# Patient Record
Sex: FEMALE | Race: BLACK OR AFRICAN AMERICAN | Employment: OTHER | URBAN - METROPOLITAN AREA
[De-identification: names, ages, dates, MRNs, and addresses within clinical notes are randomized per-mention and may not be internally consistent; named-entity substitution may affect disease eponyms.]

---

## 2017-02-13 ENCOUNTER — HOSPITAL ENCOUNTER (OUTPATIENT)
Dept: SURGERY | Age: 67
Discharge: HOME | End: 2017-02-13
Attending: INTERNAL MEDICINE | Admitting: INTERNAL MEDICINE

## 2017-02-13 VITALS
OXYGEN SATURATION: 98 % | RESPIRATION RATE: 16 BRPM | BODY MASS INDEX: 32.61 KG/M2 | HEIGHT: 64 IN | SYSTOLIC BLOOD PRESSURE: 135 MMHG | DIASTOLIC BLOOD PRESSURE: 78 MMHG | HEART RATE: 77 BPM | WEIGHT: 191 LBS | TEMPERATURE: 97.9 F

## 2017-02-13 RX ORDER — SODIUM CHLORIDE, SODIUM LACTATE, POTASSIUM CHLORIDE, CALCIUM CHLORIDE 600; 310; 30; 20 MG/100ML; MG/100ML; MG/100ML; MG/100ML
INJECTION, SOLUTION INTRAVENOUS CONTINUOUS
Status: DISCONTINUED | OUTPATIENT
Start: 2017-02-13 | End: 2017-02-14 | Stop reason: HOSPADM

## 2017-02-13 RX ORDER — SODIUM CHLORIDE 0.9 % (FLUSH) 0.9 %
10 SYRINGE (ML) INJECTION EVERY 12 HOURS SCHEDULED
Status: DISCONTINUED | OUTPATIENT
Start: 2017-02-13 | End: 2017-02-14 | Stop reason: HOSPADM

## 2017-02-13 RX ORDER — SODIUM CHLORIDE 0.9 % (FLUSH) 0.9 %
10 SYRINGE (ML) INJECTION PRN
Status: DISCONTINUED | OUTPATIENT
Start: 2017-02-13 | End: 2017-02-14 | Stop reason: HOSPADM

## 2017-02-13 ASSESSMENT — PAIN - FUNCTIONAL ASSESSMENT: PAIN_FUNCTIONAL_ASSESSMENT: 0-10

## 2017-03-06 ENCOUNTER — HOSPITAL ENCOUNTER (OUTPATIENT)
Dept: PHYSICAL THERAPY | Age: 67
Discharge: HOME | End: 2017-03-31
Attending: ORTHOPAEDIC SURGERY | Admitting: ORTHOPAEDIC SURGERY

## 2017-03-10 ENCOUNTER — OFFICE VISIT (OUTPATIENT)
Dept: FAMILY MEDICINE CLINIC | Age: 67
End: 2017-03-10

## 2017-03-10 VITALS
DIASTOLIC BLOOD PRESSURE: 80 MMHG | SYSTOLIC BLOOD PRESSURE: 120 MMHG | HEIGHT: 64 IN | WEIGHT: 193.9 LBS | OXYGEN SATURATION: 95 % | HEART RATE: 67 BPM | BODY MASS INDEX: 33.1 KG/M2

## 2017-03-10 DIAGNOSIS — L98.9 SKIN LESION OF LEFT LEG: ICD-10-CM

## 2017-03-10 PROCEDURE — 1123F ACP DISCUSS/DSCN MKR DOCD: CPT | Performed by: FAMILY MEDICINE

## 2017-03-10 PROCEDURE — G8417 CALC BMI ABV UP PARAM F/U: HCPCS | Performed by: FAMILY MEDICINE

## 2017-03-10 PROCEDURE — 3017F COLORECTAL CA SCREEN DOC REV: CPT | Performed by: FAMILY MEDICINE

## 2017-03-10 PROCEDURE — 1090F PRES/ABSN URINE INCON ASSESS: CPT | Performed by: FAMILY MEDICINE

## 2017-03-10 PROCEDURE — G8427 DOCREV CUR MEDS BY ELIG CLIN: HCPCS | Performed by: FAMILY MEDICINE

## 2017-03-10 PROCEDURE — G8484 FLU IMMUNIZE NO ADMIN: HCPCS | Performed by: FAMILY MEDICINE

## 2017-03-10 PROCEDURE — 4040F PNEUMOC VAC/ADMIN/RCVD: CPT | Performed by: FAMILY MEDICINE

## 2017-03-10 PROCEDURE — 99213 OFFICE O/P EST LOW 20 MIN: CPT | Performed by: FAMILY MEDICINE

## 2017-03-10 PROCEDURE — 3014F SCREEN MAMMO DOC REV: CPT | Performed by: FAMILY MEDICINE

## 2017-03-10 PROCEDURE — 4004F PT TOBACCO SCREEN RCVD TLK: CPT | Performed by: FAMILY MEDICINE

## 2017-03-10 PROCEDURE — G8399 PT W/DXA RESULTS DOCUMENT: HCPCS | Performed by: FAMILY MEDICINE

## 2017-03-10 ASSESSMENT — ENCOUNTER SYMPTOMS
NAIL CHANGES: 0
COUGH: 0
VOMITING: 0
EYE PAIN: 0
SORE THROAT: 0
DIARRHEA: 0
SHORTNESS OF BREATH: 0
RHINORRHEA: 0

## 2017-05-19 ENCOUNTER — OFFICE VISIT (OUTPATIENT)
Dept: FAMILY MEDICINE CLINIC | Age: 67
End: 2017-05-19

## 2017-05-19 VITALS
HEIGHT: 64 IN | SYSTOLIC BLOOD PRESSURE: 120 MMHG | BODY MASS INDEX: 33.53 KG/M2 | OXYGEN SATURATION: 98 % | WEIGHT: 196.4 LBS | HEART RATE: 85 BPM | DIASTOLIC BLOOD PRESSURE: 80 MMHG

## 2017-05-19 DIAGNOSIS — M75.21 BICEPS TENDINITIS OF RIGHT UPPER EXTREMITY: ICD-10-CM

## 2017-05-19 DIAGNOSIS — F17.200 SMOKER: ICD-10-CM

## 2017-05-19 PROCEDURE — 4004F PT TOBACCO SCREEN RCVD TLK: CPT | Performed by: FAMILY MEDICINE

## 2017-05-19 PROCEDURE — 1090F PRES/ABSN URINE INCON ASSESS: CPT | Performed by: FAMILY MEDICINE

## 2017-05-19 PROCEDURE — G8399 PT W/DXA RESULTS DOCUMENT: HCPCS | Performed by: FAMILY MEDICINE

## 2017-05-19 PROCEDURE — 3017F COLORECTAL CA SCREEN DOC REV: CPT | Performed by: FAMILY MEDICINE

## 2017-05-19 PROCEDURE — G8417 CALC BMI ABV UP PARAM F/U: HCPCS | Performed by: FAMILY MEDICINE

## 2017-05-19 PROCEDURE — 99213 OFFICE O/P EST LOW 20 MIN: CPT | Performed by: FAMILY MEDICINE

## 2017-05-19 PROCEDURE — 4040F PNEUMOC VAC/ADMIN/RCVD: CPT | Performed by: FAMILY MEDICINE

## 2017-05-19 PROCEDURE — 3014F SCREEN MAMMO DOC REV: CPT | Performed by: FAMILY MEDICINE

## 2017-05-19 PROCEDURE — G8427 DOCREV CUR MEDS BY ELIG CLIN: HCPCS | Performed by: FAMILY MEDICINE

## 2017-05-19 PROCEDURE — 1123F ACP DISCUSS/DSCN MKR DOCD: CPT | Performed by: FAMILY MEDICINE

## 2017-05-19 RX ORDER — ACETAMINOPHEN 325 MG/1
650 TABLET ORAL EVERY 6 HOURS PRN
COMMUNITY
End: 2021-04-15

## 2017-07-13 ENCOUNTER — CARE COORDINATION (OUTPATIENT)
Dept: CARE COORDINATION | Age: 67
End: 2017-07-13

## 2017-07-13 ENCOUNTER — OFFICE VISIT (OUTPATIENT)
Dept: FAMILY MEDICINE CLINIC | Age: 67
End: 2017-07-13

## 2017-07-13 VITALS
TEMPERATURE: 97.6 F | OXYGEN SATURATION: 99 % | DIASTOLIC BLOOD PRESSURE: 80 MMHG | BODY MASS INDEX: 33.12 KG/M2 | HEIGHT: 64 IN | WEIGHT: 194 LBS | SYSTOLIC BLOOD PRESSURE: 136 MMHG | HEART RATE: 79 BPM

## 2017-07-13 DIAGNOSIS — M54.32 SCIATICA, LEFT SIDE: ICD-10-CM

## 2017-07-13 PROCEDURE — 4004F PT TOBACCO SCREEN RCVD TLK: CPT | Performed by: NURSE PRACTITIONER

## 2017-07-13 PROCEDURE — G8427 DOCREV CUR MEDS BY ELIG CLIN: HCPCS | Performed by: NURSE PRACTITIONER

## 2017-07-13 PROCEDURE — 3014F SCREEN MAMMO DOC REV: CPT | Performed by: NURSE PRACTITIONER

## 2017-07-13 PROCEDURE — 99213 OFFICE O/P EST LOW 20 MIN: CPT | Performed by: NURSE PRACTITIONER

## 2017-07-13 PROCEDURE — 3017F COLORECTAL CA SCREEN DOC REV: CPT | Performed by: NURSE PRACTITIONER

## 2017-07-13 PROCEDURE — G8417 CALC BMI ABV UP PARAM F/U: HCPCS | Performed by: NURSE PRACTITIONER

## 2017-07-13 PROCEDURE — G8399 PT W/DXA RESULTS DOCUMENT: HCPCS | Performed by: NURSE PRACTITIONER

## 2017-07-13 PROCEDURE — 4040F PNEUMOC VAC/ADMIN/RCVD: CPT | Performed by: NURSE PRACTITIONER

## 2017-07-13 PROCEDURE — 1123F ACP DISCUSS/DSCN MKR DOCD: CPT | Performed by: NURSE PRACTITIONER

## 2017-07-13 PROCEDURE — 1090F PRES/ABSN URINE INCON ASSESS: CPT | Performed by: NURSE PRACTITIONER

## 2017-07-13 RX ORDER — BACLOFEN 10 MG/1
10 TABLET ORAL 2 TIMES DAILY
Qty: 60 TABLET | Refills: 3 | Status: SHIPPED | OUTPATIENT
Start: 2017-07-13 | End: 2017-12-18

## 2017-07-13 RX ORDER — OXYCODONE HYDROCHLORIDE AND ACETAMINOPHEN 5; 325 MG/1; MG/1
1-2 TABLET ORAL EVERY 4 HOURS PRN
Qty: 30 TABLET | Refills: 0 | Status: SHIPPED | OUTPATIENT
Start: 2017-07-13 | End: 2017-07-20

## 2017-07-13 ASSESSMENT — ENCOUNTER SYMPTOMS
WHEEZING: 0
ALLERGIC/IMMUNOLOGIC NEGATIVE: 1
STRIDOR: 0
VOICE CHANGE: 0
GASTROINTESTINAL NEGATIVE: 1
CHOKING: 0
SHORTNESS OF BREATH: 0
EYES NEGATIVE: 1
COLOR CHANGE: 0
NAUSEA: 0
ANAL BLEEDING: 0
BACK PAIN: 1
ABDOMINAL DISTENTION: 0
TROUBLE SWALLOWING: 0
DIARRHEA: 0
APNEA: 0
BOWEL INCONTINENCE: 0
CHEST TIGHTNESS: 0
CONSTIPATION: 0
ABDOMINAL PAIN: 0
BLOOD IN STOOL: 0
VOMITING: 0
COUGH: 0
FACIAL SWELLING: 0

## 2017-07-18 ENCOUNTER — OFFICE VISIT (OUTPATIENT)
Dept: FAMILY MEDICINE CLINIC | Age: 67
End: 2017-07-18

## 2017-07-18 VITALS
WEIGHT: 196.2 LBS | BODY MASS INDEX: 33.49 KG/M2 | OXYGEN SATURATION: 97 % | TEMPERATURE: 97.6 F | HEART RATE: 80 BPM | SYSTOLIC BLOOD PRESSURE: 136 MMHG | HEIGHT: 64 IN | DIASTOLIC BLOOD PRESSURE: 72 MMHG

## 2017-07-18 DIAGNOSIS — M54.32 SCIATICA, LEFT SIDE: ICD-10-CM

## 2017-07-18 PROCEDURE — 1090F PRES/ABSN URINE INCON ASSESS: CPT | Performed by: NURSE PRACTITIONER

## 2017-07-18 PROCEDURE — G8427 DOCREV CUR MEDS BY ELIG CLIN: HCPCS | Performed by: NURSE PRACTITIONER

## 2017-07-18 PROCEDURE — 4040F PNEUMOC VAC/ADMIN/RCVD: CPT | Performed by: NURSE PRACTITIONER

## 2017-07-18 PROCEDURE — 3017F COLORECTAL CA SCREEN DOC REV: CPT | Performed by: NURSE PRACTITIONER

## 2017-07-18 PROCEDURE — 99213 OFFICE O/P EST LOW 20 MIN: CPT | Performed by: NURSE PRACTITIONER

## 2017-07-18 PROCEDURE — G8399 PT W/DXA RESULTS DOCUMENT: HCPCS | Performed by: NURSE PRACTITIONER

## 2017-07-18 PROCEDURE — G8417 CALC BMI ABV UP PARAM F/U: HCPCS | Performed by: NURSE PRACTITIONER

## 2017-07-18 PROCEDURE — 3014F SCREEN MAMMO DOC REV: CPT | Performed by: NURSE PRACTITIONER

## 2017-07-18 PROCEDURE — 1123F ACP DISCUSS/DSCN MKR DOCD: CPT | Performed by: NURSE PRACTITIONER

## 2017-07-18 PROCEDURE — 4004F PT TOBACCO SCREEN RCVD TLK: CPT | Performed by: NURSE PRACTITIONER

## 2017-07-18 RX ORDER — TIZANIDINE 4 MG/1
2 TABLET ORAL 3 TIMES DAILY
Qty: 60 TABLET | Refills: 2 | Status: SHIPPED | OUTPATIENT
Start: 2017-07-18 | End: 2017-12-18 | Stop reason: SDUPTHER

## 2017-07-18 ASSESSMENT — ENCOUNTER SYMPTOMS
WHEEZING: 0
ANAL BLEEDING: 0
BOWEL INCONTINENCE: 0
ABDOMINAL DISTENTION: 0
CONSTIPATION: 0
SHORTNESS OF BREATH: 0
CHOKING: 0
COUGH: 0
STRIDOR: 0
FACIAL SWELLING: 0
APNEA: 0
ABDOMINAL PAIN: 0
GASTROINTESTINAL NEGATIVE: 1
NAUSEA: 0
CHEST TIGHTNESS: 0
BACK PAIN: 1
COLOR CHANGE: 0
ALLERGIC/IMMUNOLOGIC NEGATIVE: 1
VOMITING: 0
EYES NEGATIVE: 1
BLOOD IN STOOL: 0
DIARRHEA: 0
TROUBLE SWALLOWING: 0
VOICE CHANGE: 0

## 2017-07-19 ENCOUNTER — HOSPITAL ENCOUNTER (OUTPATIENT)
Dept: CT IMAGING | Age: 67
Discharge: HOME | End: 2017-07-19
Attending: NURSE PRACTITIONER | Admitting: NURSE PRACTITIONER

## 2017-07-19 DIAGNOSIS — M54.32 SCIATICA OF LEFT SIDE: ICD-10-CM

## 2017-07-19 DIAGNOSIS — M54.32 SCIATICA, LEFT SIDE: ICD-10-CM

## 2017-07-20 ENCOUNTER — TELEPHONE (OUTPATIENT)
Dept: FAMILY MEDICINE CLINIC | Age: 67
End: 2017-07-20

## 2017-07-21 DIAGNOSIS — M54.16 LUMBAR RADICULOPATHY: Primary | ICD-10-CM

## 2017-07-31 ENCOUNTER — HOSPITAL ENCOUNTER (OUTPATIENT)
Dept: CARDIAC CATH/INVASIVE PROCEDURES | Age: 67
Discharge: HOME | End: 2017-07-31
Attending: FAMILY MEDICINE | Admitting: FAMILY MEDICINE

## 2017-07-31 DIAGNOSIS — M54.16 LUMBAR RADICULOPATHY: ICD-10-CM

## 2017-11-30 ENCOUNTER — OFFICE VISIT (OUTPATIENT)
Dept: FAMILY MEDICINE CLINIC | Age: 67
End: 2017-11-30

## 2017-11-30 VITALS
SYSTOLIC BLOOD PRESSURE: 130 MMHG | HEART RATE: 88 BPM | HEIGHT: 64 IN | BODY MASS INDEX: 33.84 KG/M2 | WEIGHT: 198.2 LBS | DIASTOLIC BLOOD PRESSURE: 80 MMHG | OXYGEN SATURATION: 98 %

## 2017-11-30 DIAGNOSIS — M79.89 LEG SWELLING: ICD-10-CM

## 2017-11-30 PROCEDURE — G8399 PT W/DXA RESULTS DOCUMENT: HCPCS | Performed by: FAMILY MEDICINE

## 2017-11-30 PROCEDURE — G8417 CALC BMI ABV UP PARAM F/U: HCPCS | Performed by: FAMILY MEDICINE

## 2017-11-30 PROCEDURE — G8484 FLU IMMUNIZE NO ADMIN: HCPCS | Performed by: FAMILY MEDICINE

## 2017-11-30 PROCEDURE — 4040F PNEUMOC VAC/ADMIN/RCVD: CPT | Performed by: FAMILY MEDICINE

## 2017-11-30 PROCEDURE — 3014F SCREEN MAMMO DOC REV: CPT | Performed by: FAMILY MEDICINE

## 2017-11-30 PROCEDURE — 1090F PRES/ABSN URINE INCON ASSESS: CPT | Performed by: FAMILY MEDICINE

## 2017-11-30 PROCEDURE — 99213 OFFICE O/P EST LOW 20 MIN: CPT | Performed by: FAMILY MEDICINE

## 2017-11-30 PROCEDURE — 1123F ACP DISCUSS/DSCN MKR DOCD: CPT | Performed by: FAMILY MEDICINE

## 2017-11-30 PROCEDURE — 4004F PT TOBACCO SCREEN RCVD TLK: CPT | Performed by: FAMILY MEDICINE

## 2017-11-30 PROCEDURE — 3017F COLORECTAL CA SCREEN DOC REV: CPT | Performed by: FAMILY MEDICINE

## 2017-11-30 PROCEDURE — G8427 DOCREV CUR MEDS BY ELIG CLIN: HCPCS | Performed by: FAMILY MEDICINE

## 2017-11-30 ASSESSMENT — PATIENT HEALTH QUESTIONNAIRE - PHQ9
1. LITTLE INTEREST OR PLEASURE IN DOING THINGS: 0
2. FEELING DOWN, DEPRESSED OR HOPELESS: 0
SUM OF ALL RESPONSES TO PHQ9 QUESTIONS 1 & 2: 0
SUM OF ALL RESPONSES TO PHQ QUESTIONS 1-9: 0

## 2017-11-30 NOTE — PROGRESS NOTES
thought content normal.   Vitals reviewed. Assessment:      Leg swelling  ?  Superficial phlebitis--get dopplers          Plan:      above

## 2017-12-01 ENCOUNTER — HOSPITAL ENCOUNTER (OUTPATIENT)
Dept: VASCULAR LAB | Age: 67
Discharge: HOME | End: 2017-12-01
Attending: FAMILY MEDICINE | Admitting: FAMILY MEDICINE

## 2017-12-01 DIAGNOSIS — M79.89 LEG SWELLING: Primary | ICD-10-CM

## 2017-12-01 DIAGNOSIS — M79.89 OTHER SPECIFIED SOFT TISSUE DISORDERS (CODE): ICD-10-CM

## 2017-12-18 ENCOUNTER — OFFICE VISIT (OUTPATIENT)
Dept: FAMILY MEDICINE CLINIC | Age: 67
End: 2017-12-18

## 2017-12-18 VITALS
WEIGHT: 196.7 LBS | HEIGHT: 64 IN | OXYGEN SATURATION: 96 % | HEART RATE: 89 BPM | DIASTOLIC BLOOD PRESSURE: 80 MMHG | BODY MASS INDEX: 33.58 KG/M2 | SYSTOLIC BLOOD PRESSURE: 120 MMHG

## 2017-12-18 DIAGNOSIS — M54.16 LUMBAR RADICULOPATHY: ICD-10-CM

## 2017-12-18 PROCEDURE — 3017F COLORECTAL CA SCREEN DOC REV: CPT | Performed by: FAMILY MEDICINE

## 2017-12-18 PROCEDURE — G8484 FLU IMMUNIZE NO ADMIN: HCPCS | Performed by: FAMILY MEDICINE

## 2017-12-18 PROCEDURE — 1123F ACP DISCUSS/DSCN MKR DOCD: CPT | Performed by: FAMILY MEDICINE

## 2017-12-18 PROCEDURE — 3014F SCREEN MAMMO DOC REV: CPT | Performed by: FAMILY MEDICINE

## 2017-12-18 PROCEDURE — 4040F PNEUMOC VAC/ADMIN/RCVD: CPT | Performed by: FAMILY MEDICINE

## 2017-12-18 PROCEDURE — 99213 OFFICE O/P EST LOW 20 MIN: CPT | Performed by: FAMILY MEDICINE

## 2017-12-18 PROCEDURE — 4004F PT TOBACCO SCREEN RCVD TLK: CPT | Performed by: FAMILY MEDICINE

## 2017-12-18 PROCEDURE — G8417 CALC BMI ABV UP PARAM F/U: HCPCS | Performed by: FAMILY MEDICINE

## 2017-12-18 PROCEDURE — G8399 PT W/DXA RESULTS DOCUMENT: HCPCS | Performed by: FAMILY MEDICINE

## 2017-12-18 PROCEDURE — G8427 DOCREV CUR MEDS BY ELIG CLIN: HCPCS | Performed by: FAMILY MEDICINE

## 2017-12-18 PROCEDURE — 1090F PRES/ABSN URINE INCON ASSESS: CPT | Performed by: FAMILY MEDICINE

## 2017-12-18 RX ORDER — MELOXICAM 15 MG/1
15 TABLET ORAL DAILY
Qty: 30 TABLET | Refills: 3 | Status: SHIPPED | OUTPATIENT
Start: 2017-12-18 | End: 2018-10-25

## 2017-12-18 RX ORDER — TIZANIDINE 4 MG/1
4 TABLET ORAL 3 TIMES DAILY
Qty: 60 TABLET | Refills: 2 | Status: SHIPPED | OUTPATIENT
Start: 2017-12-18 | End: 2019-06-11

## 2017-12-18 RX ORDER — MELOXICAM 15 MG/1
15 TABLET ORAL DAILY
Qty: 30 TABLET | Refills: 3 | Status: SHIPPED | OUTPATIENT
Start: 2017-12-18 | End: 2017-12-18 | Stop reason: SDUPTHER

## 2017-12-18 ASSESSMENT — ENCOUNTER SYMPTOMS
ABDOMINAL PAIN: 0
BACK PAIN: 1
BOWEL INCONTINENCE: 0

## 2017-12-18 NOTE — PROGRESS NOTES
Subjective:      Patient ID: Josseline De Anda is a 79 y.o. female. Back Pain   This is a chronic problem. The current episode started more than 1 year ago. The problem occurs constantly. The problem has been waxing and waning since onset. The pain is present in the lumbar spine. The quality of the pain is described as aching. The pain is moderate. The pain is worse during the day. The symptoms are aggravated by bending and position. Stiffness is present all day. Associated symptoms include weakness. Pertinent negatives include no abdominal pain, bladder incontinence, bowel incontinence, chest pain, dysuria, fever, headaches, leg pain, numbness, paresis, paresthesias, pelvic pain, perianal numbness, tingling or weight loss. Risk factors include sedentary lifestyle. She has tried muscle relaxant for the symptoms. The treatment provided mild relief. Knee Pain    The incident occurred more than 1 week ago. The incident occurred at home. There was no injury mechanism. The pain is present in the left leg. The pain is moderate. The pain has been constant since onset. Pertinent negatives include no numbness or tingling. Nothing aggravates the symptoms. She has tried non-weight bearing and ice for the symptoms. The treatment provided moderate relief. Review of Systems   Constitutional: Negative for fever and weight loss. Cardiovascular: Negative for chest pain. Gastrointestinal: Negative for abdominal pain and bowel incontinence. Genitourinary: Negative for bladder incontinence, dysuria and pelvic pain. Musculoskeletal: Positive for back pain. Neurological: Positive for weakness. Negative for tingling, numbness, headaches and paresthesias. Objective:   Physical Exam   Constitutional: She is oriented to person, place, and time. She appears well-developed and well-nourished. No distress.    HENT:   Mouth/Throat: Oropharynx is clear and moist.   Eyes: Conjunctivae are normal. Pupils are equal, round, and reactive to light. Neck: No JVD present. No tracheal deviation present. No thyromegaly present. Cardiovascular: Normal rate, regular rhythm, normal heart sounds and intact distal pulses. Exam reveals no gallop. No murmur heard. Pulmonary/Chest: Effort normal and breath sounds normal. No stridor. No respiratory distress. She has no wheezes. She has no rales. She exhibits no tenderness. Abdominal: Soft. Bowel sounds are normal. She exhibits no distension and no mass. There is no tenderness. Musculoskeletal: She exhibits no edema or tenderness. Lymphadenopathy:     She has no cervical adenopathy. Neurological: She is alert and oriented to person, place, and time. She displays normal reflexes. No cranial nerve deficit. She exhibits normal muscle tone. Coordination normal.   Skin: Skin is warm and dry. No rash noted. No erythema. No pallor. Psychiatric: She has a normal mood and affect. Her behavior is normal. Judgment and thought content normal.   Vitals reviewed.       Assessment:      Lumbar radiculopathy  Trial of mobic--JOE didn't help          Plan:      above

## 2018-02-20 ENCOUNTER — HOSPITAL ENCOUNTER (OUTPATIENT)
Dept: MAMMOGRAPHY | Age: 68
Discharge: HOME | End: 2018-02-20
Attending: FAMILY MEDICINE | Admitting: FAMILY MEDICINE

## 2018-02-20 DIAGNOSIS — Z12.31 VISIT FOR SCREENING MAMMOGRAM: ICD-10-CM

## 2018-02-26 ENCOUNTER — TELEPHONE (OUTPATIENT)
Dept: FAMILY MEDICINE CLINIC | Age: 68
End: 2018-02-26

## 2018-02-26 DIAGNOSIS — M22.42 CHONDROMALACIA PATELLAE OF LEFT KNEE: ICD-10-CM

## 2018-02-26 DIAGNOSIS — M54.16 LUMBAR RADICULOPATHY: Primary | ICD-10-CM

## 2018-02-26 DIAGNOSIS — M17.12 OSTEOARTHRITIS OF LEFT KNEE, UNSPECIFIED OSTEOARTHRITIS TYPE: ICD-10-CM

## 2018-02-26 DIAGNOSIS — M48.061 SPINAL STENOSIS OF LUMBAR REGION, UNSPECIFIED WHETHER NEUROGENIC CLAUDICATION PRESENT: ICD-10-CM

## 2018-02-26 DIAGNOSIS — M17.0 OSTEOARTHRITIS OF BOTH KNEES, UNSPECIFIED OSTEOARTHRITIS TYPE: ICD-10-CM

## 2018-02-26 NOTE — TELEPHONE ENCOUNTER
Patient is calling requesting a written RX to be faxed in for a lift chair due to her bad knees and bad lower back. If patient gets a RX for this she will save 300+ dollars.  Please advise and  Fax to Je[facility] Ward [phone]

## 2018-03-07 ENCOUNTER — OFFICE VISIT (OUTPATIENT)
Dept: FAMILY MEDICINE CLINIC | Age: 68
End: 2018-03-07

## 2018-03-07 VITALS
TEMPERATURE: 98.9 F | HEART RATE: 98 BPM | OXYGEN SATURATION: 96 % | HEIGHT: 64 IN | BODY MASS INDEX: 34.35 KG/M2 | WEIGHT: 201.2 LBS | DIASTOLIC BLOOD PRESSURE: 80 MMHG | SYSTOLIC BLOOD PRESSURE: 120 MMHG

## 2018-03-07 DIAGNOSIS — K21.9 GASTROESOPHAGEAL REFLUX DISEASE WITHOUT ESOPHAGITIS: ICD-10-CM

## 2018-03-07 DIAGNOSIS — Z00.00 ANNUAL PHYSICAL EXAM: ICD-10-CM

## 2018-03-07 DIAGNOSIS — R50.9 FEVER, UNSPECIFIED FEVER CAUSE: Primary | ICD-10-CM

## 2018-03-07 DIAGNOSIS — F17.200 SMOKER: ICD-10-CM

## 2018-03-07 DIAGNOSIS — J10.1 INFLUENZA A: ICD-10-CM

## 2018-03-07 PROBLEM — M75.21 BICEPS TENDINITIS OF RIGHT UPPER EXTREMITY: Status: RESOLVED | Noted: 2017-05-19 | Resolved: 2018-03-07

## 2018-03-07 LAB
INFLUENZA A ANTIBODY: ABNORMAL
INFLUENZA B ANTIBODY: ABNORMAL

## 2018-03-07 PROCEDURE — G0439 PPPS, SUBSEQ VISIT: HCPCS | Performed by: FAMILY MEDICINE

## 2018-03-07 PROCEDURE — 87804 INFLUENZA ASSAY W/OPTIC: CPT | Performed by: FAMILY MEDICINE

## 2018-03-07 RX ORDER — OSELTAMIVIR PHOSPHATE 75 MG/1
75 CAPSULE ORAL 2 TIMES DAILY
Qty: 10 CAPSULE | Refills: 0 | Status: SHIPPED | OUTPATIENT
Start: 2018-03-07 | End: 2018-03-12

## 2018-03-19 ENCOUNTER — OFFICE VISIT (OUTPATIENT)
Dept: FAMILY MEDICINE CLINIC | Age: 68
End: 2018-03-19

## 2018-03-19 VITALS
WEIGHT: 194.4 LBS | BODY MASS INDEX: 33.19 KG/M2 | HEART RATE: 92 BPM | SYSTOLIC BLOOD PRESSURE: 140 MMHG | OXYGEN SATURATION: 96 % | DIASTOLIC BLOOD PRESSURE: 86 MMHG | HEIGHT: 64 IN

## 2018-03-19 DIAGNOSIS — J45.20 MILD INTERMITTENT ASTHMATIC BRONCHITIS WITHOUT COMPLICATION: Primary | ICD-10-CM

## 2018-03-19 DIAGNOSIS — Z00.00 ANNUAL PHYSICAL EXAM: ICD-10-CM

## 2018-03-19 LAB
A/G RATIO: 1.3 (ref 1.1–2.2)
ALBUMIN SERPL-MCNC: 4.4 G/DL (ref 3.4–5)
ALP BLD-CCNC: 57 U/L (ref 40–129)
ALT SERPL-CCNC: 7 U/L (ref 10–40)
ANION GAP SERPL CALCULATED.3IONS-SCNC: 18 MMOL/L (ref 3–16)
AST SERPL-CCNC: 15 U/L (ref 15–37)
BASOPHILS ABSOLUTE: 0 K/UL (ref 0–0.2)
BASOPHILS RELATIVE PERCENT: 0.7 %
BILIRUB SERPL-MCNC: <0.2 MG/DL (ref 0–1)
BUN BLDV-MCNC: 16 MG/DL (ref 7–20)
CALCIUM SERPL-MCNC: 9.3 MG/DL (ref 8.3–10.6)
CHLORIDE BLD-SCNC: 105 MMOL/L (ref 99–110)
CHOLESTEROL, TOTAL: 187 MG/DL (ref 0–199)
CO2: 24 MMOL/L (ref 21–32)
CREAT SERPL-MCNC: 0.9 MG/DL (ref 0.6–1.2)
EOSINOPHILS ABSOLUTE: 0.1 K/UL (ref 0–0.6)
EOSINOPHILS RELATIVE PERCENT: 1.6 %
GFR AFRICAN AMERICAN: >60
GFR NON-AFRICAN AMERICAN: >60
GLOBULIN: 3.3 G/DL
GLUCOSE BLD-MCNC: 105 MG/DL (ref 70–99)
HCT VFR BLD CALC: 44.5 % (ref 36–48)
HDLC SERPL-MCNC: 44 MG/DL (ref 40–60)
HEMOGLOBIN: 14.9 G/DL (ref 12–16)
LDL CHOLESTEROL CALCULATED: 123 MG/DL
LYMPHOCYTES ABSOLUTE: 2.4 K/UL (ref 1–5.1)
LYMPHOCYTES RELATIVE PERCENT: 48.8 %
MCH RBC QN AUTO: 29.6 PG (ref 26–34)
MCHC RBC AUTO-ENTMCNC: 33.6 G/DL (ref 31–36)
MCV RBC AUTO: 88.1 FL (ref 80–100)
MONOCYTES ABSOLUTE: 0.6 K/UL (ref 0–1.3)
MONOCYTES RELATIVE PERCENT: 11.1 %
NEUTROPHILS ABSOLUTE: 1.9 K/UL (ref 1.7–7.7)
NEUTROPHILS RELATIVE PERCENT: 37.8 %
PDW BLD-RTO: 15.2 % (ref 12.4–15.4)
PLATELET # BLD: 282 K/UL (ref 135–450)
PMV BLD AUTO: 9 FL (ref 5–10.5)
POTASSIUM SERPL-SCNC: 4.8 MMOL/L (ref 3.5–5.1)
RBC # BLD: 5.05 M/UL (ref 4–5.2)
SODIUM BLD-SCNC: 147 MMOL/L (ref 136–145)
TOTAL PROTEIN: 7.7 G/DL (ref 6.4–8.2)
TRIGL SERPL-MCNC: 98 MG/DL (ref 0–150)
VLDLC SERPL CALC-MCNC: 20 MG/DL
WBC # BLD: 5 K/UL (ref 4–11)

## 2018-03-19 PROCEDURE — 3017F COLORECTAL CA SCREEN DOC REV: CPT | Performed by: FAMILY MEDICINE

## 2018-03-19 PROCEDURE — 4004F PT TOBACCO SCREEN RCVD TLK: CPT | Performed by: FAMILY MEDICINE

## 2018-03-19 PROCEDURE — G8417 CALC BMI ABV UP PARAM F/U: HCPCS | Performed by: FAMILY MEDICINE

## 2018-03-19 PROCEDURE — 3014F SCREEN MAMMO DOC REV: CPT | Performed by: FAMILY MEDICINE

## 2018-03-19 PROCEDURE — 99213 OFFICE O/P EST LOW 20 MIN: CPT | Performed by: FAMILY MEDICINE

## 2018-03-19 PROCEDURE — 1123F ACP DISCUSS/DSCN MKR DOCD: CPT | Performed by: FAMILY MEDICINE

## 2018-03-19 PROCEDURE — G8484 FLU IMMUNIZE NO ADMIN: HCPCS | Performed by: FAMILY MEDICINE

## 2018-03-19 PROCEDURE — G8399 PT W/DXA RESULTS DOCUMENT: HCPCS | Performed by: FAMILY MEDICINE

## 2018-03-19 PROCEDURE — G8427 DOCREV CUR MEDS BY ELIG CLIN: HCPCS | Performed by: FAMILY MEDICINE

## 2018-03-19 PROCEDURE — 1090F PRES/ABSN URINE INCON ASSESS: CPT | Performed by: FAMILY MEDICINE

## 2018-03-19 PROCEDURE — 4040F PNEUMOC VAC/ADMIN/RCVD: CPT | Performed by: FAMILY MEDICINE

## 2018-03-19 RX ORDER — ALBUTEROL SULFATE 90 UG/1
2 AEROSOL, METERED RESPIRATORY (INHALATION) EVERY 6 HOURS PRN
Qty: 1 INHALER | Refills: 3 | Status: SHIPPED | OUTPATIENT
Start: 2018-03-19 | End: 2019-06-11

## 2018-03-19 RX ORDER — PREDNISONE 20 MG/1
20 TABLET ORAL DAILY
Qty: 10 TABLET | Refills: 0 | Status: SHIPPED | OUTPATIENT
Start: 2018-03-19 | End: 2018-03-29

## 2018-03-19 ASSESSMENT — ENCOUNTER SYMPTOMS
WHEEZING: 0
RHINORRHEA: 1
HEMOPTYSIS: 0
HEARTBURN: 0
COUGH: 1
SORE THROAT: 0
SHORTNESS OF BREATH: 1

## 2018-03-19 NOTE — PROGRESS NOTES
Subjective:      Patient ID: Sai Longoria is a 79 y.o. female. Cough   This is a new problem. The current episode started in the past 7 days. The problem has been unchanged. The problem occurs constantly. The cough is non-productive. Associated symptoms include nasal congestion, postnasal drip, rhinorrhea and shortness of breath. Pertinent negatives include no chest pain, chills, ear congestion, ear pain, fever, headaches, heartburn, hemoptysis, myalgias, rash, sore throat, sweats, weight loss or wheezing. Nothing aggravates the symptoms. She has tried nothing for the symptoms. The treatment provided moderate relief. There is no history of asthma, bronchiectasis, bronchitis, COPD, emphysema, environmental allergies or pneumonia. is allergic to glucosamine chondroitin joint [glucos-chondroit-hyaluron-msm] and relafen [nabumetone]. Current Outpatient Prescriptions:     tiZANidine (ZANAFLEX) 4 MG tablet, Take 1 tablet by mouth 3 times daily, Disp: 60 tablet, Rfl: 2    meloxicam (MOBIC) 15 MG tablet, Take 1 tablet by mouth daily, Disp: 30 tablet, Rfl: 3    Lift Chair MISC, by Does not apply route, Disp: 1 each, Rfl: 0    acetaminophen (TYLENOL) 325 MG tablet, Take 650 mg by mouth every 6 hours as needed for Pain, Disp: , Rfl:     vitamin B-12 (CYANOCOBALAMIN) 1000 MCG tablet, Take 1,000 mcg by mouth daily, Disp: , Rfl:     Calcium-Magnesium-Vitamin D (CALCIUM 500 PO), Take by mouth, Disp: , Rfl:     vitamin D-3 (CHOLECALCIFEROL) 5000 UNITS TABS, Take 5,000 Units by mouth daily. , Disp: , Rfl:      has a past medical history of Arthritis; Bile induced gastritis; Chest pain; Chronic back pain; Colorectal polyps; Diverticulosis; GERD (gastroesophageal reflux disease); Helicobacter pylori gastritis; Postmenopausal bleeding; Pregnancy; and Spinal stenosis.     Past Surgical History:   Procedure Laterality Date    CARPAL TUNNEL RELEASE  2006    CHOLECYSTECTOMY  1996    COLONOSCOPY  [id number]    U0N  MENISCECTOMY  2008    L knee    TUBAL LIGATION  1985        reports that she has been smoking Cigars. She has never used smokeless tobacco. She reports that she drinks about 0.6 oz of alcohol per week . family history includes Cancer in an other family member; Diabetes in her father, mother, sister, and sister; Hypertension in her father, mother, and sister; Lazarus Fujisawa in her sister; Other (age of onset: 79) in her brother; Thyroid Disease in her sister. Immunization History   Administered Date(s) Administered    Influenza Virus Vaccine 09/28/2011, 10/08/2013, 10/16/2014    Influenza, High Dose 09/17/2015    Pneumococcal 13-valent Conjugate ([id number]) 09/17/2015    Pneumococcal Polysaccharide ([id number]) 09/28/2011    Tdap (Boostrix, Adacel) 03/12/2013    Tetanus 08/07/2008    Zoster Live (Zostavax) 10/02/2012     Review of Systems   Constitutional: Negative for chills, fever and weight loss. HENT: Positive for postnasal drip and rhinorrhea. Negative for ear pain and sore throat. Respiratory: Positive for cough and shortness of breath. Negative for hemoptysis and wheezing. Cardiovascular: Negative for chest pain. Gastrointestinal: Negative for heartburn. Musculoskeletal: Negative for myalgias. Skin: Negative for rash. Allergic/Immunologic: Negative for environmental allergies. Neurological: Negative for headaches. Objective:   Physical Exam   Constitutional: She is oriented to person, place, and time. She appears well-developed and well-nourished. No distress. HENT:   Mouth/Throat: Oropharynx is clear and moist.   Eyes: Conjunctivae are normal. Pupils are equal, round, and reactive to light. Neck: No JVD present. No tracheal deviation present. No thyromegaly present. Cardiovascular: Normal rate, regular rhythm, normal heart sounds and intact distal pulses. Exam reveals no gallop. No murmur heard. Pulmonary/Chest: Effort normal. No stridor.  No respiratory

## 2018-04-02 ENCOUNTER — OFFICE VISIT (OUTPATIENT)
Dept: FAMILY MEDICINE CLINIC | Age: 68
End: 2018-04-02

## 2018-04-02 VITALS
SYSTOLIC BLOOD PRESSURE: 130 MMHG | OXYGEN SATURATION: 95 % | HEIGHT: 64 IN | DIASTOLIC BLOOD PRESSURE: 80 MMHG | WEIGHT: 198.9 LBS | HEART RATE: 90 BPM | BODY MASS INDEX: 33.96 KG/M2

## 2018-04-02 DIAGNOSIS — Z23 NEED FOR PNEUMOCOCCAL VACCINE: Primary | ICD-10-CM

## 2018-04-02 DIAGNOSIS — J45.20 MILD INTERMITTENT ASTHMATIC BRONCHITIS WITHOUT COMPLICATION: ICD-10-CM

## 2018-04-02 DIAGNOSIS — Z11.59 NEED FOR HEPATITIS C SCREENING TEST: ICD-10-CM

## 2018-04-02 DIAGNOSIS — R73.9 HYPERGLYCEMIA: ICD-10-CM

## 2018-04-02 LAB — HEPATITIS C ANTIBODY INTERPRETATION: NORMAL

## 2018-04-02 PROCEDURE — 90732 PPSV23 VACC 2 YRS+ SUBQ/IM: CPT | Performed by: FAMILY MEDICINE

## 2018-04-02 PROCEDURE — 1090F PRES/ABSN URINE INCON ASSESS: CPT | Performed by: FAMILY MEDICINE

## 2018-04-02 PROCEDURE — 4004F PT TOBACCO SCREEN RCVD TLK: CPT | Performed by: FAMILY MEDICINE

## 2018-04-02 PROCEDURE — G0009 ADMIN PNEUMOCOCCAL VACCINE: HCPCS | Performed by: FAMILY MEDICINE

## 2018-04-02 PROCEDURE — 3017F COLORECTAL CA SCREEN DOC REV: CPT | Performed by: FAMILY MEDICINE

## 2018-04-02 PROCEDURE — 4040F PNEUMOC VAC/ADMIN/RCVD: CPT | Performed by: FAMILY MEDICINE

## 2018-04-02 PROCEDURE — G8427 DOCREV CUR MEDS BY ELIG CLIN: HCPCS | Performed by: FAMILY MEDICINE

## 2018-04-02 PROCEDURE — G8417 CALC BMI ABV UP PARAM F/U: HCPCS | Performed by: FAMILY MEDICINE

## 2018-04-02 PROCEDURE — 1123F ACP DISCUSS/DSCN MKR DOCD: CPT | Performed by: FAMILY MEDICINE

## 2018-04-02 PROCEDURE — 3014F SCREEN MAMMO DOC REV: CPT | Performed by: FAMILY MEDICINE

## 2018-04-02 PROCEDURE — 99213 OFFICE O/P EST LOW 20 MIN: CPT | Performed by: FAMILY MEDICINE

## 2018-04-02 PROCEDURE — G8399 PT W/DXA RESULTS DOCUMENT: HCPCS | Performed by: FAMILY MEDICINE

## 2018-04-03 LAB
ESTIMATED AVERAGE GLUCOSE: 145.6 MG/DL
HBA1C MFR BLD: 6.7 %

## 2018-04-03 ASSESSMENT — ENCOUNTER SYMPTOMS
NAUSEA: 0
SORE THROAT: 0
VOMITING: 0
COUGH: 0
ABDOMINAL PAIN: 0
SINUS PAIN: 0
DIARRHEA: 0
RHINORRHEA: 0
SWOLLEN GLANDS: 0
WHEEZING: 1

## 2018-04-11 PROBLEM — J10.1 INFLUENZA A: Status: RESOLVED | Noted: 2018-03-07 | Resolved: 2018-04-11

## 2018-04-17 ENCOUNTER — TELEPHONE (OUTPATIENT)
Dept: FAMILY MEDICINE CLINIC | Age: 68
End: 2018-04-17

## 2018-04-25 ENCOUNTER — OFFICE VISIT (OUTPATIENT)
Dept: FAMILY MEDICINE CLINIC | Age: 68
End: 2018-04-25

## 2018-04-25 VITALS
BODY MASS INDEX: 33.73 KG/M2 | WEIGHT: 197.6 LBS | SYSTOLIC BLOOD PRESSURE: 136 MMHG | HEART RATE: 93 BPM | HEIGHT: 64 IN | OXYGEN SATURATION: 98 % | DIASTOLIC BLOOD PRESSURE: 80 MMHG

## 2018-04-25 DIAGNOSIS — E11.69 DIABETES MELLITUS TYPE 2 IN OBESE (HCC): ICD-10-CM

## 2018-04-25 DIAGNOSIS — E66.9 DIABETES MELLITUS TYPE 2 IN OBESE (HCC): ICD-10-CM

## 2018-04-25 PROCEDURE — 1123F ACP DISCUSS/DSCN MKR DOCD: CPT | Performed by: FAMILY MEDICINE

## 2018-04-25 PROCEDURE — 4040F PNEUMOC VAC/ADMIN/RCVD: CPT | Performed by: FAMILY MEDICINE

## 2018-04-25 PROCEDURE — 3017F COLORECTAL CA SCREEN DOC REV: CPT | Performed by: FAMILY MEDICINE

## 2018-04-25 PROCEDURE — 2022F DILAT RTA XM EVC RTNOPTHY: CPT | Performed by: FAMILY MEDICINE

## 2018-04-25 PROCEDURE — G8417 CALC BMI ABV UP PARAM F/U: HCPCS | Performed by: FAMILY MEDICINE

## 2018-04-25 PROCEDURE — G8427 DOCREV CUR MEDS BY ELIG CLIN: HCPCS | Performed by: FAMILY MEDICINE

## 2018-04-25 PROCEDURE — 4004F PT TOBACCO SCREEN RCVD TLK: CPT | Performed by: FAMILY MEDICINE

## 2018-04-25 PROCEDURE — 3044F HG A1C LEVEL LT 7.0%: CPT | Performed by: FAMILY MEDICINE

## 2018-04-25 PROCEDURE — 1090F PRES/ABSN URINE INCON ASSESS: CPT | Performed by: FAMILY MEDICINE

## 2018-04-25 PROCEDURE — 99213 OFFICE O/P EST LOW 20 MIN: CPT | Performed by: FAMILY MEDICINE

## 2018-04-25 PROCEDURE — G8399 PT W/DXA RESULTS DOCUMENT: HCPCS | Performed by: FAMILY MEDICINE

## 2018-04-25 RX ORDER — METFORMIN HYDROCHLORIDE 500 MG/1
500 TABLET, EXTENDED RELEASE ORAL
Qty: 30 TABLET | Refills: 3 | Status: SHIPPED | OUTPATIENT
Start: 2018-04-25 | End: 2018-10-25

## 2018-04-25 ASSESSMENT — ENCOUNTER SYMPTOMS
VOMITING: 0
NAUSEA: 0
ABDOMINAL PAIN: 0
SWOLLEN GLANDS: 0
CHANGE IN BOWEL HABIT: 0
SORE THROAT: 0
COUGH: 0
VISUAL CHANGE: 0

## 2018-05-01 ENCOUNTER — TELEPHONE (OUTPATIENT)
Dept: FAMILY MEDICINE CLINIC | Age: 68
End: 2018-05-01

## 2018-05-01 RX ORDER — BLOOD-GLUCOSE METER
1 KIT MISCELLANEOUS 2 TIMES DAILY
Qty: 1 KIT | Refills: 0 | Status: SHIPPED | OUTPATIENT
Start: 2018-05-01 | End: 2018-05-04 | Stop reason: SDUPTHER

## 2018-05-01 RX ORDER — LANCETS 28 GAUGE
1 EACH MISCELLANEOUS DAILY
Qty: 100 EACH | Refills: 3 | Status: SHIPPED | OUTPATIENT
Start: 2018-05-01 | End: 2018-05-04 | Stop reason: SDUPTHER

## 2018-05-03 ENCOUNTER — TELEPHONE (OUTPATIENT)
Dept: FAMILY MEDICINE CLINIC | Age: 68
End: 2018-05-03

## 2018-05-04 RX ORDER — LANCETS 28 GAUGE
1 EACH MISCELLANEOUS DAILY
Qty: 100 EACH | Refills: 3 | Status: SHIPPED | OUTPATIENT
Start: 2018-05-04 | End: 2018-05-04 | Stop reason: SDUPTHER

## 2018-05-04 RX ORDER — BLOOD-GLUCOSE METER
1 KIT MISCELLANEOUS 2 TIMES DAILY
Qty: 1 KIT | Refills: 0 | Status: SHIPPED | OUTPATIENT
Start: 2018-05-04 | End: 2018-05-10 | Stop reason: SDUPTHER

## 2018-05-04 RX ORDER — LANCETS 28 GAUGE
1 EACH MISCELLANEOUS 2 TIMES DAILY
Qty: 100 EACH | Refills: 3 | Status: SHIPPED | OUTPATIENT
Start: 2018-05-04 | End: 2018-05-10 | Stop reason: SDUPTHER

## 2018-05-10 RX ORDER — BLOOD-GLUCOSE METER
1 KIT MISCELLANEOUS 2 TIMES DAILY
Qty: 1 KIT | Refills: 0 | Status: SHIPPED | OUTPATIENT
Start: 2018-05-10 | End: 2019-01-31 | Stop reason: SDUPTHER

## 2018-05-10 RX ORDER — LANCETS 28 GAUGE
1 EACH MISCELLANEOUS 2 TIMES DAILY
Qty: 100 EACH | Refills: 3 | Status: SHIPPED | OUTPATIENT
Start: 2018-05-10 | End: 2019-01-31 | Stop reason: SDUPTHER

## 2018-05-16 ENCOUNTER — CARE COORDINATION (OUTPATIENT)
Dept: CARE COORDINATION | Age: 68
End: 2018-05-16

## 2018-05-18 ENCOUNTER — CARE COORDINATION (OUTPATIENT)
Dept: CARE COORDINATION | Age: 68
End: 2018-05-18

## 2018-05-18 ASSESSMENT — PATIENT HEALTH QUESTIONNAIRE - PHQ9: SUM OF ALL RESPONSES TO PHQ QUESTIONS 1-9: 0

## 2018-05-22 ENCOUNTER — CARE COORDINATION (OUTPATIENT)
Dept: CARE COORDINATION | Age: 68
End: 2018-05-22

## 2018-06-04 ENCOUNTER — CARE COORDINATION (OUTPATIENT)
Dept: CARE COORDINATION | Age: 68
End: 2018-06-04

## 2018-06-12 ENCOUNTER — CARE COORDINATION (OUTPATIENT)
Dept: CARE COORDINATION | Age: 68
End: 2018-06-12

## 2018-06-13 ENCOUNTER — CARE COORDINATION (OUTPATIENT)
Dept: CARE COORDINATION | Age: 68
End: 2018-06-13

## 2018-07-25 ENCOUNTER — CARE COORDINATION (OUTPATIENT)
Dept: CARE COORDINATION | Age: 68
End: 2018-07-25

## 2018-07-25 ENCOUNTER — OFFICE VISIT (OUTPATIENT)
Dept: FAMILY MEDICINE CLINIC | Age: 68
End: 2018-07-25

## 2018-07-25 VITALS
SYSTOLIC BLOOD PRESSURE: 120 MMHG | HEART RATE: 101 BPM | HEIGHT: 64 IN | OXYGEN SATURATION: 96 % | BODY MASS INDEX: 31.07 KG/M2 | WEIGHT: 182 LBS | DIASTOLIC BLOOD PRESSURE: 80 MMHG

## 2018-07-25 DIAGNOSIS — E66.9 DIABETES MELLITUS TYPE 2 IN OBESE (HCC): ICD-10-CM

## 2018-07-25 DIAGNOSIS — E11.69 DIABETES MELLITUS TYPE 2 IN OBESE (HCC): ICD-10-CM

## 2018-07-25 DIAGNOSIS — R03.0 ELEVATED BLOOD PRESSURE READING WITHOUT DIAGNOSIS OF HYPERTENSION: ICD-10-CM

## 2018-07-25 PROBLEM — M79.89 LEG SWELLING: Status: RESOLVED | Noted: 2017-11-30 | Resolved: 2018-07-25

## 2018-07-25 LAB
ANION GAP SERPL CALCULATED.3IONS-SCNC: 16 MMOL/L (ref 3–16)
BUN BLDV-MCNC: 12 MG/DL (ref 7–20)
CALCIUM SERPL-MCNC: 9.7 MG/DL (ref 8.3–10.6)
CHLORIDE BLD-SCNC: 105 MMOL/L (ref 99–110)
CO2: 23 MMOL/L (ref 21–32)
CREAT SERPL-MCNC: 0.8 MG/DL (ref 0.6–1.2)
GFR AFRICAN AMERICAN: >60
GFR NON-AFRICAN AMERICAN: >60
GLUCOSE BLD-MCNC: 90 MG/DL (ref 70–99)
POTASSIUM SERPL-SCNC: 4.4 MMOL/L (ref 3.5–5.1)
SODIUM BLD-SCNC: 144 MMOL/L (ref 136–145)

## 2018-07-25 PROCEDURE — 4004F PT TOBACCO SCREEN RCVD TLK: CPT | Performed by: FAMILY MEDICINE

## 2018-07-25 PROCEDURE — 1090F PRES/ABSN URINE INCON ASSESS: CPT | Performed by: FAMILY MEDICINE

## 2018-07-25 PROCEDURE — 99213 OFFICE O/P EST LOW 20 MIN: CPT | Performed by: FAMILY MEDICINE

## 2018-07-25 PROCEDURE — 3017F COLORECTAL CA SCREEN DOC REV: CPT | Performed by: FAMILY MEDICINE

## 2018-07-25 PROCEDURE — G8399 PT W/DXA RESULTS DOCUMENT: HCPCS | Performed by: FAMILY MEDICINE

## 2018-07-25 PROCEDURE — G8417 CALC BMI ABV UP PARAM F/U: HCPCS | Performed by: FAMILY MEDICINE

## 2018-07-25 PROCEDURE — 2022F DILAT RTA XM EVC RTNOPTHY: CPT | Performed by: FAMILY MEDICINE

## 2018-07-25 PROCEDURE — 4040F PNEUMOC VAC/ADMIN/RCVD: CPT | Performed by: FAMILY MEDICINE

## 2018-07-25 PROCEDURE — 1101F PT FALLS ASSESS-DOCD LE1/YR: CPT | Performed by: FAMILY MEDICINE

## 2018-07-25 PROCEDURE — G8427 DOCREV CUR MEDS BY ELIG CLIN: HCPCS | Performed by: FAMILY MEDICINE

## 2018-07-25 PROCEDURE — 3044F HG A1C LEVEL LT 7.0%: CPT | Performed by: FAMILY MEDICINE

## 2018-07-25 PROCEDURE — 1123F ACP DISCUSS/DSCN MKR DOCD: CPT | Performed by: FAMILY MEDICINE

## 2018-07-25 ASSESSMENT — ENCOUNTER SYMPTOMS
WHEEZING: 0
ANAL BLEEDING: 0
EYE REDNESS: 0
SINUS PRESSURE: 0
COLOR CHANGE: 0
VOICE CHANGE: 0
EYE DISCHARGE: 0
APNEA: 0
EYE PAIN: 0
BACK PAIN: 0
BLOOD IN STOOL: 0
TROUBLE SWALLOWING: 0
STRIDOR: 0
ABDOMINAL DISTENTION: 0
ABDOMINAL PAIN: 0
PHOTOPHOBIA: 0
NAUSEA: 0
VOMITING: 0
EYE ITCHING: 0
SHORTNESS OF BREATH: 0
RHINORRHEA: 0
DIARRHEA: 0
COUGH: 0
CHEST TIGHTNESS: 0
FACIAL SWELLING: 0
CHOKING: 0
RECTAL PAIN: 0
SORE THROAT: 0
CONSTIPATION: 0

## 2018-07-25 NOTE — PROGRESS NOTES
Subjective:      Patient ID: Ethan Smith is a 76 y.o. female. Diabetes   She presents for her follow-up diabetic visit. She has type 2 diabetes mellitus. MedicAlert identification noted. There are no hypoglycemic associated symptoms. Pertinent negatives for hypoglycemia include no confusion, dizziness, headaches, nervousness/anxiousness, pallor, seizures, speech difficulty or tremors. There are no diabetic associated symptoms. Pertinent negatives for diabetes include no chest pain, no fatigue and no weakness. There are no hypoglycemic complications. There are no diabetic complications. Risk factors for coronary artery disease include family history and obesity. Current diabetic treatment includes diet. She is compliant with treatment all of the time. Her weight is decreasing steadily. Meal planning includes avoidance of concentrated sweets. She participates in exercise daily. Her overall blood glucose range is 110-130 mg/dl. An ACE inhibitor/angiotensin II receptor blocker is not being taken. She does not see a podiatrist.Eye exam is current. Review of Systems   Constitutional: Negative for activity change, appetite change, chills, diaphoresis, fatigue, fever and unexpected weight change. HENT: Negative for congestion, dental problem, drooling, ear discharge, ear pain, facial swelling, hearing loss, mouth sores, nosebleeds, postnasal drip, rhinorrhea, sinus pressure, sneezing, sore throat, tinnitus, trouble swallowing and voice change. Eyes: Negative for photophobia, pain, discharge, redness, itching and visual disturbance. Respiratory: Negative for apnea, cough, choking, chest tightness, shortness of breath, wheezing and stridor. Cardiovascular: Negative for chest pain, palpitations and leg swelling. Gastrointestinal: Negative for abdominal distention, abdominal pain, anal bleeding, blood in stool, constipation, diarrhea, nausea, rectal pain and vomiting.    Genitourinary: Negative for difficulty urinating, dysuria, enuresis, flank pain, frequency, genital sores and hematuria. Musculoskeletal: Negative for arthralgias, back pain, gait problem, joint swelling, myalgias, neck pain and neck stiffness. Skin: Negative for color change, pallor, rash and wound. Neurological: Negative for dizziness, tremors, seizures, syncope, facial asymmetry, speech difficulty, weakness, light-headedness, numbness and headaches. Hematological: Negative for adenopathy. Does not bruise/bleed easily. Psychiatric/Behavioral: Negative for agitation, behavioral problems, confusion, decreased concentration, dysphoric mood, hallucinations, self-injury, sleep disturbance and suicidal ideas. The patient is not nervous/anxious and is not hyperactive. Objective:   Physical Exam   Constitutional: She is oriented to person, place, and time. She appears well-developed and well-nourished. No distress. HENT:   Mouth/Throat: Oropharynx is clear and moist.   Eyes: Conjunctivae are normal. Pupils are equal, round, and reactive to light. Neck: No JVD present. No tracheal deviation present. No thyromegaly present. Cardiovascular: Normal rate, regular rhythm, normal heart sounds and intact distal pulses. Exam reveals no gallop. No murmur heard. Pulmonary/Chest: Effort normal and breath sounds normal. No stridor. No respiratory distress. She has no wheezes. She has no rales. She exhibits no tenderness. Abdominal: Soft. Bowel sounds are normal. She exhibits no distension and no mass. There is no tenderness. Musculoskeletal: She exhibits no edema or tenderness. Lymphadenopathy:     She has no cervical adenopathy. Neurological: She is alert and oriented to person, place, and time. She displays normal reflexes. No cranial nerve deficit. She exhibits normal muscle tone. Coordination normal.   Skin: Skin is warm and dry. No rash noted. No erythema. No pallor. Psychiatric: She has a normal mood and affect.  Her behavior is normal. Judgment and thought content normal.   Vitals reviewed.       Assessment:      Diabetes mellitus type 2 in obese [facility])  Has lost 15 lbs on diet--not taking metformin    Elevated blood pressure reading without diagnosis of hypertension  Better with weight loss          Plan:      above

## 2018-07-25 NOTE — CARE COORDINATION
Ambulatory Care Coordination Note  7/25/2018  CM Risk Score: 7  Mikey Mortality Risk Score: 4.56    ACC: Jodi Freeman, RN    Summary Note: Reports is doing good and fsbs are doing much better. Reviewed diabetic, low fat, low sodium diets. Liborio has cut down on coffee with cream to 2 cups, decreased carbs and portions. Liborio felt like working with MELVI Cantu, [address] really helped her to understand her diet choices. Liborio has lost 15 # and plans to lose more. Encouraged to call with any questions. Diabetes Assessment    Medic Alert ID:  No  Meal Planning:  Avoidance of concentrated sweets, Plate Method, Carb counting, Calorie counting   How often do you test your blood sugar?:  Other (Comment: BID)   Do you have barriers with adherence to non-pharmacologic self-management interventions? (Nutrition/Exercise/Self-Monitoring):  No   Have you ever had to go to the ED for symptoms of low blood sugar?:  No       No patient-reported symptoms                Care Coordination Interventions    Program Enrollment:  Complex Care  Referral from Primary Care Provider:  Yes  Suggested Interventions and Community Resources  Diabetes Education:  Completed  Registered Dietician:  Completed  Zone Management Tools:  Completed         Goals Addressed             Most Recent     Self Monitoring   Improving (7/25/2018)             Self-Monitored Blood Glucose - I will notify my provider of any trends of increasing or decreasing blood sugars over a 1 month period. Patient Reported Blood Glucose No flowsheet data found. Barriers: lack of education  Plan for overcoming my barriers: work w [facility] and dietician  Confidence: 9/10  Anticipated Goal Completion Date: 8/18/18              Prior to Admission medications    Medication Sig Start Date End Date Taking?  Authorizing Provider   glucose monitoring kit (FREESTYLE) monitoring kit 1 kit by Does not apply route 2 times daily Dx: E11.8 5/10/18   She Brown MD   glucose blood VI test strips (FREESTYLE TEST STRIPS) strip 1 each by In Vitro route 2 times daily As needed. Dx: E11.8 5/10/18   Brendan Mayorga MD   FREESTYLE LANCETS MISC 1 each by Does not apply route 2 times daily Dx: E11.8 5/10/18   Brendan Mayorga MD   metFORMIN (GLUCOPHAGE-XR) 500 MG extended release tablet Take 1 tablet by mouth daily (with breakfast) 4/25/18   Brendan Mayorga MD   albuterol sulfate HFA (VENTOLIN HFA) 108 (90 Base) MCG/ACT inhaler Inhale 2 puffs into the lungs every 6 hours as needed for Wheezing 3/19/18   Brendan Mayorga MD   Lift Chair MISC by Does not apply route 2/26/18   Brendan Mayorga MD   tiZANidine (ZANAFLEX) 4 MG tablet Take 1 tablet by mouth 3 times daily 12/18/17   Brendan Mayorga MD   meloxicam [facility]) 15 MG tablet Take 1 tablet by mouth daily 12/18/17   Brendan Mayorga MD   acetaminophen (TYLENOL) 325 MG tablet Take 650 mg by mouth every 6 hours as needed for Pain    Historical Provider, MD   vitamin B-12 (CYANOCOBALAMIN) 1000 MCG tablet Take 1,000 mcg by mouth daily    Historical Provider, MD   Calcium-Magnesium-Vitamin D (CALCIUM 500 PO) Take by mouth    Historical Provider, MD   vitamin D-3 (CHOLECALCIFEROL) 5000 UNITS TABS Take 5,000 Units by mouth daily. Historical Provider, MD       No future appointments.

## 2018-07-26 LAB
ESTIMATED AVERAGE GLUCOSE: 131.2 MG/DL
HBA1C MFR BLD: 6.2 %

## 2018-08-24 ENCOUNTER — CARE COORDINATION (OUTPATIENT)
Dept: CARE COORDINATION | Age: 68
End: 2018-08-24

## 2018-08-24 NOTE — CARE COORDINATION
kit 1 kit by Does not apply route 2 times daily Dx: E11.8 5/10/18   Milvia Berg MD   glucose blood VI test strips (FREESTYLE TEST STRIPS) strip 1 each by In Vitro route 2 times daily As needed. Dx: E11.8 5/10/18   Milvia Berg MD   FREESTYLE LANCETS MISC 1 each by Does not apply route 2 times daily Dx: E11.8 5/10/18   Milvia Berg MD   metFORMIN (GLUCOPHAGE-XR) 500 MG extended release tablet Take 1 tablet by mouth daily (with breakfast) 4/25/18   Milvia Berg MD   albuterol sulfate HFA (VENTOLIN HFA) 108 (90 Base) MCG/ACT inhaler Inhale 2 puffs into the lungs every 6 hours as needed for Wheezing 3/19/18   Milvia Berg MD   Lift Chair MISC by Does not apply route 2/26/18   Milvia Berg MD   tiZANidine (ZANAFLEX) 4 MG tablet Take 1 tablet by mouth 3 times daily 12/18/17   Milvia Berg MD   meloxicam [facility]) 15 MG tablet Take 1 tablet by mouth daily 12/18/17   Milvia Berg MD   acetaminophen (TYLENOL) 325 MG tablet Take 650 mg by mouth every 6 hours as needed for Pain    Historical Provider, MD   vitamin B-12 (CYANOCOBALAMIN) 1000 MCG tablet Take 1,000 mcg by mouth daily    Historical Provider, MD   Calcium-Magnesium-Vitamin D (CALCIUM 500 PO) Take by mouth    Historical Provider, MD   vitamin D-3 (CHOLECALCIFEROL) 5000 UNITS TABS Take 5,000 Units by mouth daily. Historical Provider, MD       No future appointments.

## 2018-09-26 ENCOUNTER — CARE COORDINATION (OUTPATIENT)
Dept: FAMILY MEDICINE CLINIC | Age: 68
End: 2018-09-26

## 2018-09-26 NOTE — CARE COORDINATION
Ambulatory Care Coordination Note  9/26/2018  CM Risk Score: 3  Mikey Mortality Risk Score: 4.95    ACC: Jodi Freeman, RN    Summary Note: Attempted to call home and cell phones. Sounded like someone picked up both phones and then hung up without answering so unable to lm. Care Coordination Interventions    Program Enrollment:  Complex Care  Referral from Primary Care Provider:  Yes  Suggested Interventions and Community Resources  Diabetes Education:  Completed  Registered Dietician:  Completed  Zone Management Tools:  Completed         Goals Addressed     None          Prior to Admission medications    Medication Sig Start Date End Date Taking? Authorizing Provider   glucose monitoring kit (FREESTYLE) monitoring kit 1 kit by Does not apply route 2 times daily Dx: E11.8 5/10/18   Renetta Almaraz MD   glucose blood VI test strips (FREESTYLE TEST STRIPS) strip 1 each by In Vitro route 2 times daily As needed.   Dx: E11.8 5/10/18   Renetta Almaraz MD   FREESTYLE LANCETS MISC 1 each by Does not apply route 2 times daily Dx: E11.8 5/10/18   Renetta Almaraz MD   metFORMIN (GLUCOPHAGE-XR) 500 MG extended release tablet Take 1 tablet by mouth daily (with breakfast) 4/25/18   Renetta Almaraz MD   albuterol sulfate HFA (VENTOLIN HFA) 108 (90 Base) MCG/ACT inhaler Inhale 2 puffs into the lungs every 6 hours as needed for Wheezing 3/19/18   Renetta Almaraz MD   Lift Chair MISC by Does not apply route 2/26/18   Renetta Almaraz MD   tiZANidine (ZANAFLEX) 4 MG tablet Take 1 tablet by mouth 3 times daily 12/18/17   Renetta Almaraz MD   meloxicam [facility]) 15 MG tablet Take 1 tablet by mouth daily 12/18/17   Renetta Almaraz MD   acetaminophen (TYLENOL) 325 MG tablet Take 650 mg by mouth every 6 hours as needed for Pain    Historical Provider, MD   vitamin B-12 (CYANOCOBALAMIN) 1000 MCG tablet Take 1,000 mcg by mouth daily    Historical Provider, MD   Calcium-Magnesium-Vitamin

## 2018-09-26 NOTE — CARE COORDINATION
Ambulatory Care Coordination Note  9/26/2018  CM Risk Score: 3  Mikey Mortality Risk Score: 4.95    ACC: Jodi Freeman, RN    Summary Note: Returning call. Reports fsbs are doing good and usually in the 90's in am but occasionally goes up if eats something like cake. Reviewed diabetic, low fat, low sodium diets. Reports has lost another 4 lbs. Goal met. Reports taking the same medications and no changes. Met with Sarah Mulligan RD and aware can call her still. Pt is aware that Dr Deshaun Dai is moving to [facility] and that ELHAM Mirmarta Tamara will be reaching out sometime in October. Diabetes Assessment    Medic Alert ID:  No  Meal Planning:  Avoidance of concentrated sweets, Plate Method, Carb counting, Calorie counting   How often do you test your blood sugar?:  Other (Comment: BID)   Do you have barriers with adherence to non-pharmacologic self-management interventions? (Nutrition/Exercise/Self-Monitoring):  No   Have you ever had to go to the ED for symptoms of low blood sugar?:  No       No patient-reported symptoms              Care Coordination Interventions    Program Enrollment:  Complex Care  Referral from Primary Care Provider:  Yes  Suggested Interventions and Community Resources  Diabetes Education:  Completed  Registered Dietician:  Completed  Zone Management Tools:  Completed         Goals Addressed             Most Recent     COMPLETED: Self Monitoring   Improving (8/24/2018)             Self-Monitored Blood Glucose - I will notify my provider of any trends of increasing or decreasing blood sugars over a 1 month period. Patient Reported Blood Glucose No flowsheet data found. Barriers: lack of education  Plan for overcoming my barriers: work w [facility] and dietician  Confidence: 9/10  Anticipated Goal Completion Date: 8/18/18              Prior to Admission medications    Medication Sig Start Date End Date Taking?  Authorizing Provider   glucose monitoring kit (FREESTYLE) monitoring kit 1 kit by Does not apply route 2 times daily Dx: E11.8 5/10/18   Erik Heart MD   glucose blood VI test strips (FREESTYLE TEST STRIPS) strip 1 each by In Vitro route 2 times daily As needed. Dx: E11.8 5/10/18   Erik Heart MD   FREESTYLE LANCETS MISC 1 each by Does not apply route 2 times daily Dx: E11.8 5/10/18   Erik Heart MD   metFORMIN (GLUCOPHAGE-XR) 500 MG extended release tablet Take 1 tablet by mouth daily (with breakfast) 4/25/18   Erik Heart MD   albuterol sulfate HFA (VENTOLIN HFA) 108 (90 Base) MCG/ACT inhaler Inhale 2 puffs into the lungs every 6 hours as needed for Wheezing 3/19/18   Erik Heart MD   Lift Chair MISC by Does not apply route 2/26/18   Erik Heart MD   tiZANidine (ZANAFLEX) 4 MG tablet Take 1 tablet by mouth 3 times daily 12/18/17   Erik Heart MD   meloxicam [facility]) 15 MG tablet Take 1 tablet by mouth daily 12/18/17   Erik Heart MD   acetaminophen (TYLENOL) 325 MG tablet Take 650 mg by mouth every 6 hours as needed for Pain    Historical Provider, MD   vitamin B-12 (CYANOCOBALAMIN) 1000 MCG tablet Take 1,000 mcg by mouth daily    Historical Provider, MD   Calcium-Magnesium-Vitamin D (CALCIUM 500 PO) Take by mouth    Historical Provider, MD   vitamin D-3 (CHOLECALCIFEROL) 5000 UNITS TABS Take 5,000 Units by mouth daily.     Historical Provider, MD       Future Appointments  Date Time Provider Daniela Ha   10/25/2018 10:45 AM MD JANELL Gaming [address] [place] [address]

## 2018-10-24 ENCOUNTER — TELEPHONE (OUTPATIENT)
Dept: FAMILY MEDICINE CLINIC | Age: 68
End: 2018-10-24

## 2018-10-24 DIAGNOSIS — E66.9 DIABETES MELLITUS TYPE 2 IN OBESE (HCC): Primary | ICD-10-CM

## 2018-10-24 DIAGNOSIS — R03.0 ELEVATED BLOOD PRESSURE READING WITHOUT DIAGNOSIS OF HYPERTENSION: ICD-10-CM

## 2018-10-24 DIAGNOSIS — E53.8 B12 DEFICIENCY: ICD-10-CM

## 2018-10-24 DIAGNOSIS — E11.69 DIABETES MELLITUS TYPE 2 IN OBESE (HCC): Primary | ICD-10-CM

## 2018-10-24 DIAGNOSIS — E55.9 VITAMIN D DEFICIENCY: ICD-10-CM

## 2018-10-25 ENCOUNTER — OFFICE VISIT (OUTPATIENT)
Dept: FAMILY MEDICINE CLINIC | Age: 68
End: 2018-10-25
Payer: MEDICARE

## 2018-10-25 VITALS
DIASTOLIC BLOOD PRESSURE: 78 MMHG | WEIGHT: 175 LBS | SYSTOLIC BLOOD PRESSURE: 132 MMHG | RESPIRATION RATE: 16 BRPM | OXYGEN SATURATION: 99 % | HEIGHT: 64 IN | HEART RATE: 73 BPM | BODY MASS INDEX: 29.88 KG/M2

## 2018-10-25 DIAGNOSIS — E11.9 TYPE 2 DIABETES MELLITUS WITHOUT COMPLICATION, WITHOUT LONG-TERM CURRENT USE OF INSULIN (HCC): Primary | ICD-10-CM

## 2018-10-25 DIAGNOSIS — R73.9 HYPERGLYCEMIA: ICD-10-CM

## 2018-10-25 DIAGNOSIS — K62.1 COLORECTAL POLYPS: ICD-10-CM

## 2018-10-25 DIAGNOSIS — K63.5 COLORECTAL POLYPS: ICD-10-CM

## 2018-10-25 LAB — HBA1C MFR BLD: 6.1 %

## 2018-10-25 PROCEDURE — 83036 HEMOGLOBIN GLYCOSYLATED A1C: CPT | Performed by: FAMILY MEDICINE

## 2018-10-25 PROCEDURE — 99213 OFFICE O/P EST LOW 20 MIN: CPT | Performed by: FAMILY MEDICINE

## 2018-10-25 ASSESSMENT — ENCOUNTER SYMPTOMS
SORE THROAT: 0
APNEA: 0
ABDOMINAL DISTENTION: 0
ABDOMINAL PAIN: 0
RECTAL PAIN: 0
VOMITING: 0
NAUSEA: 0
FACIAL SWELLING: 0
COLOR CHANGE: 0
CHEST TIGHTNESS: 0
EYE ITCHING: 0
SHORTNESS OF BREATH: 0
EYE DISCHARGE: 0
VOICE CHANGE: 0
CHOKING: 0
WHEEZING: 0
BLOOD IN STOOL: 0
DIARRHEA: 0
CONSTIPATION: 0
BACK PAIN: 0
SINUS PRESSURE: 0
EYE REDNESS: 0
RHINORRHEA: 0
EYE PAIN: 0
PHOTOPHOBIA: 0
STRIDOR: 0
TROUBLE SWALLOWING: 0
COUGH: 0
ANAL BLEEDING: 0

## 2018-10-25 NOTE — PROGRESS NOTES
Subjective:     Patient ID:Marychuy Nelson is a 76 y.o. female. Diabetes   She presents for her follow-up diabetic visit. She has type 2 diabetes mellitus. No MedicAlert identification noted. Her disease course has been stable. There are no hypoglycemic associated symptoms. Pertinent negatives for hypoglycemia include no confusion, dizziness, headaches, nervousness/anxiousness, pallor, seizures, speech difficulty or tremors. There are no diabetic associated symptoms. Pertinent negatives for diabetes include no chest pain, no fatigue and no weakness. There are no hypoglycemic complications. Symptoms are stable. There are no diabetic complications. Risk factors for coronary artery disease include family history. Current diabetic treatment includes diet. She is compliant with treatment all of the time. Her weight is decreasing steadily. She is following a generally healthy diet. Meal planning includes avoidance of concentrated sweets. She has not had a previous visit with a dietitian. She participates in exercise three times a week. Her home blood glucose trend is decreasing steadily. Her overall blood glucose range is 110-130 mg/dl. An ACE inhibitor/angiotensin II receptor blocker is being taken. She does not see a podiatrist.Eye exam is not current. a1c 6.1  Allergies   Allergen Reactions    Glucosamine Chondroitin Joint [Glucos-Chondroit-Hyaluron-Msm] Itching     Use liquid form- had itching and metallic taste in mouth    Relafen [Nabumetone] Itching and Swelling       Current Outpatient Prescriptions   Medication Sig Dispense Refill    glucose monitoring kit (FREESTYLE) monitoring kit 1 kit by Does not apply route 2 times daily Dx: E11.8 1 kit 0    glucose blood VI test strips (FREESTYLE TEST STRIPS) strip 1 each by In Vitro route 2 times daily As needed.   Dx: E11.8 100 each 3    FREESTYLE LANCETS MISC 1 each by Does not apply route 2 times daily Dx: E11.8 100 each 3    albuterol sulfate HFA

## 2018-11-26 ENCOUNTER — CARE COORDINATION (OUTPATIENT)
Dept: CARE COORDINATION | Age: 68
End: 2018-11-26

## 2018-11-27 ENCOUNTER — CARE COORDINATION (OUTPATIENT)
Dept: CARE COORDINATION | Age: 68
End: 2018-11-27

## 2018-11-27 NOTE — CARE COORDINATION
Patient returned call. Patient is doing great and she stated that her diet is right on track. Her weight is down to 175 lbs. She has lost about 25 pounds since April. Her numbers are down and her last updated A1C was 6.0%. This AM, her blood sugar was 94 mg/dL. She stated that she wakes up around 5:30 am but doesn't eat until about 9/9:30 am. Usually, she waits within one hour after eating to check sugar and is averaging 126 mg/dL. RD praised efforts of controlled sugar. She states that she CHO Counts at every meal and stays <45 g CHO per meal. Patient asked questions regarding fat intake and RD discussed fat ranges/healthy fats/etc. Patient verbalized understanding. Discussed exercise and patient stated that she hasn't been exercising as much as she would like d/t cold weather. Discussed different options to increase PA inside. Patient expressed no other concerns/questions at this time and states that she will outreach to RD if any arise. RD stated that she will contact patient next month to f/u. Patient agreeable to outreach. Will notify [facility] of outreach.

## 2018-11-27 NOTE — CARE COORDINATION
RD outreach to f/u with patient per [facility] request. LM and request for call back to continue f/u. Will wait for patient return call. Will notify [facility] of outreach.

## 2018-12-04 RX ORDER — BLOOD-GLUCOSE METER
KIT MISCELLANEOUS
Qty: 100 STRIP | Refills: 5 | Status: SHIPPED | OUTPATIENT
Start: 2018-12-04 | End: 2018-12-07 | Stop reason: SDUPTHER

## 2018-12-07 ENCOUNTER — TELEPHONE (OUTPATIENT)
Dept: FAMILY MEDICINE CLINIC | Age: 68
End: 2018-12-07

## 2018-12-07 DIAGNOSIS — E11.69 DIABETES MELLITUS TYPE 2 IN OBESE (HCC): Primary | ICD-10-CM

## 2018-12-07 DIAGNOSIS — E66.9 DIABETES MELLITUS TYPE 2 IN OBESE (HCC): Primary | ICD-10-CM

## 2018-12-10 ENCOUNTER — TELEPHONE (OUTPATIENT)
Dept: FAMILY MEDICINE CLINIC | Age: 68
End: 2018-12-10

## 2018-12-10 PROBLEM — E11.65 UNCONTROLLED TYPE 2 DIABETES MELLITUS WITH HYPERGLYCEMIA (HCC): Status: ACTIVE | Noted: 2018-04-25

## 2018-12-18 ENCOUNTER — CARE COORDINATION (OUTPATIENT)
Dept: CARE COORDINATION | Age: 68
End: 2018-12-18

## 2018-12-18 NOTE — TELEPHONE ENCOUNTER
Noted. Unfortunate that her vm isn't functional. Pt seemed so motivated for ongoing engagement w/RD. Thanks for attempting outreach to her. Will definitely review during next [facility] outreach, when accomplished.

## 2018-12-18 NOTE — CARE COORDINATION
RD outreach to f/u with patient. Spoke with patient about one month ago and seems to be on track with managing DM. Patient did not answer and could not leave message d/t VM being full. Will wait for patient return call to continue outreach and will notify [address] of update. Will sign off of care team in the beginning of year if no return call.

## 2018-12-19 ENCOUNTER — CARE COORDINATION (OUTPATIENT)
Dept: CARE COORDINATION | Age: 68
End: 2018-12-19

## 2019-01-15 ENCOUNTER — CARE COORDINATION (OUTPATIENT)
Dept: CARE COORDINATION | Age: 69
End: 2019-01-15

## 2019-01-31 RX ORDER — BLOOD-GLUCOSE METER
1 KIT MISCELLANEOUS 2 TIMES DAILY
Qty: 1 KIT | Refills: 0 | Status: SHIPPED | OUTPATIENT
Start: 2019-01-31 | End: 2021-09-14 | Stop reason: SDUPTHER

## 2019-01-31 RX ORDER — LANCETS 28 GAUGE
1 EACH MISCELLANEOUS 2 TIMES DAILY
Qty: 100 EACH | Refills: 3 | Status: SHIPPED | OUTPATIENT
Start: 2019-01-31

## 2019-02-18 ENCOUNTER — OFFICE VISIT (OUTPATIENT)
Dept: FAMILY MEDICINE CLINIC | Age: 69
End: 2019-02-18
Payer: MEDICARE

## 2019-02-18 VITALS
WEIGHT: 173 LBS | TEMPERATURE: 98.6 F | SYSTOLIC BLOOD PRESSURE: 138 MMHG | OXYGEN SATURATION: 96 % | DIASTOLIC BLOOD PRESSURE: 86 MMHG | BODY MASS INDEX: 29.7 KG/M2 | HEART RATE: 90 BPM

## 2019-02-18 DIAGNOSIS — J02.9 ACUTE PHARYNGITIS, UNSPECIFIED ETIOLOGY: Primary | ICD-10-CM

## 2019-02-18 DIAGNOSIS — E11.65 UNCONTROLLED TYPE 2 DIABETES MELLITUS WITH HYPERGLYCEMIA (HCC): ICD-10-CM

## 2019-02-18 DIAGNOSIS — K21.9 GASTROESOPHAGEAL REFLUX DISEASE, ESOPHAGITIS PRESENCE NOT SPECIFIED: ICD-10-CM

## 2019-02-18 PROCEDURE — 3288F FALL RISK ASSESSMENT DOCD: CPT | Performed by: NURSE PRACTITIONER

## 2019-02-18 PROCEDURE — 99213 OFFICE O/P EST LOW 20 MIN: CPT | Performed by: NURSE PRACTITIONER

## 2019-02-18 PROCEDURE — G8510 SCR DEP NEG, NO PLAN REQD: HCPCS | Performed by: NURSE PRACTITIONER

## 2019-02-18 ASSESSMENT — ENCOUNTER SYMPTOMS
CHEST TIGHTNESS: 0
COUGH: 1
EYE DISCHARGE: 0
ABDOMINAL DISTENTION: 0
EYE ITCHING: 0
RHINORRHEA: 1
CONSTIPATION: 0
ABDOMINAL PAIN: 0
VOICE CHANGE: 0
EYE PAIN: 0
DIARRHEA: 0
SINUS PRESSURE: 0
BACK PAIN: 0
TROUBLE SWALLOWING: 0
EYE REDNESS: 0
SORE THROAT: 1
VOMITING: 0
WHEEZING: 0
STRIDOR: 0
SHORTNESS OF BREATH: 0
SINUS PAIN: 0
NAUSEA: 0

## 2019-02-18 ASSESSMENT — PATIENT HEALTH QUESTIONNAIRE - PHQ9
1. LITTLE INTEREST OR PLEASURE IN DOING THINGS: 0
SUM OF ALL RESPONSES TO PHQ QUESTIONS 1-9: 0
SUM OF ALL RESPONSES TO PHQ9 QUESTIONS 1 & 2: 0
SUM OF ALL RESPONSES TO PHQ QUESTIONS 1-9: 0
2. FEELING DOWN, DEPRESSED OR HOPELESS: 0

## 2019-03-07 ENCOUNTER — CARE COORDINATION (OUTPATIENT)
Dept: CARE COORDINATION | Age: 69
End: 2019-03-07

## 2019-04-25 ENCOUNTER — OFFICE VISIT (OUTPATIENT)
Dept: FAMILY MEDICINE CLINIC | Age: 69
End: 2019-04-25
Payer: MEDICARE

## 2019-04-25 ENCOUNTER — CARE COORDINATION (OUTPATIENT)
Dept: CARE COORDINATION | Age: 69
End: 2019-04-25

## 2019-04-25 VITALS
HEART RATE: 79 BPM | HEIGHT: 64 IN | SYSTOLIC BLOOD PRESSURE: 138 MMHG | WEIGHT: 173.8 LBS | OXYGEN SATURATION: 99 % | DIASTOLIC BLOOD PRESSURE: 88 MMHG | BODY MASS INDEX: 29.67 KG/M2

## 2019-04-25 DIAGNOSIS — R03.0 ELEVATED BLOOD PRESSURE READING WITHOUT DIAGNOSIS OF HYPERTENSION: ICD-10-CM

## 2019-04-25 DIAGNOSIS — R73.9 HYPERGLYCEMIA: ICD-10-CM

## 2019-04-25 LAB
ANION GAP SERPL CALCULATED.3IONS-SCNC: 13 MMOL/L (ref 3–16)
BUN BLDV-MCNC: 10 MG/DL (ref 7–20)
CALCIUM SERPL-MCNC: 9.2 MG/DL (ref 8.3–10.6)
CHLORIDE BLD-SCNC: 103 MMOL/L (ref 99–110)
CO2: 25 MMOL/L (ref 21–32)
CREAT SERPL-MCNC: 0.8 MG/DL (ref 0.6–1.2)
GFR AFRICAN AMERICAN: >60
GFR NON-AFRICAN AMERICAN: >60
GLUCOSE BLD-MCNC: 98 MG/DL (ref 70–99)
POTASSIUM SERPL-SCNC: 4.4 MMOL/L (ref 3.5–5.1)
SODIUM BLD-SCNC: 141 MMOL/L (ref 136–145)

## 2019-04-25 PROCEDURE — G8399 PT W/DXA RESULTS DOCUMENT: HCPCS | Performed by: FAMILY MEDICINE

## 2019-04-25 PROCEDURE — 4040F PNEUMOC VAC/ADMIN/RCVD: CPT | Performed by: FAMILY MEDICINE

## 2019-04-25 PROCEDURE — 1123F ACP DISCUSS/DSCN MKR DOCD: CPT | Performed by: FAMILY MEDICINE

## 2019-04-25 PROCEDURE — 3017F COLORECTAL CA SCREEN DOC REV: CPT | Performed by: FAMILY MEDICINE

## 2019-04-25 PROCEDURE — 1090F PRES/ABSN URINE INCON ASSESS: CPT | Performed by: FAMILY MEDICINE

## 2019-04-25 PROCEDURE — G8427 DOCREV CUR MEDS BY ELIG CLIN: HCPCS | Performed by: FAMILY MEDICINE

## 2019-04-25 PROCEDURE — G8417 CALC BMI ABV UP PARAM F/U: HCPCS | Performed by: FAMILY MEDICINE

## 2019-04-25 PROCEDURE — 99213 OFFICE O/P EST LOW 20 MIN: CPT | Performed by: FAMILY MEDICINE

## 2019-04-25 PROCEDURE — 4004F PT TOBACCO SCREEN RCVD TLK: CPT | Performed by: FAMILY MEDICINE

## 2019-04-25 ASSESSMENT — ENCOUNTER SYMPTOMS
BLOOD IN STOOL: 0
EYE REDNESS: 0
VOICE CHANGE: 0
VOMITING: 0
CHEST TIGHTNESS: 0
CHOKING: 0
BACK PAIN: 0
ABDOMINAL DISTENTION: 0
ABDOMINAL PAIN: 0
STRIDOR: 0
SORE THROAT: 0
SINUS PRESSURE: 0
EYE ITCHING: 0
FACIAL SWELLING: 0
DIARRHEA: 0
PHOTOPHOBIA: 0
CONSTIPATION: 0
WHEEZING: 0
COLOR CHANGE: 0
RECTAL PAIN: 0
TROUBLE SWALLOWING: 0
RHINORRHEA: 0
SHORTNESS OF BREATH: 0
NAUSEA: 0
EYE PAIN: 0
APNEA: 0
COUGH: 0
ANAL BLEEDING: 0
EYE DISCHARGE: 0

## 2019-04-25 NOTE — PROGRESS NOTES
HFA (VENTOLIN HFA) 108 (90 Base) MCG/ACT inhaler Inhale 2 puffs into the lungs every 6 hours as needed for Wheezing 1 Inhaler 3    tiZANidine (ZANAFLEX) 4 MG tablet Take 1 tablet by mouth 3 times daily 60 tablet 2     No current facility-administered medications for this visit. Past Medical History:   Diagnosis Date    Arthritis     Bile induced gastritis     Chest pain 9/11    VENECIA--neg lexiscan    Chronic back pain     Colorectal polyps     Diabetes mellitus type 2 in obese ([address] 4/25/2018    Diverticulosis     GERD (gastroesophageal reflux disease)     Helicobacter pylori gastritis 2/2012    Postmenopausal bleeding 5/18/2010    Pregnancy     2 children - vaginal deliveries    Spinal stenosis        Past Surgical History:   Procedure Laterality Date    CARPAL TUNNEL RELEASE  2006    CHOLECYSTECTOMY  1996    COLONOSCOPY  [phone]    [id number]    MENISCECTOMY  2008    L knee    TUBAL LIGATION  1985       Social History     Socioeconomic History    Marital status: Single     Spouse name: Not on file    Number of children: 2    Years of education: Not on file    Highest education level: Not on file   Occupational History    Occupation: [employer]   Social Needs    Financial resource strain: Not on file    Food insecurity:     Worry: Not on file     Inability: Not on file    Transportation needs:     Medical: Not on file     Non-medical: Not on file   Tobacco Use    Smoking status: Current Every Day Smoker     Types: Cigars    Smokeless tobacco: Never Used   Substance and Sexual Activity    Alcohol use:  Yes     Alcohol/week: 0.6 oz     Types: 1 Cans of beer per week    Drug use: Not on file    Sexual activity: Not Currently     Partners: Male   Lifestyle    Physical activity:     Days per week: Not on file     Minutes per session: Not on file    Stress: Not on file   Relationships    Social connections:     Talks on phone: Not on file     Gets together: Not on file Attends Congregation service: Not on file     Active member of club or organization: Not on file     Attends meetings of clubs or organizations: Not on file     Relationship status: Not on file    Intimate partner violence:     Fear of current or ex partner: Not on file     Emotionally abused: Not on file     Physically abused: Not on file     Forced sexual activity: Not on file   Other Topics Concern    Not on file   Social History Narrative    Exercise rare    Lives by self    Single    Now retired    [address] and grandchildren       Family History   Problem Relation Age of Onset    Diabetes Mother     Hypertension Mother     Diabetes Father     Hypertension Father     Diabetes Sister     Hypertension Sister    Krishna Gola Sister          at 40    Cancer Other         breast/colon cancer    Diabetes Sister     Thyroid Disease Sister     Other Brother 79        abdominal problems       Immunization History   Administered Date(s) Administered    Influenza Virus Vaccine 2011, 10/08/2013, 10/16/2014    Influenza, High Dose (Fluzone 65 yrs and older) 2015, 10/12/2018    Pneumococcal 13-valent Conjugate ([id number]) 2015    Pneumococcal Polysaccharide (Hyndytddh90) 2011, 2018    Tdap (Boostrix, Adacel) 2013    Tetanus 2008    Zoster Live (Zostavax) 10/02/2012       Review of Systems  Review of Systems   Constitutional: Negative for activity change, appetite change, chills, diaphoresis, fatigue, fever and unexpected weight change. HENT: Negative for congestion, dental problem, drooling, ear discharge, ear pain, facial swelling, hearing loss, mouth sores, nosebleeds, postnasal drip, rhinorrhea, sinus pressure, sneezing, sore throat, tinnitus, trouble swallowing and voice change. Eyes: Negative for photophobia, pain, discharge, redness, itching and visual disturbance.    Respiratory: Negative for apnea, cough, choking, chest tightness, shortness of breath, wheezing and stridor. Cardiovascular: Negative for chest pain, palpitations and leg swelling. Gastrointestinal: Negative for abdominal distention, abdominal pain, anal bleeding, blood in stool, constipation, diarrhea, nausea, rectal pain and vomiting. Genitourinary: Negative for difficulty urinating, dysuria, enuresis, flank pain, frequency, genital sores and hematuria. Musculoskeletal: Negative for arthralgias, back pain, gait problem, joint swelling, myalgias, neck pain and neck stiffness. Skin: Negative for color change, pallor, rash and wound. Neurological: Negative for dizziness, tremors, seizures, syncope, facial asymmetry, speech difficulty, weakness, light-headedness, numbness and headaches. Hematological: Negative for adenopathy. Does not bruise/bleed easily. Psychiatric/Behavioral: Negative for agitation, behavioral problems, confusion, decreased concentration, dysphoric mood, hallucinations, self-injury, sleep disturbance and suicidal ideas. The patient is not nervous/anxious and is not hyperactive. Objective:   Physical Exam  Physical Exam   Constitutional: She is oriented to person, place, and time. She appears well-developed and well-nourished. No distress. HENT:   Mouth/Throat: Oropharynx is clear and moist.   Eyes: Pupils are equal, round, and reactive to light. Conjunctivae are normal.   Neck: No JVD present. No tracheal deviation present. No thyromegaly present. Cardiovascular: Normal rate, regular rhythm, normal heart sounds and intact distal pulses. Exam reveals no gallop. No murmur heard. Hers is about 30 points lower than ours   Pulmonary/Chest: Effort normal and breath sounds normal. No stridor. No respiratory distress. She has no wheezes. She has no rales. She exhibits no tenderness. Abdominal: Soft. Bowel sounds are normal. She exhibits no distension and no mass. There is no tenderness. Musculoskeletal: She exhibits no edema or tenderness. Lymphadenopathy:     She has no cervical adenopathy. Neurological: She is alert and oriented to person, place, and time. She displays normal reflexes. No cranial nerve deficit. She exhibits normal muscle tone. Coordination normal.   Skin: Skin is warm and dry. No rash noted. No erythema. No pallor. Psychiatric: She has a normal mood and affect. Her behavior is normal. Judgment and thought content normal.   Vitals reviewed. No visits with results within 1 Month(s) from this visit.    Latest known visit with results is:   Orders Only on 10/25/2018   Component Date Value Ref Range Status    TSH 10/25/2018 1.31  0.27 - 4.20 uIU/mL Final    Sodium 10/25/2018 143  136 - 145 mmol/L Final    Potassium 10/25/2018 4.5  3.5 - 5.1 mmol/L Final    Chloride 10/25/2018 104  99 - 110 mmol/L Final    CO2 10/25/2018 25  21 - 32 mmol/L Final    Anion Gap 10/25/2018 14  3 - 16 Final    Glucose 10/25/2018 96  70 - 99 mg/dL Final    BUN 10/25/2018 11  7 - 20 mg/dL Final    CREATININE 10/25/2018 0.8  0.6 - 1.2 mg/dL Final    GFR Non- 10/25/2018 >60  >60 Final    GFR  10/25/2018 >60  >60 Final    Calcium 10/25/2018 9.4  8.3 - 10.6 mg/dL Final    Total Protein 10/25/2018 7.5  6.4 - 8.2 g/dL Final    Alb 10/25/2018 4.5  3.4 - 5.0 g/dL Final    Albumin/Globulin Ratio 10/25/2018 1.5  1.1 - 2.2 Final    Total Bilirubin 10/25/2018 0.3  0.0 - 1.0 mg/dL Final    Alkaline Phosphatase 10/25/2018 65  40 - 129 U/L Final    ALT 10/25/2018 <5* 10 - 40 U/L Final    AST 10/25/2018 14* 15 - 37 U/L Final    Globulin 10/25/2018 3.0  g/dL Final    Cholesterol, Fasting 10/25/2018 153  0 - 199 mg/dL Final    Triglyceride, Fasting 10/25/2018 76  0 - 150 mg/dL Final    HDL 10/25/2018 41  40 - 60 mg/dL Final    LDL Calculated 10/25/2018 97  <100 mg/dL Final    VLDL Cholesterol Calculated 10/25/2018 15  Not Established mg/dL Final    Hemoglobin A1C 10/25/2018 6.0  See comment % Final    eAG 10/25/2018 125.5  mg/dL Final    WBC 10/25/2018 5.2  4.0 - 11.0 K/uL Final    RBC 10/25/2018 4.87  4.00 - 5.20 M/uL Final    Hemoglobin 10/25/2018 14.7  12.0 - 16.0 g/dL Final    Hematocrit 10/25/2018 43.7  36.0 - 48.0 % Final    MCV 10/25/2018 89.7  80.0 - 100.0 fL Final    MCH 10/25/2018 30.2  26.0 - 34.0 pg Final    MCHC 10/25/2018 33.7  31.0 - 36.0 g/dL Final    RDW 10/25/2018 15.6* 12.4 - 15.4 % Final    Platelets 38/56/7843 162  135 - 450 K/uL Final    MPV 10/25/2018 10.7* 5.0 - 10.5 fL Final    Neutrophils % 10/25/2018 41.9  % Final    Lymphocytes % 10/25/2018 48.8  % Final    Monocytes % 10/25/2018 7.4  % Final    Eosinophils % 10/25/2018 1.3  % Final    Basophils % 10/25/2018 0.6  % Final    Neutrophils # 10/25/2018 2.2  1.7 - 7.7 K/uL Final    Lymphocytes # 10/25/2018 2.6  1.0 - 5.1 K/uL Final    Monocytes # 10/25/2018 0.4  0.0 - 1.3 K/uL Final    Eosinophils # 10/25/2018 0.1  0.0 - 0.6 K/uL Final    Basophils # 10/25/2018 0.0  0.0 - 0.2 K/uL Final    Microalbumin, Random Urine 10/25/2018 <1.20  <2.0 mg/dL Final    Creatinine, Ur 10/25/2018 23.3* 28.0 - 259.0 mg/dL Final    Microalbumin Creatinine Ratio 10/25/2018 see below  0.0 - 30.0 mg/g Final    Vitamin B-12 10/25/2018 891  211 - 911 pg/mL Final    Vit D, 25-Hydroxy 10/25/2018 37.0  >=30 ng/mL Final         Assessment and Plan:     Hyperglycemia  Reviewed home sugars--and all very good--will do A1c    Elevated blood pressure reading without diagnosis of hypertension  Home numbers good      --discussed smokes

## 2019-04-25 NOTE — CARE COORDINATION
health problems impacting on their mental well-being?:  No identified areas of concern   Are there any problems with your patients lifestyle behaviors (alcohol, drugs, diet, exercise) that are impacting on physical or mental well-being?:  No identified areas of concern   Do you have any other concerns about your patients mental well-being? How would you rate their severity and impact on the patient?:  No identified areas of concern   How would you rate their home environment in terms of safety and stability (including domestic violence, insecure housing, neighbor harassment)?:  Consistently safe, supportive, stable, no identified problems   How do daily activities impact on the patient's well-being? (include current or anticipated unemployment, work, caregiving, access to transportation or other):  No identified problems or perceived positive benefits   How would you rate their social network (family, work, friends)?:  Good participation with social networks   How would you rate their financial resources (including ability to afford all required medical care)?:  Financially secure, resources adequate, no identified problems   How wells does the patient now understand their health and well-being (symptoms, signs or risk factors) and what they need to do to manage their health?:  Reasonable to good understanding and already engages in managing health or is willing to undertake better management   How well do you think your patient can engage in healthcare discussions? (Barriers include language, deafness, aphasia, alcohol or drug problems, learning difficulties, concentration):  Clear and open communication, no identified barriers   Do other services need to be involved to help this patient?:  Other care/services not required at this time   Are current services involved with this patient well-coordinated? (Include coordination with other services you are now recommendation):   All required care/services in place and well-coordinated   Suggested Interventions and Community Resources  Fall Risk Prevention:  Completed Other Services or Interventions:  review of pt centered goals established   Registered Dietician:  Completed   Smoking Cessation: In Process   Zone Management Tools:  Completed         Schedule an appointment with the patient's PCP, Set up/Review an Education Plan, Set up/Review Goals              Prior to Admission medications    Medication Sig Start Date End Date Taking? Authorizing Provider   glucose monitoring kit (FREESTYLE) monitoring kit 1 kit by Does not apply route 2 times daily Dx: E11.65 1/31/19   Bong Franz MD   blood glucose test strips (FREESTYLE LITE) strip USE TO CHECK GLUCOSE TWICE DAILY E11.65 diabetes mellitus type 2 uncontrolled 1/31/19   Bong Franz MD   FREESTYLE LANCETS MISC 1 each by Does not apply route 2 times daily Dx: E11.65 1/31/19   Bong Franz MD   albuterol sulfate HFA (VENTOLIN HFA) 108 (90 Base) MCG/ACT inhaler Inhale 2 puffs into the lungs every 6 hours as needed for Wheezing 3/19/18   Bong Franz MD   Lift Chair MISC by Does not apply route 2/26/18   Bong Franz MD   tiZANidine (ZANAFLEX) 4 MG tablet Take 1 tablet by mouth 3 times daily 12/18/17   Bong Franz MD   acetaminophen (TYLENOL) 325 MG tablet Take 650 mg by mouth every 6 hours as needed for Pain    Historical Provider, MD   vitamin B-12 (CYANOCOBALAMIN) 1000 MCG tablet Take 1,000 mcg by mouth daily    Alysha Hunt MD   Calcium-Magnesium-Vitamin D (CALCIUM 500 PO) Take by mouth    Historical Provider, MD   vitamin D-3 (CHOLECALCIFEROL) 5000 UNITS TABS Take 5,000 Units by mouth daily. Alysha Hunt MD       No future appointments.

## 2019-04-26 LAB
ESTIMATED AVERAGE GLUCOSE: 131.2 MG/DL
HBA1C MFR BLD: 6.2 %

## 2019-06-10 ENCOUNTER — NURSE TRIAGE (OUTPATIENT)
Dept: OTHER | Facility: CLINIC | Age: 69
End: 2019-06-10

## 2019-06-10 NOTE — TELEPHONE ENCOUNTER
Reason for Disposition   MILD OR MODERATE joint swelling (e.g., feels or looks mildy swollen or puffy)    Protocols used: ELB[facility]    Spoke with patient directly pt regarding swelling in left elbow after hitting it on her counter top. She denies pain and redness at this time. But states that the elbow feels hot to touch. States that she can still move her elbow without pain. Describes the swelling as moderate. Recommended to see PCP within 3 days. Soft transfer to [facility] for available appointments. Care advice as documented. Pt agreeable.

## 2019-06-11 ENCOUNTER — OFFICE VISIT (OUTPATIENT)
Dept: FAMILY MEDICINE CLINIC | Age: 69
End: 2019-06-11
Payer: MEDICARE

## 2019-06-11 VITALS
WEIGHT: 172.6 LBS | SYSTOLIC BLOOD PRESSURE: 150 MMHG | HEART RATE: 75 BPM | DIASTOLIC BLOOD PRESSURE: 84 MMHG | OXYGEN SATURATION: 100 % | BODY MASS INDEX: 29.63 KG/M2 | TEMPERATURE: 98.2 F

## 2019-06-11 DIAGNOSIS — M70.22 OLECRANON BURSITIS OF LEFT ELBOW: Primary | ICD-10-CM

## 2019-06-11 PROCEDURE — 4004F PT TOBACCO SCREEN RCVD TLK: CPT | Performed by: NURSE PRACTITIONER

## 2019-06-11 PROCEDURE — 3017F COLORECTAL CA SCREEN DOC REV: CPT | Performed by: NURSE PRACTITIONER

## 2019-06-11 PROCEDURE — 1123F ACP DISCUSS/DSCN MKR DOCD: CPT | Performed by: NURSE PRACTITIONER

## 2019-06-11 PROCEDURE — 4040F PNEUMOC VAC/ADMIN/RCVD: CPT | Performed by: NURSE PRACTITIONER

## 2019-06-11 PROCEDURE — G8417 CALC BMI ABV UP PARAM F/U: HCPCS | Performed by: NURSE PRACTITIONER

## 2019-06-11 PROCEDURE — G8399 PT W/DXA RESULTS DOCUMENT: HCPCS | Performed by: NURSE PRACTITIONER

## 2019-06-11 PROCEDURE — 1090F PRES/ABSN URINE INCON ASSESS: CPT | Performed by: NURSE PRACTITIONER

## 2019-06-11 PROCEDURE — 99213 OFFICE O/P EST LOW 20 MIN: CPT | Performed by: NURSE PRACTITIONER

## 2019-06-11 PROCEDURE — G8427 DOCREV CUR MEDS BY ELIG CLIN: HCPCS | Performed by: NURSE PRACTITIONER

## 2019-06-11 RX ORDER — MELOXICAM 7.5 MG/1
7.5 TABLET ORAL DAILY
Qty: 90 TABLET | Refills: 1 | Status: SHIPPED | OUTPATIENT
Start: 2019-06-11 | End: 2021-04-15

## 2019-06-11 NOTE — PATIENT INSTRUCTIONS
Patient Education        Bursitis of the Elbow: Care Instructions  Your Care Instructions  Bursitis is pain and swelling of the bursae. These are sacs of fluid that help your joints move smoothly. Olecranon bursitis is a type of bursitis that affects the back of the elbow. This is sometimes called Alvin elbow because the bump that develops looks like the cartoon character Alvin's elbow. Injury, overuse, or prolonged pressure on your elbow can cause this form of bursitis. Sometimes it happens when people have arthritis. It also can occur for unknown reasons. Treatment may include draining fluid from the bursa with a needle. If your doctor thought there was infection, he or she may have prescribed antibiotics. You also may get shots of medicine into the bursa to help the swelling go down. Your elbow should get better in a few days or weeks. Follow-up care is a key part of your treatment and safety. Be sure to make and go to all appointments, and call your doctor if you are having problems. It's also a good idea to know your test results and keep a list of the medicines you take. How can you care for yourself at home? · Take pain medicines exactly as directed. ? If the doctor gave you a prescription medicine for pain, take it as prescribed. ? If you are not taking a prescription pain medicine, ask your doctor if you can take an over-the-counter medicine. ? Do not take two or more pain medicines at the same time unless the doctor told you to. Many pain medicines have acetaminophen, which is Tylenol. Too much acetaminophen (Tylenol) can be harmful. · If your doctor prescribed antibiotics, take them as directed. Do not stop taking them just because you feel better. You need to take the full course of antibiotics. · If your doctor gave you a sling, an elastic bandage, or a compression sleeve, wear it exactly as instructed. · Put ice or a cold pack on your elbow for 10 to 20 minutes at a time.  Try to do this every 1 to 2 hours for the next 3 days (when you are awake) or until the swelling goes down. Put a thin cloth between the ice and your skin. · After 3 days, you can try heat, or alternate heat and ice. · Rest your elbow. Try to stop or reduce any activity that causes pain. · Wear elbow pads during physical activity to prevent injury. · Do not lean your elbows on tables or armrests. When should you call for help? Call your doctor now or seek immediate medical care if:    · You have new or worse symptoms of infection, such as:  ? Increased pain, swelling, warmth, or redness. ? Red streaks leading from the area. ? Pus draining from the area. ? A fever.    Watch closely for changes in your health, and be sure to contact your doctor if:    · You do not get better as expected. Where can you learn more? Go to [email or web address] and sign in to your [employer] account. Enter  in the medineering box to learn more about \"Bursitis of the Elbow: Care Instructions. \"     If you do not have an account, please click on the \"Sign Up Now\" link. Current as of: September 20, 2018  Content Version: 12.0  © 2242-1355 [employer]wise, Inc[employer]. Care instructions adapted under license by [facility] ([facility]). If you have questions about a medical condition or this instruction, always ask your healthcare professional. [place] [address] any warranty or liability for your use of this information.

## 2019-06-11 NOTE — PROGRESS NOTES
 blood glucose test strips (FREESTYLE LITE) strip USE TO CHECK GLUCOSE TWICE DAILY E11.65 diabetes mellitus type 2 uncontrolled 100 strip 5    FREESTYLE LANCETS MISC 1 each by Does not apply route 2 times daily Dx: E11.65 100 each 3    acetaminophen (TYLENOL) 325 MG tablet Take 650 mg by mouth every 6 hours as needed for Pain      vitamin B-12 (CYANOCOBALAMIN) 1000 MCG tablet Take 1,000 mcg by mouth daily      Calcium-Magnesium-Vitamin D (CALCIUM 500 PO) Take by mouth      vitamin D-3 (CHOLECALCIFEROL) 5000 UNITS TABS Take 5,000 Units by mouth daily. No current facility-administered medications for this visit. Objective:     Vitals:    06/11/19 0940   BP: (!) 150/84   Pulse:    Temp:    SpO2:        Physical Exam   Musculoskeletal:        Left elbow: She exhibits swelling and effusion. Assessment & Plan:   1. Olecranon bursitis of left elbow  - meloxicam (MOBIC) 7.5 MG tablet; Take 1 tablet by mouth daily  Dispense: 90 tablet; Refill: 1  - Conservative care for now with instructions provided.  Watch for s/s infection  Call office for for follow up for any worsening of condition or failure to improve    Health Maintenance   Topic Date Due    Diabetic retinal exam  07/23/1960    Shingles Vaccine (2 of 3) 11/27/2012    Diabetic foot exam  10/25/2019    Diabetic microalbuminuria test  10/25/2019    Lipid screen  10/25/2019    Breast cancer screen  02/20/2020    A1C test (Diabetic or Prediabetic)  04/25/2020    DTaP/Tdap/Td vaccine (2 - Td) 03/12/2023    Colon cancer screen colonoscopy  02/13/2027    DEXA (modify frequency per FRAX score)  Completed    Flu vaccine  Completed    Pneumococcal 65+ years Vaccine  Completed    Hepatitis C screen  Completed       HELLEN Miranda - CNP

## 2019-09-11 ENCOUNTER — OFFICE VISIT (OUTPATIENT)
Dept: FAMILY MEDICINE CLINIC | Age: 69
End: 2019-09-11
Payer: MEDICARE

## 2019-09-11 VITALS
DIASTOLIC BLOOD PRESSURE: 80 MMHG | SYSTOLIC BLOOD PRESSURE: 120 MMHG | OXYGEN SATURATION: 99 % | HEIGHT: 64 IN | WEIGHT: 163.6 LBS | BODY MASS INDEX: 27.93 KG/M2 | HEART RATE: 93 BPM

## 2019-09-11 DIAGNOSIS — E11.65 UNCONTROLLED TYPE 2 DIABETES MELLITUS WITH HYPERGLYCEMIA (HCC): ICD-10-CM

## 2019-09-11 DIAGNOSIS — Z00.00 WELL ADULT EXAM: Primary | ICD-10-CM

## 2019-09-11 DIAGNOSIS — Z00.00 WELL ADULT EXAM: ICD-10-CM

## 2019-09-11 LAB
A/G RATIO: 1.5 (ref 1.1–2.2)
ALBUMIN SERPL-MCNC: 4.3 G/DL (ref 3.4–5)
ALP BLD-CCNC: 53 U/L (ref 40–129)
ALT SERPL-CCNC: <5 U/L (ref 10–40)
ANION GAP SERPL CALCULATED.3IONS-SCNC: 12 MMOL/L (ref 3–16)
AST SERPL-CCNC: 13 U/L (ref 15–37)
BASOPHILS ABSOLUTE: 0 K/UL (ref 0–0.2)
BASOPHILS RELATIVE PERCENT: 0.5 %
BILIRUB SERPL-MCNC: 0.3 MG/DL (ref 0–1)
BUN BLDV-MCNC: 14 MG/DL (ref 7–20)
CALCIUM SERPL-MCNC: 9.5 MG/DL (ref 8.3–10.6)
CHLORIDE BLD-SCNC: 104 MMOL/L (ref 99–110)
CHOLESTEROL, TOTAL: 184 MG/DL (ref 0–199)
CO2: 26 MMOL/L (ref 21–32)
CREAT SERPL-MCNC: 0.8 MG/DL (ref 0.6–1.2)
EOSINOPHILS ABSOLUTE: 0 K/UL (ref 0–0.6)
EOSINOPHILS RELATIVE PERCENT: 1.1 %
GFR AFRICAN AMERICAN: >60
GFR NON-AFRICAN AMERICAN: >60
GLOBULIN: 2.8 G/DL
GLUCOSE BLD-MCNC: 95 MG/DL (ref 70–99)
HCT VFR BLD CALC: 43.7 % (ref 36–48)
HDLC SERPL-MCNC: 44 MG/DL (ref 40–60)
HEMOGLOBIN: 14.7 G/DL (ref 12–16)
LDL CHOLESTEROL CALCULATED: 122 MG/DL
LYMPHOCYTES ABSOLUTE: 2.4 K/UL (ref 1–5.1)
LYMPHOCYTES RELATIVE PERCENT: 55.3 %
MCH RBC QN AUTO: 30.6 PG (ref 26–34)
MCHC RBC AUTO-ENTMCNC: 33.7 G/DL (ref 31–36)
MCV RBC AUTO: 90.7 FL (ref 80–100)
MONOCYTES ABSOLUTE: 0.3 K/UL (ref 0–1.3)
MONOCYTES RELATIVE PERCENT: 7.9 %
NEUTROPHILS ABSOLUTE: 1.5 K/UL (ref 1.7–7.7)
NEUTROPHILS RELATIVE PERCENT: 35.2 %
PDW BLD-RTO: 15.1 % (ref 12.4–15.4)
PLATELET # BLD: 162 K/UL (ref 135–450)
PMV BLD AUTO: 10.3 FL (ref 5–10.5)
POTASSIUM SERPL-SCNC: 4.5 MMOL/L (ref 3.5–5.1)
RBC # BLD: 4.82 M/UL (ref 4–5.2)
SODIUM BLD-SCNC: 142 MMOL/L (ref 136–145)
TOTAL PROTEIN: 7.1 G/DL (ref 6.4–8.2)
TRIGL SERPL-MCNC: 91 MG/DL (ref 0–150)
VLDLC SERPL CALC-MCNC: 18 MG/DL
WBC # BLD: 4.3 K/UL (ref 4–11)

## 2019-09-11 PROCEDURE — 1123F ACP DISCUSS/DSCN MKR DOCD: CPT | Performed by: FAMILY MEDICINE

## 2019-09-11 PROCEDURE — G0439 PPPS, SUBSEQ VISIT: HCPCS | Performed by: FAMILY MEDICINE

## 2019-09-11 PROCEDURE — 4040F PNEUMOC VAC/ADMIN/RCVD: CPT | Performed by: FAMILY MEDICINE

## 2019-09-11 PROCEDURE — 3044F HG A1C LEVEL LT 7.0%: CPT | Performed by: FAMILY MEDICINE

## 2019-09-11 PROCEDURE — 3017F COLORECTAL CA SCREEN DOC REV: CPT | Performed by: FAMILY MEDICINE

## 2019-09-11 NOTE — PROGRESS NOTES
Arthritis     Bile induced gastritis     Chest pain     VENECIA--neg lexiscan    Chronic back pain     Colorectal polyps     Diabetes mellitus type 2 in obese ([address] 2018    Diverticulosis     GERD (gastroesophageal reflux disease)     Helicobacter pylori gastritis 2012    Postmenopausal bleeding 2010    Pregnancy     2 children - vaginal deliveries    Spinal stenosis      Past Surgical History:   Procedure Laterality Date    CARPAL TUNNEL RELEASE  2006    CHOLECYSTECTOMY  1996    COLONOSCOPY  8138,5112    q5y    MENISCECTOMY  2008    L knee    TUBAL LIGATION  1985     Family History   Problem Relation Age of Onset    Diabetes Mother     Hypertension Mother    Marylou Calle Diabetes Father     Hypertension Father     Diabetes Sister     Hypertension Sister    Aviva Pel Sister          at 40    Cancer Other         breast/colon cancer    Diabetes Sister     Thyroid Disease Sister     Other Sister         dementia    Other Brother 79        abdominal problems     Social History     Socioeconomic History    Marital status: Single     Spouse name: Not on file    Number of children: 2    Years of education: Not on file    Highest education level: Not on file   Occupational History    Occupation: [employer]   Social Needs    Financial resource strain: Not on file    Food insecurity:     Worry: Not on file     Inability: Not on file    Transportation needs:     Medical: Not on file     Non-medical: Not on file   Tobacco Use    Smoking status: Current Some Day Smoker     Packs/day: 0.10     Years: 25.00     Pack years: 2.50     Types: Cigars    Smokeless tobacco: Never Used    Tobacco comment: 19 precontemplation stage, smokes cigars only   Substance and Sexual Activity    Alcohol use: Not Currently     Alcohol/week: 1.0 standard drinks     Types: 1 Cans of beer per week    Drug use: Not on file    Sexual activity: Not Currently     Partners: Male   Lifestyle    Cholesterol Screen  Lab Results   Component Value Date    CHOL 187 03/19/2018    TRIG 98 03/19/2018    HDL 41 10/25/2018    [facility] 97 10/25/2018    Screening covered every 5 years   Test recommended and ordered   Aspirin for Cardiovascular Prevention   No Not indicated    Recommended Immunizations    Immunization History   Administered Date(s) Administered    Influenza 09/28/2011, 10/08/2013    Influenza Virus Vaccine 10/16/2014    Influenza, High Dose (Fluzone 65 yrs and older) 09/17/2015, 10/12/2018    Pneumococcal Conjugate 13-valent ([id number]) 09/17/2015    Pneumococcal Polysaccharide (Jyrppzipb03) 09/28/2011, 04/02/2018    Tdap (Boostrix, Adacel) 03/12/2013    Tetanus 08/07/2008    Zoster Live (Zostavax) 10/02/2012    Influenza vaccine: recommended every fall  Pneumonia vaccine: previously administered after age 72- no need to repeat  Shingles vaccine: not available  Tetanus vaccine: tetanus and diptheria vaccine (Td) recommended every 10 years- due 2026         Risk Factors and Conditions Identified During Visit  Risks Identified Treatment options   Behavioral Risks  · None identified   · No treatment is necessary   Psychosocial Risks  · None identified   · No treatment is necessary    Functional/Safety Risks  · None identified   · No treatment is necessary     Other Recommendations/Plans ·   Vaccination recommendations: vaccination to help prevent shingles was provided today- will look at pharmacy          Uncontrolled type 2 diabetes mellitus with hyperglycemia ([facility]  Will recheck A1c-renal(home A1c is 5.3)--has lost more wgt--doing well    Well adult exam  labs

## 2019-09-11 NOTE — PROGRESS NOTES
Patient Self-Management Goal for Chronic Condition  Goal: I will check my blood sugar at least once per day, in the morning before breakfast, and bring these readings to my next visit. Barriers to success: none   Plan for overcoming my barriers: continue to check.  Checks more because her numbers fluctuate     Confidence: 9/10  Date goal set: 9/11/19  Date goal attained:

## 2019-09-12 LAB
ESTIMATED AVERAGE GLUCOSE: 128.4 MG/DL
HBA1C MFR BLD: 6.1 %

## 2020-01-02 ENCOUNTER — OFFICE VISIT (OUTPATIENT)
Dept: FAMILY MEDICINE CLINIC | Age: 70
End: 2020-01-02
Payer: MEDICARE

## 2020-01-02 VITALS
TEMPERATURE: 99 F | DIASTOLIC BLOOD PRESSURE: 80 MMHG | RESPIRATION RATE: 16 BRPM | OXYGEN SATURATION: 98 % | SYSTOLIC BLOOD PRESSURE: 120 MMHG | HEART RATE: 82 BPM

## 2020-01-02 PROCEDURE — G8484 FLU IMMUNIZE NO ADMIN: HCPCS | Performed by: NURSE PRACTITIONER

## 2020-01-02 PROCEDURE — 1090F PRES/ABSN URINE INCON ASSESS: CPT | Performed by: NURSE PRACTITIONER

## 2020-01-02 PROCEDURE — G8428 CUR MEDS NOT DOCUMENT: HCPCS | Performed by: NURSE PRACTITIONER

## 2020-01-02 PROCEDURE — 1123F ACP DISCUSS/DSCN MKR DOCD: CPT | Performed by: NURSE PRACTITIONER

## 2020-01-02 PROCEDURE — 4040F PNEUMOC VAC/ADMIN/RCVD: CPT | Performed by: NURSE PRACTITIONER

## 2020-01-02 PROCEDURE — 4004F PT TOBACCO SCREEN RCVD TLK: CPT | Performed by: NURSE PRACTITIONER

## 2020-01-02 PROCEDURE — G8399 PT W/DXA RESULTS DOCUMENT: HCPCS | Performed by: NURSE PRACTITIONER

## 2020-01-02 PROCEDURE — 3017F COLORECTAL CA SCREEN DOC REV: CPT | Performed by: NURSE PRACTITIONER

## 2020-01-02 PROCEDURE — 99213 OFFICE O/P EST LOW 20 MIN: CPT | Performed by: NURSE PRACTITIONER

## 2020-01-02 PROCEDURE — G8417 CALC BMI ABV UP PARAM F/U: HCPCS | Performed by: NURSE PRACTITIONER

## 2020-01-02 ASSESSMENT — ENCOUNTER SYMPTOMS
VOICE CHANGE: 1
RHINORRHEA: 1
GASTROINTESTINAL NEGATIVE: 1
COUGH: 1

## 2020-01-02 NOTE — PROGRESS NOTES
Diabetic microalbuminuria test  10/25/2019    Breast cancer screen  02/20/2020    Annual Wellness Visit (AWV)  09/10/2020    A1C test (Diabetic or Prediabetic)  09/11/2020    Lipid screen  09/11/2020    DTaP/Tdap/Td vaccine (2 - Td) 03/12/2023    Colon cancer screen colonoscopy  02/13/2027    DEXA (modify frequency per FRAX score)  Completed    Pneumococcal 65+ years Vaccine  Completed    Hepatitis C screen  Completed        Diagnosis Orders   1. Viral URI         Viral URI  Symptoms are viral in presentation. Instructed patient on over-the-counter symptom relief. To call office for any worsening or change in symptoms over the next 3 to 5 days.   Her symptoms should be completely resolved within about 14 days from onset      Call office for for follow up for any worsening of condition or failure to improve    Shreyas Martinez, HELLEN - CNP

## 2020-01-13 ENCOUNTER — NURSE TRIAGE (OUTPATIENT)
Dept: OTHER | Facility: CLINIC | Age: 70
End: 2020-01-13

## 2020-01-13 NOTE — TELEPHONE ENCOUNTER
Call received from Alma Neil low blood pressure - went to [facility] earlier today (11:00am) to get her shingles vaccine and did not feel well so she checked her BP and it was 90/57. Has been checking BP at home and getting error messages when taking. Attempted to get reading twice while on call but unable to get a good reading. Pt also states she feels overwhelmingly tired. Has been taking Delsym cough medication prn - last dose was Saturday. She denies CP, SOB, lightheadedness or dizziness. No bleeding or fever. She is not taking BP medication per her report. Reason for Disposition   Systolic BP < 90 and NOT dizzy, lightheaded or weak    Protocols used: LOW BLOOD PRESSURE-ADULT-OH    Caller reports symptoms as documented above. Caller informed of disposition and states she is unable to get to the office today but will have her daughter take her to [address] later this afternoon   Care advice as documented. Please do not respond to the triage nurse through this encounter. Any subsequent communication should be directly with the patient.

## 2020-01-31 ENCOUNTER — TELEPHONE (OUTPATIENT)
Dept: FAMILY MEDICINE CLINIC | Age: 70
End: 2020-01-31

## 2020-02-03 ENCOUNTER — TELEPHONE (OUTPATIENT)
Dept: FAMILY MEDICINE CLINIC | Age: 70
End: 2020-02-03

## 2020-10-28 ENCOUNTER — OFFICE VISIT (OUTPATIENT)
Dept: FAMILY MEDICINE CLINIC | Age: 70
End: 2020-10-28
Payer: MEDICARE

## 2020-10-28 VITALS
DIASTOLIC BLOOD PRESSURE: 80 MMHG | BODY MASS INDEX: 27.11 KG/M2 | WEIGHT: 158.8 LBS | SYSTOLIC BLOOD PRESSURE: 130 MMHG | HEART RATE: 95 BPM | OXYGEN SATURATION: 97 % | HEIGHT: 64 IN

## 2020-10-28 PROBLEM — R42 DIZZINESS: Status: ACTIVE | Noted: 2020-10-28

## 2020-10-28 PROBLEM — E11.65 UNCONTROLLED TYPE 2 DIABETES MELLITUS WITH HYPERGLYCEMIA (HCC): Status: RESOLVED | Noted: 2018-04-25 | Resolved: 2020-10-28

## 2020-10-28 PROBLEM — E01.0 THYROMEGALY: Status: ACTIVE | Noted: 2020-10-28

## 2020-10-28 PROBLEM — E11.9 TYPE 2 DIABETES MELLITUS WITHOUT COMPLICATION, WITHOUT LONG-TERM CURRENT USE OF INSULIN (HCC): Status: ACTIVE | Noted: 2018-10-25

## 2020-10-28 PROCEDURE — 1123F ACP DISCUSS/DSCN MKR DOCD: CPT | Performed by: FAMILY MEDICINE

## 2020-10-28 PROCEDURE — G8510 SCR DEP NEG, NO PLAN REQD: HCPCS | Performed by: FAMILY MEDICINE

## 2020-10-28 PROCEDURE — G8399 PT W/DXA RESULTS DOCUMENT: HCPCS | Performed by: FAMILY MEDICINE

## 2020-10-28 PROCEDURE — 99214 OFFICE O/P EST MOD 30 MIN: CPT | Performed by: FAMILY MEDICINE

## 2020-10-28 PROCEDURE — 4040F PNEUMOC VAC/ADMIN/RCVD: CPT | Performed by: FAMILY MEDICINE

## 2020-10-28 PROCEDURE — G8427 DOCREV CUR MEDS BY ELIG CLIN: HCPCS | Performed by: FAMILY MEDICINE

## 2020-10-28 PROCEDURE — 3017F COLORECTAL CA SCREEN DOC REV: CPT | Performed by: FAMILY MEDICINE

## 2020-10-28 PROCEDURE — 4004F PT TOBACCO SCREEN RCVD TLK: CPT | Performed by: FAMILY MEDICINE

## 2020-10-28 PROCEDURE — G8482 FLU IMMUNIZE ORDER/ADMIN: HCPCS | Performed by: FAMILY MEDICINE

## 2020-10-28 PROCEDURE — 3046F HEMOGLOBIN A1C LEVEL >9.0%: CPT | Performed by: FAMILY MEDICINE

## 2020-10-28 PROCEDURE — 1090F PRES/ABSN URINE INCON ASSESS: CPT | Performed by: FAMILY MEDICINE

## 2020-10-28 PROCEDURE — 3288F FALL RISK ASSESSMENT DOCD: CPT | Performed by: FAMILY MEDICINE

## 2020-10-28 PROCEDURE — G8417 CALC BMI ABV UP PARAM F/U: HCPCS | Performed by: FAMILY MEDICINE

## 2020-10-28 PROCEDURE — 2022F DILAT RTA XM EVC RTNOPTHY: CPT | Performed by: FAMILY MEDICINE

## 2020-10-28 RX ORDER — BLOOD-GLUCOSE METER
KIT MISCELLANEOUS
Qty: 100 EACH | Refills: 5 | Status: SHIPPED | OUTPATIENT
Start: 2020-10-28 | End: 2021-09-17

## 2020-10-28 SDOH — ECONOMIC STABILITY: FOOD INSECURITY: WITHIN THE PAST 12 MONTHS, YOU WORRIED THAT YOUR FOOD WOULD RUN OUT BEFORE YOU GOT MONEY TO BUY MORE.: NEVER TRUE

## 2020-10-28 SDOH — ECONOMIC STABILITY: FOOD INSECURITY: WITHIN THE PAST 12 MONTHS, THE FOOD YOU BOUGHT JUST DIDN'T LAST AND YOU DIDN'T HAVE MONEY TO GET MORE.: NEVER TRUE

## 2020-10-28 SDOH — ECONOMIC STABILITY: TRANSPORTATION INSECURITY
IN THE PAST 12 MONTHS, HAS THE LACK OF TRANSPORTATION KEPT YOU FROM MEDICAL APPOINTMENTS OR FROM GETTING MEDICATIONS?: NO

## 2020-10-28 SDOH — ECONOMIC STABILITY: TRANSPORTATION INSECURITY
IN THE PAST 12 MONTHS, HAS LACK OF TRANSPORTATION KEPT YOU FROM MEETINGS, WORK, OR FROM GETTING THINGS NEEDED FOR DAILY LIVING?: NO

## 2020-10-28 SDOH — ECONOMIC STABILITY: INCOME INSECURITY: HOW HARD IS IT FOR YOU TO PAY FOR THE VERY BASICS LIKE FOOD, HOUSING, MEDICAL CARE, AND HEATING?: NOT HARD AT ALL

## 2020-10-28 ASSESSMENT — ENCOUNTER SYMPTOMS
VISUAL CHANGE: 0
SORE THROAT: 0
NAUSEA: 0
ABDOMINAL PAIN: 0
SWOLLEN GLANDS: 0
VOMITING: 0
CHANGE IN BOWEL HABIT: 0
COUGH: 0

## 2020-10-28 ASSESSMENT — PATIENT HEALTH QUESTIONNAIRE - PHQ9
1. LITTLE INTEREST OR PLEASURE IN DOING THINGS: 0
SUM OF ALL RESPONSES TO PHQ QUESTIONS 1-9: 0
SUM OF ALL RESPONSES TO PHQ QUESTIONS 1-9: 0
2. FEELING DOWN, DEPRESSED OR HOPELESS: 0
SUM OF ALL RESPONSES TO PHQ QUESTIONS 1-9: 0
SUM OF ALL RESPONSES TO PHQ9 QUESTIONS 1 & 2: 0

## 2020-10-28 NOTE — PROGRESS NOTES
Subjective:     Patient ID:Marychuy Groves is a 79 y.o. female. Dizziness   This is a new problem. The current episode started 1 to 4 weeks ago. The problem occurs intermittently. The problem has been unchanged. Pertinent negatives include no abdominal pain, anorexia, arthralgias, change in bowel habit, chest pain, chills, congestion, coughing, diaphoresis, fatigue, fever, headaches, joint swelling, myalgias, nausea, neck pain, numbness, rash, sore throat, swollen glands, urinary symptoms, vertigo, visual change, vomiting or weakness. Nothing aggravates the symptoms. She has tried nothing for the symptoms. The treatment provided no relief. Allergies   Allergen Reactions    Glucosamine Chondroitin Joint [Glucos-Chondroit-Hyaluron-Msm] Itching     Use liquid form- had itching and metallic taste in mouth    Relafen [Nabumetone] Itching and Swelling       Current Outpatient Medications   Medication Sig Dispense Refill    blood glucose test strips (FREESTYLE LITE) strip USE TO CHECK GLUCOSE TWICE DAILY  E11.65 100 each 5    meloxicam (MOBIC) 7.5 MG tablet Take 1 tablet by mouth daily 90 tablet 1    glucose monitoring kit (FREESTYLE) monitoring kit 1 kit by Does not apply route 2 times daily Dx: E11.65 1 kit 0    FREESTYLE LANCETS MISC 1 each by Does not apply route 2 times daily Dx: E11.65 100 each 3    acetaminophen (TYLENOL) 325 MG tablet Take 650 mg by mouth every 6 hours as needed for Pain      vitamin B-12 (CYANOCOBALAMIN) 1000 MCG tablet Take 1,000 mcg by mouth daily      Calcium-Magnesium-Vitamin D (CALCIUM 500 PO) Take by mouth      vitamin D-3 (CHOLECALCIFEROL) 5000 UNITS TABS Take 5,000 Units by mouth daily. No current facility-administered medications for this visit.         Past Medical History:   Diagnosis Date    Arthritis     Bile induced gastritis     Chest pain 9/11    VENECIA--neg lexiscan    Chronic back pain     Colorectal polyps     Diabetes mellitus type 2 in obese ([address] activity: Not on file   Other Topics Concern    Not on file   Social History Narrative    Exercise rare    Lives by self    Single    Now retired    [address] and grandchildren       Family History   Problem Relation Age of Onset    Diabetes Mother     Hypertension Mother     Diabetes Father     Hypertension Father     Diabetes Sister     Hypertension Sister    Jovanny Jeniffer Sister          at 40    Cancer Other         breast/colon cancer    Diabetes Sister     Thyroid Disease Sister     Other Sister         dementia    Other Brother 79        abdominal problems/AAA? Immunization History   Administered Date(s) Administered    Influenza 2011, 10/08/2013    Influenza Virus Vaccine 10/16/2014    Influenza, High Dose (Fluzone 65 yrs and older) 2015, 10/12/2018    Pneumococcal Conjugate 13-valent (Sckpijs17) 2015    Pneumococcal Polysaccharide (Qrrwtkpqo33) 2011, 2018    Tdap (Boostrix, Adacel) 2013    Tetanus 2008    Zoster Live (Zostavax) 10/02/2012       Review of Systems  Review of Systems   Constitutional: Negative for chills, diaphoresis, fatigue and fever. HENT: Negative for congestion and sore throat. Respiratory: Negative for cough. Cardiovascular: Negative for chest pain. Gastrointestinal: Negative for abdominal pain, anorexia, change in bowel habit, nausea and vomiting. Musculoskeletal: Negative for arthralgias, joint swelling, myalgias and neck pain. Skin: Negative for rash. Neurological: Positive for dizziness. Negative for vertigo, weakness, numbness and headaches. Objective:   Physical Exam  Physical Exam  Vitals signs reviewed. Constitutional:       General: She is not in acute distress. Appearance: She is well-developed. Eyes:      Conjunctiva/sclera: Conjunctivae normal.      Pupils: Pupils are equal, round, and reactive to light. Neck:      Thyroid: No thyromegaly. Vascular: No JVD.       Trachea: No tracheal deviation. Comments: Thyroid enlarged--R>L  Cardiovascular:      Rate and Rhythm: Normal rate and regular rhythm. Heart sounds: Normal heart sounds. No murmur. No gallop. Pulmonary:      Effort: Pulmonary effort is normal. No respiratory distress. Breath sounds: Normal breath sounds. No stridor. No wheezing or rales. Chest:      Chest wall: No tenderness. Abdominal:      General: Bowel sounds are normal. There is no distension. Palpations: Abdomen is soft. There is no mass. Tenderness: There is no abdominal tenderness. Musculoskeletal:         General: No tenderness. Lymphadenopathy:      Cervical: No cervical adenopathy. Skin:     General: Skin is warm and dry. Coloration: Skin is not pale. Findings: No erythema or rash. Neurological:      Mental Status: She is alert and oriented to person, place, and time. Cranial Nerves: No cranial nerve deficit. Motor: No abnormal muscle tone. Coordination: Coordination normal.      Deep Tendon Reflexes: Reflexes normal.   Psychiatric:         Behavior: Behavior normal.         Thought Content:  Thought content normal.         Judgment: Judgment normal.         Assessment and Plan:     Thyromegaly  Will get labs and u/s    Type 2 diabetes mellitus without complication, without long-term current use of insulin ([facility])  labs    Smoker  Discussed--chest CT    Dizziness  Due to excessive caffeine

## 2020-10-29 DIAGNOSIS — E11.9 TYPE 2 DIABETES MELLITUS WITHOUT COMPLICATION, WITHOUT LONG-TERM CURRENT USE OF INSULIN (HCC): ICD-10-CM

## 2020-10-29 DIAGNOSIS — R42 DIZZINESS: ICD-10-CM

## 2020-10-29 DIAGNOSIS — E01.0 THYROMEGALY: ICD-10-CM

## 2020-10-29 LAB
A/G RATIO: 1.6 (ref 1.1–2.2)
ALBUMIN SERPL-MCNC: 4.4 G/DL (ref 3.4–5)
ALP BLD-CCNC: 61 U/L (ref 40–129)
ALT SERPL-CCNC: <5 U/L (ref 10–40)
ANION GAP SERPL CALCULATED.3IONS-SCNC: 11 MMOL/L (ref 3–16)
AST SERPL-CCNC: 13 U/L (ref 15–37)
BASOPHILS ABSOLUTE: 0 K/UL (ref 0–0.2)
BASOPHILS RELATIVE PERCENT: 0.8 %
BILIRUB SERPL-MCNC: <0.2 MG/DL (ref 0–1)
BUN BLDV-MCNC: 15 MG/DL (ref 7–20)
CALCIUM SERPL-MCNC: 9.3 MG/DL (ref 8.3–10.6)
CHLORIDE BLD-SCNC: 105 MMOL/L (ref 99–110)
CHOLESTEROL, TOTAL: 201 MG/DL (ref 0–199)
CO2: 26 MMOL/L (ref 21–32)
CREAT SERPL-MCNC: 0.8 MG/DL (ref 0.6–1.2)
CREATININE URINE: 17.8 MG/DL (ref 28–259)
EOSINOPHILS ABSOLUTE: 0.1 K/UL (ref 0–0.6)
EOSINOPHILS RELATIVE PERCENT: 1.3 %
GFR AFRICAN AMERICAN: >60
GFR NON-AFRICAN AMERICAN: >60
GLOBULIN: 2.7 G/DL
GLUCOSE BLD-MCNC: 94 MG/DL (ref 70–99)
HCT VFR BLD CALC: 46.6 % (ref 36–48)
HDLC SERPL-MCNC: 56 MG/DL (ref 40–60)
HEMOGLOBIN: 15.3 G/DL (ref 12–16)
LDL CHOLESTEROL CALCULATED: 131 MG/DL
LYMPHOCYTES ABSOLUTE: 2.6 K/UL (ref 1–5.1)
LYMPHOCYTES RELATIVE PERCENT: 55.4 %
MCH RBC QN AUTO: 30.2 PG (ref 26–34)
MCHC RBC AUTO-ENTMCNC: 32.8 G/DL (ref 31–36)
MCV RBC AUTO: 91.9 FL (ref 80–100)
MICROALBUMIN UR-MCNC: <1.2 MG/DL
MICROALBUMIN/CREAT UR-RTO: ABNORMAL MG/G (ref 0–30)
MONOCYTES ABSOLUTE: 0.4 K/UL (ref 0–1.3)
MONOCYTES RELATIVE PERCENT: 8.5 %
NEUTROPHILS ABSOLUTE: 1.6 K/UL (ref 1.7–7.7)
NEUTROPHILS RELATIVE PERCENT: 34 %
PDW BLD-RTO: 15 % (ref 12.4–15.4)
PLATELET # BLD: 163 K/UL (ref 135–450)
PMV BLD AUTO: 10.1 FL (ref 5–10.5)
POTASSIUM SERPL-SCNC: 4.4 MMOL/L (ref 3.5–5.1)
RBC # BLD: 5.07 M/UL (ref 4–5.2)
SODIUM BLD-SCNC: 142 MMOL/L (ref 136–145)
T4 FREE: 1.4 NG/DL (ref 0.9–1.8)
TOTAL PROTEIN: 7.1 G/DL (ref 6.4–8.2)
TRIGL SERPL-MCNC: 70 MG/DL (ref 0–150)
TSH SERPL DL<=0.05 MIU/L-ACNC: 1.19 UIU/ML (ref 0.27–4.2)
VLDLC SERPL CALC-MCNC: 14 MG/DL
WBC # BLD: 4.6 K/UL (ref 4–11)

## 2020-10-30 ENCOUNTER — HOSPITAL ENCOUNTER (OUTPATIENT)
Dept: CT IMAGING | Age: 70
Discharge: HOME | End: 2020-10-30
Payer: MEDICARE

## 2020-10-30 LAB
ESTIMATED AVERAGE GLUCOSE: 122.6 MG/DL
HBA1C MFR BLD: 5.9 %

## 2020-10-30 PROCEDURE — G0297 LDCT FOR LUNG CA SCREEN: HCPCS

## 2020-11-06 ENCOUNTER — TELEPHONE (OUTPATIENT)
Dept: CASE MANAGEMENT | Age: 70
End: 2020-11-06

## 2020-11-06 NOTE — TELEPHONE ENCOUNTER
Baseline lung screen on 10/30/2020. LRAD2. Recommended screen in one year. Reviewed by ordering physician and ordering office contacted pt with results. Results letter mailed.

## 2021-01-27 ENCOUNTER — HOSPITAL ENCOUNTER (OUTPATIENT)
Dept: MAMMOGRAPHY | Age: 71
Discharge: HOME | End: 2021-01-27
Payer: MEDICARE

## 2021-01-27 DIAGNOSIS — Z12.31 VISIT FOR SCREENING MAMMOGRAM: ICD-10-CM

## 2021-01-27 PROCEDURE — 77063 BREAST TOMOSYNTHESIS BI: CPT

## 2021-04-15 ENCOUNTER — OFFICE VISIT (OUTPATIENT)
Dept: ORTHOPEDIC SURGERY | Age: 71
End: 2021-04-15
Payer: MEDICARE

## 2021-04-15 VITALS
BODY MASS INDEX: 28.32 KG/M2 | WEIGHT: 170 LBS | HEIGHT: 65 IN | HEART RATE: 64 BPM | SYSTOLIC BLOOD PRESSURE: 140 MMHG | DIASTOLIC BLOOD PRESSURE: 78 MMHG

## 2021-04-15 DIAGNOSIS — M65.4 DE QUERVAIN'S TENOSYNOVITIS, LEFT: ICD-10-CM

## 2021-04-15 DIAGNOSIS — M25.532 LEFT WRIST PAIN: Primary | ICD-10-CM

## 2021-04-15 PROCEDURE — 3017F COLORECTAL CA SCREEN DOC REV: CPT | Performed by: ORTHOPAEDIC SURGERY

## 2021-04-15 PROCEDURE — G8399 PT W/DXA RESULTS DOCUMENT: HCPCS | Performed by: ORTHOPAEDIC SURGERY

## 2021-04-15 PROCEDURE — 1123F ACP DISCUSS/DSCN MKR DOCD: CPT | Performed by: ORTHOPAEDIC SURGERY

## 2021-04-15 PROCEDURE — 1090F PRES/ABSN URINE INCON ASSESS: CPT | Performed by: ORTHOPAEDIC SURGERY

## 2021-04-15 PROCEDURE — 4040F PNEUMOC VAC/ADMIN/RCVD: CPT | Performed by: ORTHOPAEDIC SURGERY

## 2021-04-15 PROCEDURE — 4004F PT TOBACCO SCREEN RCVD TLK: CPT | Performed by: ORTHOPAEDIC SURGERY

## 2021-04-15 PROCEDURE — 99203 OFFICE O/P NEW LOW 30 MIN: CPT | Performed by: ORTHOPAEDIC SURGERY

## 2021-04-15 PROCEDURE — G8427 DOCREV CUR MEDS BY ELIG CLIN: HCPCS | Performed by: ORTHOPAEDIC SURGERY

## 2021-04-15 PROCEDURE — L3908 WHO COCK-UP NONMOLDE PRE OTS: HCPCS | Performed by: ORTHOPAEDIC SURGERY

## 2021-04-15 PROCEDURE — G8417 CALC BMI ABV UP PARAM F/U: HCPCS | Performed by: ORTHOPAEDIC SURGERY

## 2021-04-18 NOTE — PROGRESS NOTES
Date:  2021    Name:  Pratik Barry  Address:  [address] [place] [address] [facility]    :  1950      Age:   79 y.o.    SSN:  [id number]      Medical Record Number:  [id number]    Reason for Visit:    Chief Complaint    Wrist Pain (Left wrist)      DOS:      HPI: Pratik Barry is a 79 y.o. female here today for left hand pain over the past 3-4 weeks with pain radiation down the hand from the wrist.   Increase in activity with onset of pain but no neurologic issues or issues of pain previously. No major trauma. No radicular or neurologic issues. Review of Systems:  Review of Systems   Constitutional: Negative for chills and fever. HENT: Negative for nosebleeds. Eyes: Negative for double vision. Cardiovascular: Negative for chest pain. Gastrointestinal: Negative for abdominal pain. Musculoskeletal: Positive for joint pain and myalgias. Skin: Negative for rash. Neurological: Negative for seizures. Psychiatric/Behavioral: Negative for hallucinations.         Past History:  Past Medical History:   Diagnosis Date    Arthritis     Bile induced gastritis     Chest pain     VENECIA--neg lexiscan    Chronic back pain     Colorectal polyps     Diabetes mellitus type 2 in obese ([address] 2018    Diverticulosis     GERD (gastroesophageal reflux disease)     Helicobacter pylori gastritis 2012    Postmenopausal bleeding 2010    Pregnancy     2 children - vaginal deliveries    Spinal stenosis     Thyromegaly 10/28/2020     Past Surgical History:   Procedure Laterality Date    CARPAL TUNNEL RELEASE  2006    CHOLECYSTECTOMY  1996    COLONOSCOPY  2016    q5y    MENISCECTOMY  2008    L knee    TUBAL LIGATION  1985     Current Outpatient Medications on File Prior to Visit   Medication Sig Dispense Refill    blood glucose test strips (FREESTYLE LITE) strip USE TO CHECK GLUCOSE TWICE DAILY  E11.65 100 each 5    glucose monitoring kit (FREESTYLE) monitoring kit 1 kit by Does not apply route 2 times daily Dx: E11.65 1 kit 0    FREESTYLE LANCETS MISC 1 each by Does not apply route 2 times daily Dx: E11.65 100 each 3    vitamin B-12 (CYANOCOBALAMIN) 1000 MCG tablet Take 1,000 mcg by mouth daily      Calcium-Magnesium-Vitamin D (CALCIUM 500 PO) Take by mouth      vitamin D-3 (CHOLECALCIFEROL) 5000 UNITS TABS Take 5,000 Units by mouth daily. No current facility-administered medications on file prior to visit.       Social History     Socioeconomic History    Marital status: Single     Spouse name: Not on file    Number of children: 2    Years of education: Not on file    Highest education level: Not on file   Occupational History    Occupation: [employer]   Social Needs    Financial resource strain: Not hard at all   Arpan-Aiyana insecurity     Worry: Never true     Inability: Never true    Transportation needs     Medical: No     Non-medical: No   Tobacco Use    Smoking status: Current Some Day Smoker     Packs/day: 0.10     Years: 25.00     Pack years: 2.50     Types: Cigars    Smokeless tobacco: Never Used    Tobacco comment: 4/25/19 precontemplation stage, smokes cigars only   Substance and Sexual Activity    Alcohol use: Not Currently     Alcohol/week: 1.0 standard drinks     Types: 1 Cans of beer per week    Drug use: Never    Sexual activity: Not Currently     Partners: Male   Lifestyle    Physical activity     Days per week: Not on file     Minutes per session: Not on file    Stress: Not on file   Relationships    Social connections     Talks on phone: Not on file     Gets together: Not on file     Attends Synagogue service: Not on file     Active member of club or organization: Not on file     Attends meetings of clubs or organizations: Not on file     Relationship status: Not on file    Intimate partner violence     Fear of current or ex partner: Not on file     Emotionally abused: Not on file     Physically abused: Not on file Forced sexual activity: Not on file   Other Topics Concern    Not on file   Social History Narrative    Exercise rare    Lives by self    Single    Now retired    Augie Badillo and grandchildren     Family History   Problem Relation Age of Onset    Diabetes Mother     Hypertension Mother     Diabetes Father     Hypertension Father     Diabetes Sister     Hypertension Sister    Evonne Dagsayra Sister          at 40    Cancer Other         breast/colon cancer    Diabetes Sister     Thyroid Disease Sister     Other Sister         dementia    Other Brother 79        abdominal problems/AAA? Current Medications:    Current Outpatient Medications   Medication Sig Dispense Refill    Diclofenac Sodium POWD 2 g by Does not apply route 4 times daily Formula #8E Baclo 2% Diclo 3% DMSO 5% Siddhartha 6% Lido 2% Prilo 2% 240 g 11    blood glucose test strips (FREESTYLE LITE) strip USE TO CHECK GLUCOSE TWICE DAILY  E11.65 100 each 5    glucose monitoring kit (FREESTYLE) monitoring kit 1 kit by Does not apply route 2 times daily Dx: E11.65 1 kit 0    FREESTYLE LANCETS MISC 1 each by Does not apply route 2 times daily Dx: E11.65 100 each 3    vitamin B-12 (CYANOCOBALAMIN) 1000 MCG tablet Take 1,000 mcg by mouth daily      Calcium-Magnesium-Vitamin D (CALCIUM 500 PO) Take by mouth      vitamin D-3 (CHOLECALCIFEROL) 5000 UNITS TABS Take 5,000 Units by mouth daily. No current facility-administered medications for this visit. Allergies: Allergies   Allergen Reactions    Glucosamine Chondroitin Joint [Glucos-Chondroit-Hyaluron-Msm] Itching     Use liquid form- had itching and metallic taste in mouth    Relafen [Nabumetone] Itching and Swelling       Physical Exam:  Vitals:    04/15/21 1205   BP: (!) 140/78   Pulse: 64       Physical Exam   Constitutional: Patient is oriented to person, place, and time and well-developed, well-nourished, and in no distress. HENT:   Head: Normocephalic and atraumatic.    Eyes: obvious areas of osteoarthritis mild amount may be present in the carpal bones and the base of the thumb. - XR WRIST LEFT (MIN 3 VIEWS); Future  - DJO Quick Fit Wrist and Thumb    2. De Quervain's tenosynovitis, left     - Diclofenac Sodium POWD; 2 g by Does not apply route 4 times daily Formula #8E Baclo 2% Diclo 3% DMSO 5% Siddhartha 6% Lido 2% Prilo 2%  Dispense: 240 g; Refill: 11  - Ambulatory referral to Physical Therapy  - [facility] Quick Fit Wrist and Thumb      Assessment: De Quervain's tenosynovitis left with acute presentation for several weeks.   This makes it possible to potentially decrease some of her motions add anti-inflammatories and diminish the chance of this going chronic or subacute    Plan: Wrist brace topical and oral anti-inflammatories were discussed limited activities although gentle range of motion was still encouraged    [unfilled]     Faisal Arn    Date:    4/18/2021

## 2021-04-20 ENCOUNTER — TELEPHONE (OUTPATIENT)
Dept: ORTHOPEDIC SURGERY | Age: 71
End: 2021-04-20

## 2021-04-20 DIAGNOSIS — M25.532 LEFT WRIST PAIN: Primary | ICD-10-CM

## 2021-04-20 DIAGNOSIS — M65.4 DE QUERVAIN'S TENOSYNOVITIS, LEFT: ICD-10-CM

## 2021-04-21 ENCOUNTER — HOSPITAL ENCOUNTER (OUTPATIENT)
Dept: OCCUPATIONAL THERAPY | Age: 71
Setting detail: THERAPIES SERIES
Discharge: HOME | End: 2021-04-21
Payer: MEDICARE

## 2021-04-21 PROCEDURE — 97165 OT EVAL LOW COMPLEX 30 MIN: CPT

## 2021-04-21 PROCEDURE — 97140 MANUAL THERAPY 1/> REGIONS: CPT

## 2021-04-21 PROCEDURE — 97110 THERAPEUTIC EXERCISES: CPT

## 2021-04-21 NOTE — PLAN OF CARE
[address], [address] [facility], [place] [address]  Phone: [phone]   Fax: [phone]                                                     Occupational Therapy Certification    Dear Dr. Jad Grace MD,    We had the pleasure of evaluating the following patient for occupational therapy services at [facility]. A summary of our findings can be found in the initial assessment below. This includes our plan of care. If you have any questions or concerns regarding these findings, please do not hesitate to contact me at the office phone number checked above. Thank you for the referral.       Referring Practitioner Signature:_______________________________Date:__________________  By signing above (or electronic signature), therapists plan is approved by the referring practicioner      Patient: Consuelo Ansari \"Marychuy\" or \"Cat\"  : 1950  MRN: [id number]  Referring Practitioner:   Dr. Jad Grace MD     Evaluation Date: 2021      Medical Diagnosis Information:      M25.532 (ICD-10-CM) - Left wrist pain  M65.4 (ICD-10-CM) - Katheren Dwayne Quervain's tenosynovitis, left                                              Insurance information:   Medic[place] and [employer]- [place], no auth    Precautions/ Contra-indications:  none noted on order  Latex Allergy:  [x]NO      []YES  Preferred Language for Healthcare:   [x]English       []Other:    C-SSRS Triggered by Intake questionnaire (Past 2 wk assessment ):   [x] No, Questionnaire did not trigger screening.   [] Yes, Patient intake triggered C-SSRS Screening     [] Completed, no further action required. [] Completed, PCP notified via [place]  Reason for Seeking OT Services and Patient Goals: Pt presented to MD with L wrist pain following increased activity after buying a new house and taking up the carpet.  Pain has lasted ~4 weeks; diagnosed with L wrist pain Digit PIP Extension-Flexion       3rd Digit PIP Extension-Flexion       2nd Digit PIP Extension-Flexion       1st Digit IP Extension-Flexion       5th Digit DIP Extension-Flexion       4th Digit DIP Extension-Flexion       3rd Digit DIP Extension-Flexion       2nd Digit DIP Extension-Flexion       Composite Flexion (Tip of 3rd Digit to Distal Palmar Crease (cm))         Joint mobility:   [x]Normal    []Hypo   []Hyper    Strength WFL: [x]Yes []No (if checked, see below)    Strength (0-5) Left Right    Shoulder Flex     Shoulder Abd     Shoulder ER     Shoulder IR     Biceps     Triceps     Pronation      Supination      Wrist Flex     Wrist Ext     Wrist Radial Deviation         Orthopaedic Special Tests Positive  Negative  NT Comments    Shoulder        Empty Can              Elbow        Cozen's       Tinel's   x            Hand/Wrist       Finkelstein's X L      Tinel's  x     Phalen's       Froment's       [place] Sign       [place] Deformity       [place] Deformity       [place]'s Nodes (DIP)       Yamile's Nodes (PIP)       Mallet Finger       Grind Test Gris López)                      Hand Assessment Left  Right  Comments    9 Hole Peg Test (s)       Strength (lbs) 39 48    Lateral Pinch Strength (lbs)      Palmar Pinch Strength (lbs)      Tip Pinch Strength (lbs)      Opposition (Y/N) Y Y      Functional Outcome Measures:  Quick DASH: total score- 34, disability score- 52.27%        Functional Mobility/Transfers: independent      Posture: rounded shoulders    Synergy/Muscle tone: normotonic, some increased tightness in dorsal forearm of L side into extensor wad    Sensation: no n/t    Coordination:  WFL    Visual Acuity: wears glasses at all times    Perception: WFL    MVPT:      Trail making A:     Trail making B:     Visual field cut:    Cognition: WFL                  [x] Patient history, allergies, meds reviewed. Medical chart reviewed. See intake form.      Review Of Systems (ROS):  [x]Performed Review of systems (Integumentary, CardioPulmonary, Neurological) by intake and observation. Intake form has been scanned into medical record. Patient has been instructed to contact their primary care physician regarding ROS issues if not already being addressed at this time.       Co-morbidities/Complexities (which will affect course of rehabilitation):   []None        []Hx of COVID   Arthritic conditions   []Rheumatoid arthritis (M05.9)  [x]Osteoarthritis (M19.91)  []Gout   Cardiovascular conditions   []Hypertension (I10)  []Hyperlipidemia (E78.5)  []Angina pectoris (I20)  []Atherosclerosis (I70)  []Pacemaker  []Hx of CABG/stent/  cardiac surgeries   Musculoskeletal conditions   []Disc pathology   []Congenital spine pathologies   []Osteoporosis (M81.8)  []Osteopenia (M85.8)  []Scoliosis   -spinal stenosis, chronic back pain       Endocrine conditions   []Hypothyroid (E03.9)  []Hyperthyroid Gastrointestinal conditions   []Constipation (X76.12)   Metabolic conditions   []Morbid obesity (E66.01)  [x]Diabetes type 1(E10.65) or 2 (E11.65)   []Neuropathy (G60.9)     Cardio/Pulmonary conditions   []Asthma (J45)  []Coughing   []COPD (J44.9)  []CHF  []A-fib   Psychological Disorders  []Anxiety (F41.9)  []Depression (F32.9)   []Other:   Developmental Disorders  []Autism (F84.0)  []CP (G80)  []Down Syndrome (Q90.9)  []Developmental delay     Neurological conditions  []Prior Stroke (I69.30)  []Parkinson's (G20)  []Encephalopathy (G93.40)  []MS (G35)  []Post-polio (G14)  []SCI  []TBI  []ALS Other conditions  []Fibromyalgia (M79.7)  []Vertigo  []Syncope  []Kidney Failure  []Cancer      []currently undergoing                treatment  []Pregnancy  []Incontinence   Prior surgeries  []involved limb  []previous spinal surgery  [] section birth  []hysterectomy  []bowel / bladder surgery  []other relevant surgeries   []Other:          Barriers to/and or personal factors that will affect rehab potential: [x]Age  []Sex    []Smoker              []Motivation/Lack of Motivation                        [x]Co-Morbidities              []Cognitive Function, education/learning barriers              []Environmental, home barriers              []profession/work barriers  [x]past PT/medical experience  []other:  Justification: Pt would benefit from skilled outpatient OT services to address pain and deficits in functional strength and use. Falls Risk Assessment (30 days):   [x] Falls risk assessed and no intervention required. [] Falls risk assessed (via clinical reasoning) and patient requires intervention due to being higher risk for falls  [] Falls education provided, including       ASSESSMENT: The pt has chief complaints of pain and deficits in strength and use. These symptoms as well as client factor and performance skill deficits create barriers to the patient engaging in desired occupational pursuits. Therefore, the patient is in need of skilled occupational therapy services to mitigate functional deficits in order to allow the patient to return to baseline, prevent further decline, and/or compensate for decreased functional abilities.       Functional Impairments and Activity Limitations (from functional questionnaire, intake, and interview)    []Reduced participation in functional mobility   []Reduced cognition   [x]Reduced participation in high level IADLs   []Reduced participation ADLs   []Reduced endurance  []Reduced fine motor control   []Reduced ROM   []Reduced sensation   []Reduced coordination  [x]Reduced strength   []Reduced balance   []Reduced posture   []Reduced safety awareness   []Reduced functional vision      Participation Restrictions   None noted on order   Prognosis/Rehab Potential:      []Excellent   [x]Good    []Fair   []Poor    Tolerance of evaluation/treatment:    [x]Excellent   []Good    []Fair   []Poor    Occupational Therapy Evaluation Complexity Justification   [x] A Occupational Profile/Medical and Therapy History  [] no personal factors and/or comorbidities that impact the plan of care; brief history relating to presenting problem  [x]1-2 personal factors and/or comorbidities that impact the plan of care; additional review of physical, cognitive, or psychosocial performance  []3 personal factors and/or comorbidities that impact the plan of care; extensive review of physical cognitive, psychosocial performance  [x] Patient Assessment:  [x] a total of 1-3 performance deficits relating to physical, cognitive, psychosocial limitations/restrictions  [] a total of 3-5 performance deficits relating to physical, cognitive, psychosocial limitations/restrictions  [] a total of 5 or more performance deficits relating to physical, cognitive, psychosocial limitations/restrictions  [x] A clinical presentation with:  [x] low complexity, limited amount of treatment options no assessment modification, no co-morbidities  [] moderate analytical complexity, detailed assessments, minimal to moderate modification of assessments, may have co-morbidities  [] high analytic complexity, comprehensive assessments, multiple treatment options, significant modifications of assessment, co-morbidities affecting performance  [x] Clinical decision making of [x] low, [] moderate, [] high complexity using standardized patient assessment instrument and/or measurable assessment of functional outcome.     [x] EVAL (LOW) 10034 (typically 15 minutes face-to-face)  [] EVAL (MOD) 06340 (typically 30 minutes face-to-face)  [] EVAL (HIGH) 72815 (typically 45 minutes face-to-face)  [] RE-EVAL 51266    PLAN:  Frequency/Duration:  2 days per week for 5 Weeks:  INTERVENTIONS:  [x] Strength training, ROM, balance training, functional mobility training  [] Neuromuscular re-education and positioning  [x] Modalities as needed that may include: E-stim, Ultrasound, Fluidotherapy, Iontophoresis as indicated, Paraffin, Hot Packs, Cold Packs  [x]

## 2021-04-21 NOTE — FLOWSHEET NOTE
and verbalizing understanding of completion. Therapeutic Exercises  Resistance / level Sets/sec Reps Notes                                                                                Neuromuscular Re-ed / Therapeutic Activities                                                 Manual Intervention                                                     Modalities: 6 minutes paraffin    Patient education:  ABHI Martini de Quervain's, HEP- pt verbalized understanding      Home Exercise Program:  Written and verbal HEP instructions provided and reviewed:  · Use of neoprene thumb spica splint for support, inflammation benefits, and pain relief   · Thumb opposition, abduction, and extension   · Wrist flexion/extension and grading force during grasp to reduce extrinsic tightness    Therapeutic Exercise and NMR:  [x] (06889) Provided verbal/tactile cueing for activities related to strengthening, flexibility, endurance, ROM  for improvements in scapular, scapulothoracic and UE control with self care, reaching, carrying, lifting, house/yardwork, driving/computer work.    [] (22427) Provided verbal/tactile cueing for activities related to improving balance, coordination, kinesthetic sense, posture, motor skill, proprioception  to assist with  scapular, scapulothoracic and UE control with self care, reaching, carrying, lifting, house/yardwork, driving/computer work.   [] Comments:    Therapeutic Activities:    [] (24540 or 47057) Provided verbal/tactile cueing for activities related to improving balance, coordination, kinesthetic sense, posture, motor skill, proprioception and motor activation to allow for proper function of scapular, scapulothoracic and UE control with self care, carrying, lifting, driving/computer work  [] Comments:    Home Exercise Program:    [x] (75196) Reviewed/Progressed HEP activities related to strengthening, flexibility, endurance, ROM of scapular, scapulothoracic and UE control with self care, reaching, carrying, lifting, house/yardwork, driving/computer work  [] (05668) Reviewed/Progressed HEP activities related to improving balance, coordination, kinesthetic sense, posture, motor skill, proprioception of scapular, scapulothoracic and UE control with self care, reaching, carrying, lifting, house/yardwork, driving/computer work    [] Comments:    Manual Treatments:  PROM / STM / Oscillations-Mobs:  G-I, II, III, IV (PA's, Inf., Post.)  [x] (45892) Provided manual therapy to mobilize soft tissue/joints of cervical/CT, scapular GHJ and UE for the purpose of modulating pain, promoting relaxation,  increasing ROM, reducing/eliminating soft tissue swelling/inflammation/restriction, improving soft tissue extensibility and allowing for proper ROM for normal function with self care, reaching, carrying, lifting, house/yardwork, driving/computer work  [] Comments:    ADL Training:  [] (31463) Provided self-care/home management training related to activities of daily living and compensatory training, and/or use of adaptive equipment   [] Comments:     Splinting:  [] Fabrication of:   [] (71830) Orthotic/Prosthetic Management, subsequent encounter  [] (75399) Orthotic management and training (fitting and assessment)  [] Comments:      Charges:  Timed Code Treatment Minutes: 24   Total Treatment Minutes: 45     [x] EVAL (LOW) 53213   [] OT Re-eval (49028)  [] EVAL (MOD) 60233   [] EVAL (HIGH) 19033       [x] Boby [phone] x    1 [] YQIVB(90050)  [] NMR (21554) x     [] Estim (attended) (82201)   [x] Manual ([phone]) x    1 [] US (79155)  [] TA (82243) x     [] Paraffin (55457)  [] ADL  ([phone]) x    [] Splint/L code:    [] Estim (unattended) ([phone])  [] Fluidotherapy (10854)  [] Other:    GOALS:   Patient stated goal: decrease pain, regain function  []? Progressing: []? Met: []? Not Met: []? Adjusted     Therapist goals for Patient:   Short Term Goals: To be achieved in: 30 days  1.  Pt will increase hand  strength by

## 2021-04-23 ENCOUNTER — HOSPITAL ENCOUNTER (OUTPATIENT)
Dept: OCCUPATIONAL THERAPY | Age: 71
Setting detail: THERAPIES SERIES
Discharge: HOME | End: 2021-04-23
Payer: MEDICARE

## 2021-04-23 PROCEDURE — 97140 MANUAL THERAPY 1/> REGIONS: CPT

## 2021-04-23 PROCEDURE — 97110 THERAPEUTIC EXERCISES: CPT

## 2021-04-23 PROCEDURE — 97018 PARAFFIN BATH THERAPY: CPT

## 2021-04-23 NOTE — FLOWSHEET NOTE
[facility] - Outpatient Occupational Therapy      Hand Therapy Daily Treatment Note  Date:  2021    Patient: Bella Mitchell \"Marychuy\" or \"Cat\"    : 1950  MRN: [id number]  Referring Physician:   Dr. Melissa Albert MD         Medical Diagnosis Information:  N53.641 (ICD-10-CM) - Left wrist pain  M65.4 (ICD-10-CM) - Miah Human Quervain's tenosynovitis, left                                               Insurance information:   [place]icare and [employer]- [place], no auth  Date of Injury: s/s ~1 month ago  Date of Surgery: n/a    Progress Report: []  Yes  [x]  No     Date Range for reporting period:  Beginnin21   Ending: end of POC    Progress report due (10 Rx/or 30 days whichever is less): visit #10 or     Recertification due (POC duration/ or 90 days whichever is less): visit #10 or 21    Visit # Insurance Allowable Auth required? Date Range   2/10 [place] []  Yes  [x]  No n/a       Latex Allergy:  [x]No      []Yes  Pacemaker:  [x] No       [] Yes     Preferred Language for Healthcare:   [x]English       []other:    Pain level:  4/10     SUBJECTIVE:  Pt reports pain is not constant anymore, but more intermittent, with kinesio tape helping to decrease pain. Pt reports she ordered neoprene thumb spica and paraffin bath. Pt reports mild pain with one exercise from HEP. Functional Disability Index: Quick DASH: total score- 34, disability score- 52.27%    OBJECTIVE: See eval      RESTRICTIONS/PRECAUTIONS: none noted on order    Exercises/Interventions: Treatment focused on decreasing pain and increasing strength to improve functional use of L hand. Session initiated with 8 minutes paraffin bath for pain relief and soft tissue benefits, followed by STM of dorsal forearm with decreased tightness noted at extensor wad on this date. Pt reports she has completed light massage at home and has been compliant with stretches.  Pt demonstrated HEP exercise that has caused pain with therapist educating on avoiding ulnar deviation during thumb opposition by placing ulnar border of wrist on table while completing exercise; with this modification, pt reported no pain. Pt completed TE below with no increase in pain, but pt reporting \"I can feel it working out. \" Pt kinesio taped using technique for de Quervain's tenosynovitis for thumb and wrist support and inflammation/edema benefits with pt again reporting immediate pain relief. Therapeutic Exercises  Resistance / level Sets/sec Reps Notes   Thumb opposition/extension    none    Thin rubber band 1    1 8    6    Finger ab/adduction Thin rubber band 1 10    Wrist flexion/extension  1 8 each                                                            Neuromuscular Re-ed / Therapeutic Activities                                                 Manual Intervention       STM       kinesio tape                                       Modalities: 8 minutes paraffin    Patient education:  ABHI Martini, de Quervain's, HEP- pt verbalized understanding      Home Exercise Program:  Written and verbal HEP instructions provided and reviewed:  · Use of neoprene thumb spica splint for support, inflammation benefits, and pain relief   · Thumb opposition, abduction, and extension   · Wrist flexion/extension and grading force during grasp to reduce extrinsic tightness    Therapeutic Exercise and NMR:  [x] (26801) Provided verbal/tactile cueing for activities related to strengthening, flexibility, endurance, ROM  for improvements in scapular, scapulothoracic and UE control with self care, reaching, carrying, lifting, house/yardwork, driving/computer work.    [] (62001) Provided verbal/tactile cueing for activities related to improving balance, coordination, kinesthetic sense, posture, motor skill, proprioception  to assist with  scapular, scapulothoracic and UE control with self care, reaching, carrying, lifting, house/yardwork, driving/computer work.   [] Comments:    Therapeutic Activities:    [] (00695 or 69452) Provided verbal/tactile cueing for activities related to improving balance, coordination, kinesthetic sense, posture, motor skill, proprioception and motor activation to allow for proper function of scapular, scapulothoracic and UE control with self care, carrying, lifting, driving/computer work  [] Comments:    Home Exercise Program:    [x] (19087) Reviewed/Progressed HEP activities related to strengthening, flexibility, endurance, ROM of scapular, scapulothoracic and UE control with self care, reaching, carrying, lifting, house/yardwork, driving/computer work  [] (13239) Reviewed/Progressed HEP activities related to improving balance, coordination, kinesthetic sense, posture, motor skill, proprioception of scapular, scapulothoracic and UE control with self care, reaching, carrying, lifting, house/yardwork, driving/computer work    [] Comments:    Manual Treatments:  PROM / STM / Oscillations-Mobs:  G-I, II, III, IV (PA's, Inf., Post.)  [x] (98242) Provided manual therapy to mobilize soft tissue/joints of cervical/CT, scapular GHJ and UE for the purpose of modulating pain, promoting relaxation,  increasing ROM, reducing/eliminating soft tissue swelling/inflammation/restriction, improving soft tissue extensibility and allowing for proper ROM for normal function with self care, reaching, carrying, lifting, house/yardwork, driving/computer work  [] Comments:    ADL Training:  [] (76828) Provided self-care/home management training related to activities of daily living and compensatory training, and/or use of adaptive equipment   [] Comments:     Splinting:  [] Fabrication of:   [] (15188) Orthotic/Prosthetic Management, subsequent encounter  [] (56618) Orthotic management and training (fitting and assessment)  [] Comments:      Charges:  Timed Code Treatment Minutes: 37   Total Treatment Minutes: 45     [] EVAL (LOW) 38358   [] OT Re-eval (08083)  [] EVAL (MOD) 77116   [] EVAL (HIGH) 12057       [x] Boby (20155) x    1 [] LPTPE(37548)  [] NMR (13766) x     [] Estim (attended) (48549)   [x] Manual (99917) x    1 [] US (78919)  [] TA (91028) x     [x] Paraffin (83466)  [] ADL  (85272) x    [] Splint/L code:    [] Estim (unattended) (65289)  [] Fluidotherapy (18629)  [] Other:    GOALS:   Patient stated goal: decrease pain, regain function  [x]? Progressing: []? Met: []? Not Met: []? Adjusted     Therapist goals for Patient:   Short Term Goals: To be achieved in: 30 days  1. Pt will increase hand  strength by at least 5 pounds to improve functional strength and control in IADLs by 5/21/21. [x]? Progressing: []? Met: []? Not Met: []? Adjusted  2. Pt will decrease pain and increase functional strength to complete cutting, stirring, and lifting tasks required for meal preparation by 5/21/21. [x]? Progressing: []? Met: []? Not Met: []? Adjusted     Long Term Goals: To be achieved by discharge  1. Pt will report a QuickDASH Symptom Severity Scale score of 40% or less indicating increased safety and functional independence in desired occupational pursuits by discharge. [x]? Progressing: []? Met: []? Not Met: []? Adjusted    Progression Towards Functional goals:  [x] Patient is progressing as expected towards functional goals listed. [] Progression is slowed due to complexities listed. [] Progression has been slowed due to co-morbidities.   [] Plan just implemented, too soon to assess goals progression  [] All goals are met  [] Other:     ASSESSMENT:  See eval    Treatment/Activity Tolerance:  [x] Patient tolerated treatment well [] Patient limited by fatique  [] Patient limited by pain  [] Patient limited by other medical complications  [] Other:     Prognosis: [x] Good [] Fair  [] Poor    Patient Requires Follow-up: [x] Yes  [] No    PLAN: See eval  [x] Continue per plan of care [] Alter current plan (see comments)  [x] Plan of care initiated (MD cosigned) [] Hold pending MD visit [] Discharge    Electronically signed by: María Elena Red OTR/L [id number]      Note: If patient does not return for scheduled/ recommended follow up visits, this note will serve as a discharge from care along with most recent update on progress.

## 2021-04-26 ENCOUNTER — HOSPITAL ENCOUNTER (OUTPATIENT)
Dept: OCCUPATIONAL THERAPY | Age: 71
Setting detail: THERAPIES SERIES
Discharge: HOME | End: 2021-04-26
Payer: MEDICARE

## 2021-04-26 PROCEDURE — 97140 MANUAL THERAPY 1/> REGIONS: CPT

## 2021-04-26 PROCEDURE — 97110 THERAPEUTIC EXERCISES: CPT

## 2021-04-26 NOTE — FLOWSHEET NOTE
[facility] - Outpatient Occupational Therapy      Hand Therapy Daily Treatment Note  Date:  2021    Patient: Adrián Blocker \"Marychuy\" or \"Cat\"    : 1950  MRN: [id number]  Referring Physician:   Dr. Kena Goodson MD         Medical Diagnosis Information:  T03.666 (ICD-10-CM) - Left wrist pain  M65.4 (ICD-10-CM) - Dairl Cull Quervain's tenosynovitis, left                                               Insurance information:   [place] and [employer]- [place], no auth  Date of Injury: s/s ~1 month ago  Date of Surgery: n/a    Progress Report: []  Yes  [x]  No     Date Range for reporting period:  Beginnin21   Ending: end of POC    Progress report due (10 Rx/or 30 days whichever is less): visit #10 or     Recertification due (POC duration/ or 90 days whichever is less): visit #10 or 21    Visit # Insurance Allowable Auth required? Date Range   3/10 [place] []  Yes  [x]  No n/a       Latex Allergy:  [x]No      []Yes  Pacemaker:  [x] No       [] Yes     Preferred Language for Healthcare:   [x]English       []other:    Pain level:  2/10     SUBJECTIVE:  Pt reports pain has significantly decreased and kinesio tape continues to relieve pain. Pt reports she still has not received order for neoprene thumb spica and paraffin bath, but hopes to have it soon. Functional Disability Index: Quick DASH: total score- 34, disability score- 52.27%    OBJECTIVE: See eval      RESTRICTIONS/PRECAUTIONS: none noted on order    Exercises/Interventions: Treatment focused on decreasing pain and increasing strength to improve functional use of L hand. Session initiated with TE below with no increase in pain for stability and strengthening. Pt kinesio taped using technique for de Quervain's tenosynovitis for thumb and wrist support and inflammation/edema benefits with pt again reporting immediate pain relief.  Pt participated in task grasping tennis racket with bean bags placed on top to flip into pronation for wrist stability and strengthening with emphasis on neutral wrist. Pt received brief STM of extensor wad due to mild remaining tightness. Pt then completed forward reach with palms placed together, coming back into elbow flexion and scapular retraction for scapular stability and contract/relax of forearms with verbal and physical cues to prevent shoulder elevation. Session concluded with discussion of joint protection strategies and AE such as assist to open jars/cans with pt verbalizing understanding.      Therapeutic Exercises  Resistance / level Sets/sec Reps Notes         Short arc wrist flexion/extension 1# 1 8    Short arc radial/ulnar deviation 1# 1 8    Short arc pronation/supination 1# 1 8                                              Neuromuscular Re-ed / Therapeutic Activities                                                 Manual Intervention       STM       kinesio tape                                       Modalities:    Patient education:  ABHI Martini de Quervain's, HEP- pt verbalized understanding      Home Exercise Program:  Written and verbal HEP instructions provided and reviewed:  · Use of neoprene thumb spica splint for support, inflammation benefits, and pain relief   · Thumb opposition, abduction, and extension   · Wrist flexion/extension and grading force during grasp to reduce extrinsic tightness    Therapeutic Exercise and NMR:  [x] (92641) Provided verbal/tactile cueing for activities related to strengthening, flexibility, endurance, ROM  for improvements in scapular, scapulothoracic and UE control with self care, reaching, carrying, lifting, house/yardwork, driving/computer work.    [] (17066) Provided verbal/tactile cueing for activities related to improving balance, coordination, kinesthetic sense, posture, motor skill, proprioception  to assist with  scapular, scapulothoracic and UE control with self care, reaching, carrying, lifting, house/yardwork, driving/computer work.  [] Comments:    Therapeutic Activities:    [] (66090 or 74879) Provided verbal/tactile cueing for activities related to improving balance, coordination, kinesthetic sense, posture, motor skill, proprioception and motor activation to allow for proper function of scapular, scapulothoracic and UE control with self care, carrying, lifting, driving/computer work  [] Comments:    Home Exercise Program:    [x] (28632) Reviewed/Progressed HEP activities related to strengthening, flexibility, endurance, ROM of scapular, scapulothoracic and UE control with self care, reaching, carrying, lifting, house/yardwork, driving/computer work  [] (10472) Reviewed/Progressed HEP activities related to improving balance, coordination, kinesthetic sense, posture, motor skill, proprioception of scapular, scapulothoracic and UE control with self care, reaching, carrying, lifting, house/yardwork, driving/computer work    [] Comments:    Manual Treatments:  PROM / STM / Oscillations-Mobs:  G-I, II, III, IV (PA's, Inf., Post.)  [x] (72328) Provided manual therapy to mobilize soft tissue/joints of cervical/CT, scapular GHJ and UE for the purpose of modulating pain, promoting relaxation,  increasing ROM, reducing/eliminating soft tissue swelling/inflammation/restriction, improving soft tissue extensibility and allowing for proper ROM for normal function with self care, reaching, carrying, lifting, house/yardwork, driving/computer work  [] Comments:    ADL Training:  [] (68148) Provided self-care/home management training related to activities of daily living and compensatory training, and/or use of adaptive equipment   [] Comments:     Splinting:  [] Fabrication of:   [] (88968) Orthotic/Prosthetic Management, subsequent encounter  [] (50772) Orthotic management and training (fitting and assessment)  [] Comments:      Charges:  Timed Code Treatment Minutes: 45   Total Treatment Minutes: 45     [] EVAL (LOW) 22 [id number]   [] OT Re-eval (94653)  [] EVAL (MOD) 92436   [] EVAL (HIGH) 14173       [x] Boby (48299) x    2 [] IHEVK(64629)  [] NMR (28247) x     [] Estim (attended) (18698)   [x] Manual (62707) x    1 [] US (46279)  [] TA (53469) x     [] Paraffin (94932)  [] ADL  (75337) x    [] Splint/L code:    [] Estim (unattended) (22 [id number])  [] Fluidotherapy (60482)  [] Other:    GOALS:   Patient stated goal: decrease pain, regain function  [x]? Progressing: []? Met: []? Not Met: []? Adjusted     Therapist goals for Patient:   Short Term Goals: To be achieved in: 30 days  1. Pt will increase hand  strength by at least 5 pounds to improve functional strength and control in IADLs by 5/21/21. [x]? Progressing: []? Met: []? Not Met: []? Adjusted  2. Pt will decrease pain and increase functional strength to complete cutting, stirring, and lifting tasks required for meal preparation by 5/21/21. [x]? Progressing: []? Met: []? Not Met: []? Adjusted     Long Term Goals: To be achieved by discharge  1. Pt will report a QuickDASH Symptom Severity Scale score of 40% or less indicating increased safety and functional independence in desired occupational pursuits by discharge. [x]? Progressing: []? Met: []? Not Met: []? Adjusted    Progression Towards Functional goals:  [x] Patient is progressing as expected towards functional goals listed. [] Progression is slowed due to complexities listed. [] Progression has been slowed due to co-morbidities. [] Plan just implemented, too soon to assess goals progression  [] All goals are met  [] Other:     ASSESSMENT:  Pt has made improvements in pain-free strength and ROM with some continued mild pain at base of thumb.      Treatment/Activity Tolerance:  [x] Patient tolerated treatment well [] Patient limited by fatique  [] Patient limited by pain  [] Patient limited by other medical complications  [] Other:     Prognosis: [x] Good [] Fair  [] Poor    Patient Requires Follow-up: [x] Yes  [] No    PLAN: See eval  [x] Continue per plan of care [] Alter current plan (see comments)  [x] Plan of care initiated (MD cosigned) [] Hold pending MD visit [] Discharge    Electronically signed by: MARQUIS Murphy/L [id number]      Note: If patient does not return for scheduled/ recommended follow up visits, this note will serve as a discharge from care along with most recent update on progress.

## 2021-04-29 ENCOUNTER — HOSPITAL ENCOUNTER (OUTPATIENT)
Dept: OCCUPATIONAL THERAPY | Age: 71
Setting detail: THERAPIES SERIES
Discharge: HOME | End: 2021-04-29
Payer: MEDICARE

## 2021-04-29 PROCEDURE — 97140 MANUAL THERAPY 1/> REGIONS: CPT

## 2021-04-29 PROCEDURE — 97110 THERAPEUTIC EXERCISES: CPT

## 2021-04-29 PROCEDURE — 97530 THERAPEUTIC ACTIVITIES: CPT

## 2021-04-29 NOTE — FLOWSHEET NOTE
home.  Patient also instructed in safe use of home paraffin bath at 113-126. Patient verbalized understanding. Patient also educated in demonstrated gentle mobilization of metacarpel / phalengeal joint into abduction and extension with verbal cues to complete with good eccentric control. Slow speed and progressive stretch. Therapeutic Exercises  Resistance / level Sets/sec Reps Notes         Short arc wrist flexion/extension 1# 1 8    Short arc radial/ulnar deviation 1# 1 8    Short arc pronation/supination 1# 1 8                                              Neuromuscular Re-ed / Therapeutic Activities                                                 Manual Intervention       STM  Along extensor pollicis longus        kinesio tape                                       Modalities:    Patient education:  Eval, POC, de Quervain's, HEP- pt verbalized understanding      Home Exercise Program:  Written and verbal HEP instructions provided and reviewed:  · Use of neoprene thumb spica splint for support, inflammation benefits, and pain relief   · Thumb opposition, abduction, and extension   · Wrist flexion/extension and grading force during grasp to reduce extrinsic tightness    Therapeutic Exercise and NMR:  [x] (56047) Provided verbal/tactile cueing for activities related to strengthening, flexibility, endurance, ROM  for improvements in scapular, scapulothoracic and UE control with self care, reaching, carrying, lifting, house/yardwork, driving/computer work.    [] (89247) Provided verbal/tactile cueing for activities related to improving balance, coordination, kinesthetic sense, posture, motor skill, proprioception  to assist with  scapular, scapulothoracic and UE control with self care, reaching, carrying, lifting, house/yardwork, driving/computer work.   [] Comments:    Therapeutic Activities:    [] (79490 or 37522) Provided verbal/tactile cueing for activities related to improving balance, coordination, kinesthetic sense, posture, motor skill, proprioception and motor activation to allow for proper function of scapular, scapulothoracic and UE control with self care, carrying, lifting, driving/computer work  [] Comments:    Home Exercise Program:    [x] (20878) Reviewed/Progressed HEP activities related to strengthening, flexibility, endurance, ROM of scapular, scapulothoracic and UE control with self care, reaching, carrying, lifting, house/yardwork, driving/computer work  [] (31472) Reviewed/Progressed HEP activities related to improving balance, coordination, kinesthetic sense, posture, motor skill, proprioception of scapular, scapulothoracic and UE control with self care, reaching, carrying, lifting, house/yardwork, driving/computer work    [] Comments:    Manual Treatments:  PROM / STM / Oscillations-Mobs:  G-I, II, III, IV (PA's, Inf., Post.)  [x] (72631) Provided manual therapy to mobilize soft tissue/joints of cervical/CT, scapular GHJ and UE for the purpose of modulating pain, promoting relaxation,  increasing ROM, reducing/eliminating soft tissue swelling/inflammation/restriction, improving soft tissue extensibility and allowing for proper ROM for normal function with self care, reaching, carrying, lifting, house/yardwork, driving/computer work  [] Comments:    ADL Training:  [] (71452) Provided self-care/home management training related to activities of daily living and compensatory training, and/or use of adaptive equipment   [] Comments:     Splinting:  [] Fabrication of:   [] (66203) Orthotic/Prosthetic Management, subsequent encounter  [] (99589) Orthotic management and training (fitting and assessment)  [] Comments:      Charges:  Timed Code Treatment Minutes: 45   Total Treatment Minutes: 45     [] EVAL (LOW) 57661   [] OT Re-eval (37303)  [] EVAL (MOD) 01135   [] EVAL (HIGH) 56282       [x] Boby (85186) x    1 [] VEJPM(77849)  [] NMR (36185) x     [] Estim (attended) (46881)   [x] Manual ([id number]) x 1 [] US (42705)  [] TA (31573) x     [] Paraffin (29594)  [x] ADL  (19946) x   1 [] Splint/L code:    [] Estim (unattended) (07231)  [] Fluidotherapy (15331)  [] Other:    GOALS:   Patient stated goal: decrease pain, regain function  [x]? Progressing: []? Met: []? Not Met: []? Adjusted     Therapist goals for Patient:   Short Term Goals: To be achieved in: 30 days  1. Pt will increase hand  strength by at least 5 pounds to improve functional strength and control in IADLs by 5/21/21. [x]? Progressing: []? Met: []? Not Met: []? Adjusted  2. Pt will decrease pain and increase functional strength to complete cutting, stirring, and lifting tasks required for meal preparation by 5/21/21. [x]? Progressing: []? Met: []? Not Met: []? Adjusted     Long Term Goals: To be achieved by discharge  1. Pt will report a QuickDASH Symptom Severity Scale score of 40% or less indicating increased safety and functional independence in desired occupational pursuits by discharge. [x]? Progressing: []? Met: []? Not Met: []? Adjusted    Progression Towards Functional goals:  [x] Patient is progressing as expected towards functional goals listed. [] Progression is slowed due to complexities listed. [] Progression has been slowed due to co-morbidities. [] Plan just implemented, too soon to assess goals progression  [] All goals are met  [] Other:     ASSESSMENT:  Pt has made improvements in pain-free strength and ROM with some continued mild pain at base of thumb.      Treatment/Activity Tolerance:  [x] Patient tolerated treatment well [] Patient limited by fatique  [] Patient limited by pain  [] Patient limited by other medical complications  [] Other:     Prognosis: [x] Good [] Fair  [] Poor    Patient Requires Follow-up: [x] Yes  [] No    PLAN: See eval  [x] Continue per plan of care [] Alter current plan (see comments)  [] Plan of care initiated (MD cosigned) [] Hold pending MD visit [] Discharge    Electronically signed by: Note: If patient does not return for scheduled/ recommended follow up visits, this note will serve as a discharge from care along with most recent update on progress.

## 2021-05-04 ENCOUNTER — HOSPITAL ENCOUNTER (OUTPATIENT)
Dept: OCCUPATIONAL THERAPY | Age: 71
Setting detail: THERAPIES SERIES
Discharge: HOME | End: 2021-05-04
Payer: MEDICARE

## 2021-05-04 PROCEDURE — 97022 WHIRLPOOL THERAPY: CPT

## 2021-05-04 PROCEDURE — 97110 THERAPEUTIC EXERCISES: CPT

## 2021-05-04 PROCEDURE — 97535 SELF CARE MNGMENT TRAINING: CPT

## 2021-05-04 NOTE — FLOWSHEET NOTE
[facility] - Outpatient Occupational Therapy      Hand Therapy Daily Treatment Note  Date:  2021    Patient: Andres Childress \"Marychuy\" or \"Cat\"    : 1950  MRN: [id number]  Referring Physician:   Dr. Annamaria Baca MD         Medical Diagnosis Information:  W13.566 (ICD-10-CM) - Left wrist pain  M65.4 (ICD-10-CM) - Justice  Quervain's tenosynovitis, left                                               Insurance information:   [place]ic[place] and [employer]- [place], no auth  Date of Injury: s/s ~1 month ago  Date of Surgery: n/a    Progress Report: []  Yes  [x]  No     Date Range for reporting period:  Beginnin21   Ending: end of POC    Progress report due (10 Rx/or 30 days whichever is less): visit #10 or     Recertification due (POC duration/ or 90 days whichever is less): visit #10 or 21    Visit # Insurance Allowable Auth required? Date Range   4/10 [place] []  Yes  [x]  No n/a       Latex Allergy:  [x]No      []Yes  Pacemaker:  [x] No       [] Yes     Preferred Language for Healthcare:   [x]English       []other:    Pain level:  5/10 Patient reports she has been taking 1 Alieve before bed but she may need to double up on it. SUBJECTIVE:  Patient reports hand feels warm    Functional Disability Index: Quick DASH: total score- 34, disability score- 52.27%    OBJECTIVE: See eval      RESTRICTIONS/PRECAUTIONS: none noted on order    Exercises/Interventions: Treatment focused on decreasing pain and increasing strength to improve functional use of L hand. Session initiated with TE below with no increase in pain for stability and strengthening. MLD for decreasing edema followed by fluidotherapy for 9 minutes sensory benefits. Patient reports that brace really helps her get her bra on. Patient educated in donning bra over head like a t shirt.   Pt kinesio taped using technique for de Quervain's tenosynovitis for thumb and wrist support and inflammation/edema benefits with pt again reporting immediate pain relief. Patient also fitted with compression sleeve for increased myofascial support. Patient then completed squigz focusing on lateral pinch , in hand translation of objects times 6 3 sets. Patient ended with tip pinch removing squiz from table top times 6. Pain decreased with taping to 3. Therapeutic Exercises  Resistance / level Sets/sec Reps Notes         Short arc wrist flexion/extension 1# 1 8    Short arc radial/ulnar deviation 1# 1 8    Short arc pronation/supination 1# 1 8                                              Neuromuscular Re-ed / Therapeutic Activities                                                 Manual Intervention       STM  Along extensor pollicis longus        kinesio tape                                       Modalities:9 minutes fluidotherapy     Patient education:  ABHI Martini, moose Feliciano's, HEP- pt verbalized understanding      Home Exercise Program:  Written and verbal HEP instructions provided and reviewed:  · Use of neoprene thumb spica splint for support, inflammation benefits, and pain relief   · Thumb opposition, abduction, and extension   · Wrist flexion/extension and grading force during grasp to reduce extrinsic tightness    Therapeutic Exercise and NMR:  [x] (34454) Provided verbal/tactile cueing for activities related to strengthening, flexibility, endurance, ROM  for improvements in scapular, scapulothoracic and UE control with self care, reaching, carrying, lifting, house/yardwork, driving/computer work.    [] (41084) Provided verbal/tactile cueing for activities related to improving balance, coordination, kinesthetic sense, posture, motor skill, proprioception  to assist with  scapular, scapulothoracic and UE control with self care, reaching, carrying, lifting, house/yardwork, driving/computer work.   [] Comments:    Therapeutic Activities:    [] (03658 or ) Provided verbal/tactile cueing for activities related to improving balance, coordination, kinesthetic sense, posture, motor skill, proprioception and motor activation to allow for proper function of scapular, scapulothoracic and UE control with self care, carrying, lifting, driving/computer work  [] Comments:    Home Exercise Program:    [x] (74194) Reviewed/Progressed HEP activities related to strengthening, flexibility, endurance, ROM of scapular, scapulothoracic and UE control with self care, reaching, carrying, lifting, house/yardwork, driving/computer work  [] (90741) Reviewed/Progressed HEP activities related to improving balance, coordination, kinesthetic sense, posture, motor skill, proprioception of scapular, scapulothoracic and UE control with self care, reaching, carrying, lifting, house/yardwork, driving/computer work    [] Comments:    Manual Treatments:  PROM / STM / Oscillations-Mobs:  G-I, II, III, IV (PA's, Inf., Post.)  [x] (56940) Provided manual therapy to mobilize soft tissue/joints of cervical/CT, scapular GHJ and UE for the purpose of modulating pain, promoting relaxation,  increasing ROM, reducing/eliminating soft tissue swelling/inflammation/restriction, improving soft tissue extensibility and allowing for proper ROM for normal function with self care, reaching, carrying, lifting, house/yardwork, driving/computer work  [] Comments:    ADL Training:  [] (42604) Provided self-care/home management training related to activities of daily living and compensatory training, and/or use of adaptive equipment   [] Comments:     Splinting:  [] Fabrication of:   [] (52994) Orthotic/Prosthetic Management, subsequent encounter  [] (94915) Orthotic management and training (fitting and assessment)  [] Comments:      Charges:  Timed Code Treatment Minutes: 30    Total Treatment Minutes:  38     [] EVAL (LOW) 96305   [] OT Re-eval (69486)  [] EVAL (MOD) 37043   [] EVAL (HIGH) 18119       [x] Boby (36155) x    1 [] LFENN(96224)  [] NMR (47525) x     [] Estim (attended) (64521) Discharge    Electronically signed by:        Note: If patient does not return for scheduled/ recommended follow up visits, this note will serve as a discharge from care along with most recent update on progress.

## 2021-05-06 ENCOUNTER — HOSPITAL ENCOUNTER (OUTPATIENT)
Dept: OCCUPATIONAL THERAPY | Age: 71
Setting detail: THERAPIES SERIES
Discharge: HOME | End: 2021-05-06
Payer: MEDICARE

## 2021-05-06 PROCEDURE — 97035 APP MDLTY 1+ULTRASOUND EA 15: CPT

## 2021-05-06 PROCEDURE — 97140 MANUAL THERAPY 1/> REGIONS: CPT

## 2021-05-06 PROCEDURE — 97110 THERAPEUTIC EXERCISES: CPT

## 2021-05-06 NOTE — FLOWSHEET NOTE
[facility] - Outpatient Occupational Therapy      Hand Therapy Daily Treatment Note  Date:  2021    Patient: Delaney Ojeda \"Marychuy\" or \"Cat\"    : 1950  MRN: [id number]  Referring Physician:   Dr. Elver Lopez MD         Medical Diagnosis Information:  W91.115 (ICD-10-CM) - Left wrist pain  M65.4 (ICD-10-CM) - Lorrene Clore Quervain's tenosynovitis, left                                               Insurance information:   [place] and [employer]- [place], no auth  Date of Injury: s/s ~1 month ago  Date of Surgery: n/a    Progress Report: []  Yes  [x]  No     Date Range for reporting period:  Beginnin21   Ending: end of POC    Progress report due (10 Rx/or 30 days whichever is less): visit #10 or     Recertification due (POC duration/ or 90 days whichever is less): visit #10 or 21    Visit # Insurance Allowable Auth required? Date Range   5/10 [place] []  Yes  [x]  No n/a       Latex Allergy:  [x]No      []Yes  Pacemaker:  [x] No       [] Yes     Preferred Language for Healthcare:   [x]English       []other:    Pain level:  3/10  donniee is helping 1 two times a day    SUBJECTIVE:  Patient reports hand feels warm    Functional Disability Index: Quick DASH: total score- 34, disability score- 52.27%    OBJECTIVE: See eval      RESTRICTIONS/PRECAUTIONS: none noted on order    Exercises/Interventions: Treatment focused on decreasing pain and increasing strength to improve functional use of L hand. Session initiated with TE below with no increase in pain for stability and strengthening. Soft tissue mobilization over thumb abductors and extensors , noted fibrotic tissue EPL tendon. Ultrasound completed over tendon for 8 minutes . Patient then Worked on increasing web space to hold onto large insulated coffee mug worked on reaching and grasping a cup with 1 pound weight and then bringing to mouth , dumping, and weight bearing through heel of hand and rolling cup on its side. Patient reported decreased pain after treatment at this time to 0. Patient provided with instruction in kinesiotaping if she needs it over the weekend and verbalized and demonstrated understanding however she chose to try not taping in this date and see how she does. Therapeutic Exercises  Resistance / level Sets/sec Reps Notes         Short arc wrist flexion/extension 1# 1 8    Short arc radial/ulnar deviation 1# 1 8    Short arc pronation/supination 1# 1 8                                              Neuromuscular Re-ed / Therapeutic Activities                                                 Manual Intervention       STM  Along extensor pollicis longus        kinesio tape                                       Modalities:9 minutes fluidotherapy     Patient education:  ABHI Martini, moose Feliciano's, HEP- pt verbalized understanding      Home Exercise Program:  Written and verbal HEP instructions provided and reviewed:  · Use of neoprene thumb spica splint for support, inflammation benefits, and pain relief   · Thumb opposition, abduction, and extension   · Wrist flexion/extension and grading force during grasp to reduce extrinsic tightness    Therapeutic Exercise and NMR:  [x] (15271) Provided verbal/tactile cueing for activities related to strengthening, flexibility, endurance, ROM  for improvements in scapular, scapulothoracic and UE control with self care, reaching, carrying, lifting, house/yardwork, driving/computer work.    [] (80930) Provided verbal/tactile cueing for activities related to improving balance, coordination, kinesthetic sense, posture, motor skill, proprioception  to assist with  scapular, scapulothoracic and UE control with self care, reaching, carrying, lifting, house/yardwork, driving/computer work.   [] Comments:    Therapeutic Activities:    [] (49824 or 53566) Provided verbal/tactile cueing for activities related to improving balance, coordination, kinesthetic sense, posture, motor skill, proprioception and motor activation to allow for proper function of scapular, scapulothoracic and UE control with self care, carrying, lifting, driving/computer work  [] Comments:    Home Exercise Program:    [x] (83870) Reviewed/Progressed HEP activities related to strengthening, flexibility, endurance, ROM of scapular, scapulothoracic and UE control with self care, reaching, carrying, lifting, house/yardwork, driving/computer work  [] (82600) Reviewed/Progressed HEP activities related to improving balance, coordination, kinesthetic sense, posture, motor skill, proprioception of scapular, scapulothoracic and UE control with self care, reaching, carrying, lifting, house/yardwork, driving/computer work    [] Comments:    Manual Treatments:  PROM / STM / Oscillations-Mobs:  G-I, II, III, IV (PA's, Inf., Post.)  [x] (75883) Provided manual therapy to mobilize soft tissue/joints of cervical/CT, scapular GHJ and UE for the purpose of modulating pain, promoting relaxation,  increasing ROM, reducing/eliminating soft tissue swelling/inflammation/restriction, improving soft tissue extensibility and allowing for proper ROM for normal function with self care, reaching, carrying, lifting, house/yardwork, driving/computer work  [] Comments:    ADL Training:  [] (92547) Provided self-care/home management training related to activities of daily living and compensatory training, and/or use of adaptive equipment   [] Comments:     Splinting:  [] Fabrication of:   [] (78099) Orthotic/Prosthetic Management, subsequent encounter  [] (12902) Orthotic management and training (fitting and assessment)  [] Comments:      Charges:  Timed Code Treatment Minutes: 30    Total Treatment Minutes:  38     [] EVAL (LOW) 97939   [] OT Re-eval (18226)  [] EVAL (MOD) 67173   [] EVAL (HIGH) 19595       [x] Boby (81325) x    1 [] EVXDN(95859)  [] NMR (99061) x     [] Estim (attended) (00881)   [x] Manual ([id number]) x   [] US (01116)  [] TA (12780) x     [] return for scheduled/ recommended follow up visits, this note will serve as a discharge from care along with most recent update on progress.

## 2021-05-11 ENCOUNTER — HOSPITAL ENCOUNTER (OUTPATIENT)
Dept: OCCUPATIONAL THERAPY | Age: 71
Setting detail: THERAPIES SERIES
Discharge: HOME | End: 2021-05-11
Payer: MEDICARE

## 2021-05-11 PROCEDURE — 97110 THERAPEUTIC EXERCISES: CPT

## 2021-05-11 PROCEDURE — 97035 APP MDLTY 1+ULTRASOUND EA 15: CPT

## 2021-05-11 PROCEDURE — 97140 MANUAL THERAPY 1/> REGIONS: CPT

## 2021-05-11 NOTE — FLOWSHEET NOTE
[facility] - Outpatient Occupational Therapy      Hand Therapy Daily Treatment Note  Date:  2021    Patient: Korin Na \"Marychuy\" or \"Cat\"    : 1950  MRN: [id number]  Referring Physician:   Dr. Jessica Vilchis MD         Medical Diagnosis Information:  V52.822 (ICD-10-CM) - Left wrist pain  M65.4 (ICD-10-CM) - Alta Spitz Quervain's tenosynovitis, left                                               Insurance information:   [place]ic[place] [employer]- [place], [place] auth  Date of Injury: s/s ~1 month ago  Date of Surgery: n/a    Progress Report: []  Yes  [x]  No     Date Range for reporting period:  Beginnin21   Ending: end of POC    Progress report due (10 Rx/or 30 days whichever is less): visit #10 or     Recertification due (POC duration/ or 90 days whichever is less): visit #10 or 21    Visit # Insurance Allowable Auth required? Date Range   6/10 [place] []  Yes  [x]  No n/a       Latex Allergy:  [x]No      []Yes  Pacemaker:  [x] No       [] Yes     Preferred Language for Healthcare:   [x]English       []other:    Pain level: 4/10  ghada is helping 1 two times a day    SUBJECTIVE:  Patient reports she has started doing paraffin bath every morning, Patient reports she feels like therapy including taping, ultrasound, and there exc is helping. She is hopeful she can do some baking for her churches anniversary by then end of the month. Functional Disability Index: Quick DASH: total score- 34, disability score- 52.27%    OBJECTIVE: See eval      RESTRICTIONS/PRECAUTIONS: none noted on order    Exercises/Interventions: Treatment focused on decreasing pain and increasing strength to improve functional use of L hand. Session initiated with soft tissue mobilization along EPL. Patient then rec'd ultrasound to fibrotic tissue radial aspect of forearm. Therapist then kinesiotaped thumb and forearm for myofascial support and mechanical correction for dequervains.   Patient then worked on functional strenthening with use of yellow theraband focusing on mcpj abduction with stable pip times 5 . Use of thumb as stabilizer on theraband while isolating mcpj spread of all digits and the isolating 2 and 3 , and 4 and 5 with theranand as resistance. Thumb stabilized theraband on table top durinng activity. Patient reported no pain after treatment. Therapeutic Exercises  Resistance / level Sets/sec Reps Notes         Short arc wrist flexion/extension 1# 1 8    Short arc radial/ulnar deviation 1# 1 8    Short arc pronation/supination 1# 1 8                                              Neuromuscular Re-ed / Therapeutic Activities                                                 Manual Intervention       STM  Along extensor pollicis longus        kinesio tape                                       Modalities:ultrasound over radial aspect of forearm sensory setting. Patient education:  ABHI Martini, moose Feliciano's, HEP- pt verbalized understanding      Home Exercise Program:  Written and verbal HEP instructions provided and reviewed:  · Use of neoprene thumb spica splint for support, inflammation benefits, and pain relief   · Thumb opposition, abduction, and extension   · Wrist flexion/extension and grading force during grasp to reduce extrinsic tightness    Therapeutic Exercise and NMR:  [x] (69519) Provided verbal/tactile cueing for activities related to strengthening, flexibility, endurance, ROM  for improvements in scapular, scapulothoracic and UE control with self care, reaching, carrying, lifting, house/yardwork, driving/computer work.    [] (05858) Provided verbal/tactile cueing for activities related to improving balance, coordination, kinesthetic sense, posture, motor skill, proprioception  to assist with  scapular, scapulothoracic and UE control with self care, reaching, carrying, lifting, house/yardwork, driving/computer work.   [] Comments:    Therapeutic Activities:    [] (58704 or comments)  [] Plan of care initiated (MD cosigned) [] Hold pending MD visit [] Discharge    Electronically signed by:        Note: If patient does not return for scheduled/ recommended follow up visits, this note will serve as a discharge from care along with most recent update on progress.

## 2021-05-13 ENCOUNTER — HOSPITAL ENCOUNTER (OUTPATIENT)
Dept: OCCUPATIONAL THERAPY | Age: 71
Setting detail: THERAPIES SERIES
Discharge: HOME | End: 2021-05-13
Payer: MEDICARE

## 2021-05-13 PROCEDURE — 97110 THERAPEUTIC EXERCISES: CPT

## 2021-05-13 PROCEDURE — 97035 APP MDLTY 1+ULTRASOUND EA 15: CPT

## 2021-05-13 PROCEDURE — 97140 MANUAL THERAPY 1/> REGIONS: CPT

## 2021-05-13 NOTE — FLOWSHEET NOTE
[facility] - Outpatient Occupational Therapy      Hand Therapy Daily Treatment Note  Date:  2021    Patient: Josesito De La Cruz \"Marychuy\" or \"Cat\"    : 1950  MRN: [id number]  Referring Physician:   Dr. Chel Bull MD         Medical Diagnosis Information:  K93.711 (ICD-10-CM) - Left wrist pain  M65.4 (ICD-10-CM) - Elihue Hosteller Quervain's tenosynovitis, left                                               Insurance information:   [place] and [employer]- [place], [place]  Date of Injury: s/s ~1 month ago  Date of Surgery: n/a    Progress Report: []  Yes  [x]  No     Date Range for reporting period:  Beginnin21   Ending: end of POC    Progress report due (10 Rx/or 30 days whichever is less): visit #10 or 3/47/48    Recertification due (POC duration/ or 90 days whichever is less): visit #10 or 21    Visit # Insurance Allowable Auth required? Date Range   6/10 [place] []  Yes  [x]  No n/a       Latex Allergy:  [x]No      []Yes  Pacemaker:  [x] No       [] Yes     Preferred Language for Healthcare:   [x]English       []other:    Pain level: 4/10  aliconniee is helping 1 two times a day, patient feels that the US is helping too    SUBJECTIVE:   Patient is doing the paraffin bath almost every morning     Functional Disability Index: Quick DASH: total score- 34, disability score- 52.27%    OBJECTIVE: See eval      RESTRICTIONS/PRECAUTIONS: none noted on order    Exercises/Interventions: Treatment focused on decreasing pain and increasing strength to improve functional use of L hand. Session initiated with soft tissue mobilization along EPL. Patient then rec'd ultrasound to fibrotic tissue radial aspect of forearm. Therapist then kinesiotaped thumb and forearm for myofascial support and mechanical correction for dequervains. Patient then worked on functional strenthening with use of  heco stix. Patient worked on reciprocal wrist flexion/ extension, radial deviation, pronation/ supination.   No pain and reported she felt very good when leaving treatment on this date. Therapeutic Exercises  Resistance / level Sets/sec Reps Notes         Short arc wrist flexion/extension 1# 1 8    Short arc radial/ulnar deviation 1# 1 8    Short arc pronation/supination 1# 1 8                                              Neuromuscular Re-ed / Therapeutic Activities                                                 Manual Intervention       STM  Along extensor pollicis longus        kinesio tape                                       Modalities:ultrasound over radial aspect of forearm sensory setting. Patient education:  Eval, POC, de Quervain's, HEP- pt verbalized understanding      Home Exercise Program:  Written and verbal HEP instructions provided and reviewed:  · Use of neoprene thumb spica splint for support, inflammation benefits, and pain relief   · Thumb opposition, abduction, and extension   · Wrist flexion/extension and grading force during grasp to reduce extrinsic tightness    Therapeutic Exercise and NMR:  [x] (85493) Provided verbal/tactile cueing for activities related to strengthening, flexibility, endurance, ROM  for improvements in scapular, scapulothoracic and UE control with self care, reaching, carrying, lifting, house/yardwork, driving/computer work.    [] (96203) Provided verbal/tactile cueing for activities related to improving balance, coordination, kinesthetic sense, posture, motor skill, proprioception  to assist with  scapular, scapulothoracic and UE control with self care, reaching, carrying, lifting, house/yardwork, driving/computer work.   [] Comments:    Therapeutic Activities:    [] (25168 or 93879) Provided verbal/tactile cueing for activities related to improving balance, coordination, kinesthetic sense, posture, motor skill, proprioception and motor activation to allow for proper function of scapular, scapulothoracic and UE control with self care, carrying, lifting, Other:ultrasound  GOALS:   Patient stated goal: decrease pain, regain function  [x]? Progressing: []? Met: []? Not Met: []? Adjusted     Therapist goals for Patient:   Short Term Goals: To be achieved in: 30 days  1. Pt will increase hand  strength by at least 5 pounds to improve functional strength and control in IADLs by 5/21/21. [x]? Progressing: []? Met: []? Not Met: []? Adjusted  2. Pt will decrease pain and increase functional strength to complete cutting, stirring, and lifting tasks required for meal preparation by 5/21/21. [x]? Progressing: []? Met: []? Not Met: []? Adjusted     Long Term Goals: To be achieved by discharge  1. Pt will report a QuickDASH Symptom Severity Scale score of 40% or less indicating increased safety and functional independence in desired occupational pursuits by discharge. [x]? Progressing: []? Met: []? Not Met: []? Adjusted    Progression Towards Functional goals:  [x] Patient is progressing as expected towards functional goals listed. [] Progression is slowed due to complexities listed. [] Progression has been slowed due to co-morbidities. [] Plan just implemented, too soon to assess goals progression  [] All goals are met  [] Other:     ASSESSMENT:  Pt has made improvements in pain-free strength and ROM with some continued mild pain at base of thumb.      Treatment/Activity Tolerance:  [x] Patient tolerated treatment well [] Patient limited by fatique  [] Patient limited by pain  [] Patient limited by other medical complications  [] Other:     Prognosis: [x] Good [] Fair  [] Poor    Patient Requires Follow-up: [x] Yes  [] No    PLAN: See eval  [x] Continue per plan of care [] Alter current plan (see comments)  [] Plan of care initiated (MD cosigned) [] Hold pending MD visit [] Discharge    Electronically signed by:        Note: If patient does not return for scheduled/ recommended follow up visits, this note will serve as a discharge from care along with most recent update on progress.

## 2021-05-21 ENCOUNTER — HOSPITAL ENCOUNTER (OUTPATIENT)
Dept: OCCUPATIONAL THERAPY | Age: 71
Setting detail: THERAPIES SERIES
Discharge: HOME | End: 2021-05-21
Payer: MEDICARE

## 2021-05-21 PROCEDURE — 97035 APP MDLTY 1+ULTRASOUND EA 15: CPT | Performed by: OCCUPATIONAL THERAPIST

## 2021-05-21 PROCEDURE — 97140 MANUAL THERAPY 1/> REGIONS: CPT | Performed by: OCCUPATIONAL THERAPIST

## 2021-05-21 PROCEDURE — 97110 THERAPEUTIC EXERCISES: CPT | Performed by: OCCUPATIONAL THERAPIST

## 2021-05-21 NOTE — FLOWSHEET NOTE
[facility] - Outpatient Occupational Therapy      Hand Therapy Daily Treatment Note  Date:  2021    Patient: Ezequiel Irby \"Marychuy\" or \"Cat\"    : 1950  MRN: [id number]  Referring Physician:   Dr. Julio Caraballo MD         Medical Diagnosis Information:  M00.299 (ICD-10-CM) - Left wrist pain  M65.4 (ICD-10-CM) - Kenan Whitfield Quervain's tenosynovitis, left                                               Insurance information:   [place]ic[place] and [employer]- [place], [place] auth  Date of Injury: s/s ~1 month ago  Date of Surgery: n/a    Progress Report: []  Yes  [x]  No     Date Range for reporting period:  Beginnin21   Ending: end of POC    Progress report due (10 Rx/or 30 days whichever is less): visit #10 or     Recertification due (POC duration/ or 90 days whichever is less): visit #10 or 21    Visit # Insurance Allowable Auth required? Date Range   7/10 [place] []  Yes  [x]  No n/a       Latex Allergy:  [x]No      []Yes  Pacemaker:  [x] No       [] Yes     Preferred Language for Healthcare:   [x]English       []other:    Pain level: 4/10  alieve is helping 1 two times a day, patient feels that the US is helping too    SUBJECTIVE:   Patient is doing the paraffin bath almost every morning. Pain with reaching behind back to wash, retrieve seatbelt. Reports quite sore after last therapy session, but better later that same day. Functional Disability Index: Quick DASH: total score- 34, disability score- 52.27%    OBJECTIVE: See eval      RESTRICTIONS/PRECAUTIONS: none noted on order    Exercises/Interventions: Treatment focused on decreasing pain and increasing strength to improve functional use of L hand. Session initiated with soft tissue mobilization along EPL. Patient then rec'd ultrasound to fibrotic tissue radial aspect of forearm. Therapist then kinesiotaped thumb and forearm for myofascial support and mechanical correction for dequervains.   Patient then worked on functional strenthening with use of  heco stix. Patient worked on reciprocal wrist flexion/ extension, radial deviation, pronation/ supination. No pain and reported she felt very good when leaving treatment on this date. Therapeutic Exercises  Resistance / level Sets/sec Reps Notes         Short arc wrist flexion/extension 1# 1 8    Short arc radial/ulnar deviation 1# 1 8    Short arc pronation/supination 1# 1 8           Wrist extensors - eccentric work  2 10 Red flexbar          Rotation - grab and twist   15 Yellow powerweb   Ulnar deviation - mallet  2 10 Mid-handle           Neuromuscular Re-ed / Therapeutic Activities                                                 Manual Intervention       STM  Along extensor pollicis longus        kinesio tape    radial wrist                                   Modalities:ultrasound over radial aspect of forearm sensory setting. Patient education:  ABHI Martini, moose Feliciano's, HEP- pt verbalized understanding      Home Exercise Program:  Written and verbal HEP instructions provided and reviewed:  · Use of neoprene thumb spica splint for support, inflammation benefits, and pain relief   · Thumb opposition, abduction, and extension   · Wrist flexion/extension and grading force during grasp to reduce extrinsic tightness    Therapeutic Exercise and NMR:  [x] (44586) Provided verbal/tactile cueing for activities related to strengthening, flexibility, endurance, ROM  for improvements in scapular, scapulothoracic and UE control with self care, reaching, carrying, lifting, house/yardwork, driving/computer work.    [] (66126) Provided verbal/tactile cueing for activities related to improving balance, coordination, kinesthetic sense, posture, motor skill, proprioception  to assist with  scapular, scapulothoracic and UE control with self care, reaching, carrying, lifting, house/yardwork, driving/computer work.   [] Comments:    Therapeutic Activities:    [] (03453 or 21387) Provided LGBBN(35832)  [] NMR (92900) x     [] Estim (attended) (83671)   [x] Manual ([id number]) x 1   [x] US (73244)  [] TA (01173) x     [] Paraffin (39122)  [] ADL  (73807) x    [] Splint/L code:    [] Estim (unattended) (41762)  [] Fluidotherapy (40738)  [x] Other:ultrasound  GOALS:   Patient stated goal: decrease pain, regain function  [x]? Progressing: []? Met: []? Not Met: []? Adjusted     Therapist goals for Patient:   Short Term Goals: To be achieved in: 30 days  1. Pt will increase hand  strength by at least 5 pounds to improve functional strength and control in IADLs by 5/21/21. [x]? Progressing: []? Met: []? Not Met: []? Adjusted  2. Pt will decrease pain and increase functional strength to complete cutting, stirring, and lifting tasks required for meal preparation by 5/21/21. [x]? Progressing: []? Met: []? Not Met: []? Adjusted     Long Term Goals: To be achieved by discharge  1. Pt will report a QuickDASH Symptom Severity Scale score of 40% or less indicating increased safety and functional independence in desired occupational pursuits by discharge. [x]? Progressing: []? Met: []? Not Met: []? Adjusted    Progression Towards Functional goals:  [x] Patient is progressing as expected towards functional goals listed. [] Progression is slowed due to complexities listed. [] Progression has been slowed due to co-morbidities. [] Plan just implemented, too soon to assess goals progression  [] All goals are met  [] Other:     ASSESSMENT:  Pt has made improvements in pain-free strength and ROM with some continued mild pain at base of thumb.      Treatment/Activity Tolerance:  [x] Patient tolerated treatment well [] Patient limited by fatique  [] Patient limited by pain  [] Patient limited by other medical complications  [] Other:     Prognosis: [x] Good [] Fair  [] Poor    Patient Requires Follow-up: [x] Yes  [] No    PLAN: See eval  [x] Continue per plan of care [] Alter current plan (see comments)  [] Plan of care initiated (MD cosigned) [] Hold pending MD visit [] Discharge    Electronically signed by:    Soha Forde OTR/L, [address] [facility]      Note: If patient does not return for scheduled/ recommended follow up visits, this note will serve as a discharge from care along with most recent update on progress.

## 2021-05-28 ENCOUNTER — HOSPITAL ENCOUNTER (OUTPATIENT)
Dept: OCCUPATIONAL THERAPY | Age: 71
Setting detail: THERAPIES SERIES
Discharge: HOME | End: 2021-05-28
Payer: MEDICARE

## 2021-05-28 PROCEDURE — 97110 THERAPEUTIC EXERCISES: CPT | Performed by: OCCUPATIONAL THERAPIST

## 2021-05-28 PROCEDURE — 97140 MANUAL THERAPY 1/> REGIONS: CPT | Performed by: OCCUPATIONAL THERAPIST

## 2021-05-28 PROCEDURE — 97035 APP MDLTY 1+ULTRASOUND EA 15: CPT | Performed by: OCCUPATIONAL THERAPIST

## 2021-05-28 NOTE — FLOWSHEET NOTE
[facility] - Outpatient Occupational Therapy      Hand Therapy Daily Treatment Note  Date:  2021    Patient: Consuelo Ansari \"Marychuy\" or \"Cat\"    : 1950  MRN: [id number]  Referring Physician:   Dr. Jad Grace MD         Medical Diagnosis Information:  R50.261 (ICD-10-CM) - Left wrist pain  M65.4 (ICD-10-CM) - Katheren Dwayne Quervain's tenosynovitis, left                                               Insurance information:   [place] [employer]- [place], no auth  Date of Injury: s/s ~1 month ago  Date of Surgery: n/a    Progress Report: []  Yes  [x]  No     Date Range for reporting period:  Beginnin21   Ending: end of POC    Progress report due (10 Rx/or 30 days whichever is less): visit #10 or     Recertification due (POC duration/ or 90 days whichever is less): visit #10 or 21    Visit # Insurance Allowable Auth required? Date Range   9/10 [place] []  Yes  [x]  No n/a       Latex Allergy:  [x]No      []Yes  Pacemaker:  [x] No       [] Yes     Preferred Language for Healthcare:   [x]English       []other:    Pain level: 4/10  Aleve is helping, but not taking it every day. SUBJECTIVE:   Reports HEP; continues with some aggravation of wrist, mostly with wrist flexed (bra donning). Splint wear most of the time when at home. Sleeping in wrist wrap. Paraffin use 1x/day      Functional Disability Index: Quick DASH: total score- 34, disability score- 52.27%    OBJECTIVE: See eval      RESTRICTIONS/PRECAUTIONS: none noted on order    Exercises/Interventions: Treatment focused on decreasing pain and increasing strength to improve functional use of L hand. Session initiated with soft tissue mobilization along EPL. Patient then rec'd ultrasound to fibrotic tissue radial aspect of forearm. Pt requested no taping today due to some itching/bumpiness under tape after last application.     Therapeutic Exercises  Resistance / level Sets/sec Reps Notes         Short arc wrist flexion/extension 1# 1 8    Short arc radial/ulnar deviation 1# 1 8    Short arc pronation/supination 1# 1 8           Wrist extensors - eccentric work  2 15 Red flexbar   Rotation   15 Red flexbar bend   Pylometrics - flexbar shake R/U dev  2x15 sec     Rotation - grab and twist   20 Yellow powerweb   Ulnar deviation - mallet  2 10 Mid-handle           Neuromuscular Re-ed / Therapeutic Activities                                                 Manual Intervention       STM  Along extensor pollicis longus     EPL, EDC massage   kinesio tape                                       Modalities:ultrasound over radial aspect of forearm/CMCJ, 1.4w, 50%. Patient education:  Eval, POC, de Quervain's, HEP- pt verbalized understanding      Home Exercise Program:  Written and verbal HEP instructions provided and reviewed:  · Use of neoprene thumb spica splint for support, inflammation benefits, and pain relief   · Thumb opposition, abduction, and extension   · Wrist flexion/extension and grading force during grasp to reduce extrinsic tightness    Therapeutic Exercise and NMR:  [x] (04886) Provided verbal/tactile cueing for activities related to strengthening, flexibility, endurance, ROM  for improvements in scapular, scapulothoracic and UE control with self care, reaching, carrying, lifting, house/yardwork, driving/computer work.    [] (23949) Provided verbal/tactile cueing for activities related to improving balance, coordination, kinesthetic sense, posture, motor skill, proprioception  to assist with  scapular, scapulothoracic and UE control with self care, reaching, carrying, lifting, house/yardwork, driving/computer work.   [] Comments:    Therapeutic Activities:    [] (47384 or 93199) Provided verbal/tactile cueing for activities related to improving balance, coordination, kinesthetic sense, posture, motor skill, proprioception and motor activation to allow for proper function of scapular, scapulothoracic and UE control with self care, carrying, lifting, driving/computer work  [] Comments:    Home Exercise Program:    [x] (47826) Reviewed/Progressed HEP activities related to strengthening, flexibility, endurance, ROM of scapular, scapulothoracic and UE control with self care, reaching, carrying, lifting, house/yardwork, driving/computer work  [] (81834) Reviewed/Progressed HEP activities related to improving balance, coordination, kinesthetic sense, posture, motor skill, proprioception of scapular, scapulothoracic and UE control with self care, reaching, carrying, lifting, house/yardwork, driving/computer work    [] Comments:    Manual Treatments:  PROM / STM / Oscillations-Mobs:  G-I, II, III, IV (PA's, Inf., Post.)  [x] (63760) Provided manual therapy to mobilize soft tissue/joints of cervical/CT, scapular GHJ and UE for the purpose of modulating pain, promoting relaxation,  increasing ROM, reducing/eliminating soft tissue swelling/inflammation/restriction, improving soft tissue extensibility and allowing for proper ROM for normal function with self care, reaching, carrying, lifting, house/yardwork, driving/computer work  [] Comments:    ADL Training:  [] (10903) Provided self-care/home management training related to activities of daily living and compensatory training, and/or use of adaptive equipment   [] Comments:     Splinting:  [] Fabrication of:   [] (15701) Orthotic/Prosthetic Management, subsequent encounter  [] (13780) Orthotic management and training (fitting and assessment)  [] Comments:      Charges:  Timed Code Treatment Minutes: 45   Total Treatment Minutes:  45     [] EVAL (LOW) 14907   [] OT Re-eval (37815)  [] EVAL (MOD) 50622   [] EVAL (HIGH) 14172       [x] Boby (15411) x 1  [] YFELR(55437)  [] NMR (84133) x     [] Estim (attended) (45406)   [x] Manual ([id number]) x 1   [x] US (19167)  [] TA (72062) x     [] Paraffin (49093)  [] ADL  (88 [phone]) x    [] Splint/L code:    [] Estim (unattended) (22 [id number])  [] Fluidotherapy (59912)  [] Other:    GOALS:   Patient stated goal: decrease pain, regain function  [x]? Progressing: []? Met: []? Not Met: []? Adjusted     Therapist goals for Patient:   Short Term Goals: To be achieved in: 30 days  1. Pt will increase hand  strength by at least 5 pounds to improve functional strength and control in IADLs by 5/21/21. [x]? Progressing: []? Met: []? Not Met: []? Adjusted  2. Pt will decrease pain and increase functional strength to complete cutting, stirring, and lifting tasks required for meal preparation by 5/21/21. [x]? Progressing: []? Met: []? Not Met: []? Adjusted     Long Term Goals: To be achieved by discharge  1. Pt will report a QuickDASH Symptom Severity Scale score of 40% or less indicating increased safety and functional independence in desired occupational pursuits by discharge. [x]? Progressing: []? Met: []? Not Met: []? Adjusted    Progression Towards Functional goals:  [x] Patient is progressing as expected towards functional goals listed. [] Progression is slowed due to complexities listed. [] Progression has been slowed due to co-morbidities. [] Plan just implemented, too soon to assess goals progression  [] All goals are met  [] Other:     ASSESSMENT:  Pt has made improvements in pain-free strength and ROM with some continued mild pain at base of thumb.      Treatment/Activity Tolerance:  [x] Patient tolerated treatment well [] Patient limited by fatique  [] Patient limited by pain  [] Patient limited by other medical complications  [] Other:     Prognosis: [x] Good [] Fair  [] Poor    Patient Requires Follow-up: [x] Yes  [] No    PLAN:   [x] Continue per plan of care [] Alter current plan (see comments)  [] Plan of care initiated (MD cosigned) [] Hold pending MD visit [] Discharge    Electronically signed by:    Penny CHO/ADA, [address] [place] [address]      Note: If patient does not return for scheduled/ recommended follow up visits, this note will serve as a

## 2021-06-04 ENCOUNTER — APPOINTMENT (OUTPATIENT)
Dept: OCCUPATIONAL THERAPY | Age: 71
End: 2021-06-04
Payer: MEDICARE

## 2021-06-11 ENCOUNTER — HOSPITAL ENCOUNTER (OUTPATIENT)
Dept: OCCUPATIONAL THERAPY | Age: 71
Setting detail: THERAPIES SERIES
Discharge: HOME | End: 2021-06-11
Payer: MEDICARE

## 2021-06-11 PROCEDURE — 97140 MANUAL THERAPY 1/> REGIONS: CPT

## 2021-06-11 PROCEDURE — 97110 THERAPEUTIC EXERCISES: CPT

## 2021-06-11 NOTE — PLAN OF CARE
[facility] Occupational Therapy      Occupational Therapy Discharge Summary    Dear Dr. Faisal Jain MD,    We had the pleasure of treating the following patient for occupational therapy services at [address] [place] [address]. A summary of our findings can be found in the discharge summary below. If you have any questions or concerns regarding these findings, please do not hesitate to contact me at [phone]. Thank you for the referral.     Physician Signature:________________________________Date:__________________  By signing above (or electronic signature), therapists plan is approved by physician      Functional Outcome:     : Quick DASH: total score- 34, disability score- 52.27%    : Quick DASH: total score- 16, disability score- 11.36%    Overall Response to Treatment:   [x]Patient is responding well to treatment and improvement is noted with regards to goals   []Patient should continue to improve in reasonable time if they continue HEP   []Patient has plateaued and is no longer responding to skilled OT intervention    []Patient is getting worse and would benefit from return to referring MD   []Patient unable to adhere to initial POC   []Other:     Date range of Visits: 21-21  Total Visits: 10    Recommendation:    [x] Discharge to [facility]. Follow up with OT PRN.       Hand Therapy Daily Treatment Note  Date:  2021    Patient: Mario Grimes \"Marychuy\" or \"Cat\"    : 1950  MRN: [id number]  Referring Physician:   Dr. Faisal Jain MD         Medical Diagnosis Information:  P86.226 (ICD-10-CM) - Left wrist pain  M65.4 (ICD-10-CM) - Sophia Outlaw Quervain's tenosynovitis, left                                               Insurance information:   [place] [employer]- [place], no auth  Date of Injury: s/s ~1 month ago  Date of Surgery: n/a    Progress Report: [x]  Yes  []  No     Date Range for reporting period: Beginnin21   Ending: end of POC    Progress report due (10 Rx/or 30 days whichever is less): visit #10 or     Recertification due (POC duration/ or 90 days whichever is less): visit #10 or 21    Visit # Insurance Allowable Auth required? Date Range   10/10 [place] []  Yes  [x]  No n/a       Latex Allergy:  [x]No      []Yes  Pacemaker:  [x] No       [] Yes     Preferred Language for Healthcare:   [x]English       []other:    Pain level: 0/10    SUBJECTIVE:   Pt reports she has not needed to take aleve due to very low intensity and frequency of pain this week. Pt reports she traveled [facility] without issue. Pt reports she is at 80% progress and feels confident to d/c to continue HEP. Functional Disability Index: Quick DASH: total score- 34, disability score- 52.27%    : Quick DASH: total score- 16, disability score- 11.36%    OBJECTIVE:     Hand Assessment Left  Right  Comments     Strength (lbs) 39  : 44 48  : 36        RESTRICTIONS/PRECAUTIONS: none noted on order    Exercises/Interventions: Treatment focused on successful transition to independent HEP to continue progress in pain relief, stability, and improved functional use. Session initiated with assessment of pt progress with subjective and objective measures noted above, and progress with goals noted below with all goals met at this time. Pt received STM along EPL and into extensor wad, followed by stretching into wrist flexion/extension with improved comfort following. Reviewed pt's HEP with pt demonstrating correct form and verbalizing understanding of transition to independent HEP. Pt educated on notifying physician if symptoms return or worsen with pt verbalizing understanding. Pt discharged.      Therapeutic Exercises  Resistance / level Sets/sec Reps Notes                Neuromuscular Re-ed / Therapeutic Activities                                                 Manual Intervention       STM Modalities:ultrasound over radial aspect of forearm/CMCJ, 1.4w, 50%. Patient education:  ABHI Martini, de Braden's, HEP- pt verbalized understanding      Home Exercise Program:  Written and verbal HEP instructions provided and reviewed:  · Use of neoprene thumb spica splint for support, inflammation benefits, and pain relief   · Thumb opposition, abduction, and extension   · Wrist flexion/extension and grading force during grasp to reduce extrinsic tightness    Therapeutic Exercise and NMR:  [x] (46699) Provided verbal/tactile cueing for activities related to strengthening, flexibility, endurance, ROM  for improvements in scapular, scapulothoracic and UE control with self care, reaching, carrying, lifting, house/yardwork, driving/computer work.    [] (07252) Provided verbal/tactile cueing for activities related to improving balance, coordination, kinesthetic sense, posture, motor skill, proprioception  to assist with  scapular, scapulothoracic and UE control with self care, reaching, carrying, lifting, house/yardwork, driving/computer work.   [] Comments:    Therapeutic Activities:    [] (93869 or 23846) Provided verbal/tactile cueing for activities related to improving balance, coordination, kinesthetic sense, posture, motor skill, proprioception and motor activation to allow for proper function of scapular, scapulothoracic and UE control with self care, carrying, lifting, driving/computer work  [] Comments:    Home Exercise Program:    [x] (44184) Reviewed/Progressed HEP activities related to strengthening, flexibility, endurance, ROM of scapular, scapulothoracic and UE control with self care, reaching, carrying, lifting, house/yardwork, driving/computer work  [] (76553) Reviewed/Progressed HEP activities related to improving balance, coordination, kinesthetic sense, posture, motor skill, proprioception of scapular, scapulothoracic and UE control with self care, reaching, carrying, lifting, house/yardwork, driving/computer work    [] Comments:    Manual Treatments:  PROM / STM / Oscillations-Mobs:  G-I, II, III, IV (PA's, Inf., Post.)  [x] (13805) Provided manual therapy to mobilize soft tissue/joints of cervical/CT, scapular GHJ and UE for the purpose of modulating pain, promoting relaxation,  increasing ROM, reducing/eliminating soft tissue swelling/inflammation/restriction, improving soft tissue extensibility and allowing for proper ROM for normal function with self care, reaching, carrying, lifting, house/yardwork, driving/computer work  [] Comments:    ADL Training:  [] (38404) Provided self-care/home management training related to activities of daily living and compensatory training, and/or use of adaptive equipment   [] Comments:     Splinting:  [] Fabrication of:   [] (31544) Orthotic/Prosthetic Management, subsequent encounter  [] (59668) Orthotic management and training (fitting and assessment)  [] Comments:      Charges:  Timed Code Treatment Minutes: 45   Total Treatment Minutes:  45     [] EVAL (LOW) 00272   [] OT Re-eval (65476)  [] EVAL (MOD) 64118   [] EVAL (HIGH) 43427       [x] Boby (64254) x   2  [] MFOGY(43582)  [] NMR (76040) x     [] Estim (attended) (04828)   [x] Manual ([id number]) x   1  [] US (97706)  [] TA (03643) x     [] Paraffin (76050)  [] ADL  (88 649 24 60) x    [] Splint/L code:    [] Estim (unattended) (22 849649)  [] Fluidotherapy (73727)  [] Other:    GOALS:   Patient stated goal: decrease pain, regain function  []? Progressing: [x]? Met: []? Not Met: []? Adjusted     Therapist goals for Patient:   Short Term Goals: To be achieved in: 30 days  1. Pt will increase hand  strength by at least 5 pounds to improve functional strength and control in IADLs by 5/21/21. []? Progressing: [x]? Met: []? Not Met: []? Adjusted  2. Pt will decrease pain and increase functional strength to complete cutting, stirring, and lifting tasks required for meal preparation by 5/21/21.    []? Progressing: [x]? Met: []? Not Met: []? Adjusted     Long Term Goals: To be achieved by discharge  1. Pt will report a QuickDASH Symptom Severity Scale score of 40% or less indicating increased safety and functional independence in desired occupational pursuits by discharge. []? Progressing: [x]? Met: []? Not Met: []? Adjusted     Progression Towards Functional goals:  [] Patient is progressing as expected towards functional goals listed. [] Progression is slowed due to complexities listed. [] Progression has been slowed due to co-morbidities. [] Plan just implemented, too soon to assess goals progression  [x] All goals are met   [] Other:     ASSESSMENT:  Pt has made improvements in pain-free strength and ROM with some intermittent mild pain at base of thumb during certain tasks. Pt with improved overall function and ready to d/c to HEP. Treatment/Activity Tolerance:  [x] Patient tolerated treatment well [] Patient limited by fatique  [] Patient limited by pain  [] Patient limited by other medical complications  [] Other:     Prognosis: [x] Good [] Fair  [] Poor    Patient Requires Follow-up: [] Yes  [x] No    PLAN:   [] Continue per plan of care [] Alter current plan (see comments)  [] Plan of care initiated (MD cosigned) [] Hold pending MD visit [x] Discharge    Electronically signed by:    MARQUIS Apodaca/L [id number]      Note: If patient does not return for scheduled/ recommended follow up visits, this note will serve as a discharge from care along with most recent update on progress.

## 2021-08-19 ENCOUNTER — TELEPHONE (OUTPATIENT)
Dept: FAMILY MEDICINE CLINIC | Age: 71
End: 2021-08-19

## 2021-08-19 ENCOUNTER — OFFICE VISIT (OUTPATIENT)
Dept: FAMILY MEDICINE CLINIC | Age: 71
End: 2021-08-19
Payer: MEDICARE

## 2021-08-19 VITALS
DIASTOLIC BLOOD PRESSURE: 82 MMHG | HEART RATE: 84 BPM | HEIGHT: 65 IN | BODY MASS INDEX: 28.79 KG/M2 | TEMPERATURE: 96.8 F | SYSTOLIC BLOOD PRESSURE: 132 MMHG | WEIGHT: 172.8 LBS

## 2021-08-19 DIAGNOSIS — E11.9 TYPE 2 DIABETES MELLITUS WITHOUT COMPLICATION, WITHOUT LONG-TERM CURRENT USE OF INSULIN (HCC): ICD-10-CM

## 2021-08-19 DIAGNOSIS — R09.89 BRUIT OF RIGHT CAROTID ARTERY: Primary | ICD-10-CM

## 2021-08-19 DIAGNOSIS — F17.200 SMOKER UNMOTIVATED TO QUIT: ICD-10-CM

## 2021-08-19 DIAGNOSIS — Z00.00 ROUTINE GENERAL MEDICAL EXAMINATION AT A HEALTH CARE FACILITY: ICD-10-CM

## 2021-08-19 LAB
BILIRUBIN URINE: NEGATIVE
BLOOD, URINE: NEGATIVE
CLARITY: CLEAR
COLOR: YELLOW
GLUCOSE URINE: NEGATIVE MG/DL
KETONES, URINE: NEGATIVE MG/DL
LEUKOCYTE ESTERASE, URINE: NEGATIVE
MICROSCOPIC EXAMINATION: NORMAL
NITRITE, URINE: NEGATIVE
PH UA: 6.5 (ref 5–8)
PROTEIN UA: NEGATIVE MG/DL
SPECIFIC GRAVITY UA: 1.01 (ref 1–1.03)
URINE REFLEX TO CULTURE: NORMAL
URINE TYPE: NORMAL
UROBILINOGEN, URINE: 0.2 E.U./DL

## 2021-08-19 PROCEDURE — 1123F ACP DISCUSS/DSCN MKR DOCD: CPT | Performed by: NURSE PRACTITIONER

## 2021-08-19 PROCEDURE — 4040F PNEUMOC VAC/ADMIN/RCVD: CPT | Performed by: NURSE PRACTITIONER

## 2021-08-19 PROCEDURE — 3017F COLORECTAL CA SCREEN DOC REV: CPT | Performed by: NURSE PRACTITIONER

## 2021-08-19 PROCEDURE — 3046F HEMOGLOBIN A1C LEVEL >9.0%: CPT | Performed by: NURSE PRACTITIONER

## 2021-08-19 PROCEDURE — G0439 PPPS, SUBSEQ VISIT: HCPCS | Performed by: NURSE PRACTITIONER

## 2021-08-19 ASSESSMENT — PATIENT HEALTH QUESTIONNAIRE - PHQ9
2. FEELING DOWN, DEPRESSED OR HOPELESS: 0
SUM OF ALL RESPONSES TO PHQ QUESTIONS 1-9: 0
1. LITTLE INTEREST OR PLEASURE IN DOING THINGS: 0
SUM OF ALL RESPONSES TO PHQ QUESTIONS 1-9: 0
SUM OF ALL RESPONSES TO PHQ QUESTIONS 1-9: 0
SUM OF ALL RESPONSES TO PHQ9 QUESTIONS 1 & 2: 0

## 2021-08-19 NOTE — TELEPHONE ENCOUNTER
Central Scheduling called and stated the US carotid needs to be changed to  carotid. Please call pt when then has been changed.

## 2021-08-19 NOTE — PATIENT INSTRUCTIONS
Personalized Preventive Plan for Mg Henderson - 8/19/2021  Medicare offers a range of preventive health benefits. Some of the tests and screenings are paid in full while other may be subject to a deductible, co-insurance, and/or copay. Some of these benefits include a comprehensive review of your medical history including lifestyle, illnesses that may run in your family, and various assessments and screenings as appropriate. After reviewing your medical record and screening and assessments performed today your provider may have ordered immunizations, labs, imaging, and/or referrals for you. A list of these orders (if applicable) as well as your Preventive Care list are included within your After Visit Summary for your review. Other Preventive Recommendations:    · A preventive eye exam performed by an eye specialist is recommended every 1-2 years to screen for glaucoma; cataracts, macular degeneration, and other eye disorders. · A preventive dental visit is recommended every 6 months. · Try to get at least 150 minutes of exercise per week or 10,000 steps per day on a pedometer . · Order or download the FREE \"Exercise & Physical Activity: Your Everyday Guide\" from [employer]. Call 9-757.891.4701 or search [employer] online. · You need 5140-8129 mg of calcium and 8329-4919 IU of vitamin D per day. It is possible to meet your calcium requirement with diet alone, but a vitamin D supplement is usually necessary to meet this goal.  · When exposed to the sun, use a sunscreen that protects against both UVA and UVB radiation with an SPF of 30 or greater. Reapply every 2 to 3 hours or after sweating, drying off with a towel, or swimming. · Always wear a seat belt when traveling in a car. Always wear a helmet when riding a bicycle or motorcycle.

## 2021-08-19 NOTE — PROGRESS NOTES
Tomy Peoples (:  1950) is a 70 y.o. female,Established patient, here for evaluation of the following chief complaint(s): Other (f/u after eye doctor appt )      ASSESSMENT/PLAN:  1. Bruit of right carotid artery  -     US CAROTID ARTERY BILATERAL; Future  2. Type 2 diabetes mellitus without complication, without long-term current use of insulin ([address]  Assessment & Plan:   At goal, continue current medications  Orders:  -     LIPID PANEL; Future  -     COMPREHENSIVE METABOLIC PANEL; Future  -     CBC WITH AUTO DIFFERENTIAL; Future  -     HEMOGLOBIN A1C; Future  -     TSH WITH REFLEX TO FT4; Future  3. Routine general medical examination at a health care facility  4. Smoker unmotivated to quit  -     URINE RT REFLEX TO CULTURE      Return for Medicare Annual Wellness Visit in 1 year. SUBJECTIVE/OBJECTIVE:  Sent here by eye dr after having an eye exam with a positive finding in rt eye. Requesting a carotid US. Has bilateral hamstring cramping many mornings. She has a stretch to relieve the discomfort. She is unsure if this was caused by her low back or is a result of how she is sleeping. She does have a fairly new mattress feels well supported by it. Additionally if she sitting in an uncomfortable chair finds that it makes her legs ache and get tingly. She has put on about 10 pounds in the past year and she is aware of this.       Current Outpatient Medications   Medication Sig Dispense Refill    Misc Natural Products (CHROMIUM PICOLINATE FORTIFIED PO) Take 250 mcg by mouth daily      blood glucose test strips (FREESTYLE LITE) strip USE TO CHECK GLUCOSE TWICE DAILY  E11.65 100 each 5    glucose monitoring kit (FREESTYLE) monitoring kit 1 kit by Does not apply route 2 times daily Dx: E11.65 1 kit 0    FREESTYLE LANCETS MISC 1 each by Does not apply route 2 times daily Dx: E11.65 100 each 3    vitamin B-12 (CYANOCOBALAMIN) 1000 MCG tablet Take 1,000 mcg by mouth daily      Calcium-Magnesium-Vitamin D (CALCIUM 500 PO) Take by mouth      vitamin D-3 (CHOLECALCIFEROL) 5000 UNITS TABS Take 5,000 Units by mouth daily. No current facility-administered medications for this visit. Review of Systems   All other systems reviewed and are negative. Vitals:    21 0844   BP: 132/82   Pulse: 84   Temp: 96.8 °F (36 °C)   Weight: 172 lb 12.8 oz (78.4 kg)   Height: 5' 5\" (1.651 m)       Physical Exam  Constitutional:       Appearance: She is well-developed. HENT:      Head: Normocephalic. Eyes:      Pupils: Pupils are equal, round, and reactive to light. Cardiovascular:      Rate and Rhythm: Normal rate and regular rhythm. Heart sounds: Normal heart sounds. Pulmonary:      Effort: Pulmonary effort is normal.      Breath sounds: Normal breath sounds. Musculoskeletal:         General: Normal range of motion. Cervical back: Normal range of motion. Skin:     General: Skin is warm and dry. Neurological:      Mental Status: She is alert and oriented to person, place, and time. An electronic signature was used to authenticate this note. --Prasanth Olivares, APRN - CNP     Medicare Annual Wellness Visit  Name: Celeste Belle Date: 2021   MRN: [id number] Sex: Female   Age: 70 y.o. Ethnicity: Non- / Non    : 1950 Race: Osiel Parisi / African American      Mariiaart Janeth is here for Other (f/u after eye doctor appt )    Screenings for behavioral, psychosocial and functional/safety risks, and cognitive dysfunction are all negative except as indicated below. These results, as well as other patient data from the [employer] form, are documented in Flowsheets linked to this Encounter.     Allergies   Allergen Reactions    Glucosamine Chondroitin Joint [Glucos-Chondroit-Hyaluron-Msm] Itching     Use liquid form- had itching and metallic taste in mouth    Relafen [Nabumetone] Itching and Swelling Prior to Visit Medications    Medication Sig Taking? Authorizing Provider   Misc Natural Products (CHROMIUM PICOLINATE FORTIFIED PO) Take 250 mcg by mouth daily Yes Historical Provider, MD   blood glucose test strips (FREESTYLE LITE) strip USE TO CHECK GLUCOSE TWICE DAILY  E11.65 Yes Cira Martinez MD   glucose monitoring kit (FREESTYLE) monitoring kit 1 kit by Does not apply route 2 times daily Dx: E11.65 Yes Cira Martinez MD   FREESTYLE LANCETS MISC 1 each by Does not apply route 2 times daily Dx: E11.65 Yes Cira Martinez MD   vitamin B-12 (CYANOCOBALAMIN) 1000 MCG tablet Take 1,000 mcg by mouth daily Yes Historical Provider, MD   Calcium-Magnesium-Vitamin D (CALCIUM 500 PO) Take by mouth Yes Historical Provider, MD   vitamin D-3 (CHOLECALCIFEROL) 5000 UNITS TABS Take 5,000 Units by mouth daily.  Yes Historical Provider, MD         Past Medical History:   Diagnosis Date    Arthritis     Bile induced gastritis     Chest pain     VENECIA--neg lexiscan    Chronic back pain     Colorectal polyps     Diabetes mellitus type 2 in obese ([address] 2018    Diverticulosis     GERD (gastroesophageal reflux disease)     Helicobacter pylori gastritis 2012    Postmenopausal bleeding 2010    Pregnancy     2 children - vaginal deliveries    Spinal stenosis     Thyromegaly 10/28/2020       Past Surgical History:   Procedure Laterality Date    CARPAL TUNNEL RELEASE      CHOLECYSTECTOMY      COLONOSCOPY  2016    q5y    MENISCECTOMY  2008    L knee    TUBAL LIGATION  1985         Family History   Problem Relation Age of Onset    Diabetes Mother     Hypertension Mother     Diabetes Father     Hypertension Father     Diabetes Sister     Hypertension Sister     Lung Cancer Sister          at 40    Cancer Other         breast/colon cancer    Diabetes Sister     Thyroid Disease Sister     Other Sister         dementia    Other Brother 79        abdominal problems/AAA? CareTeam (Including outside providers/suppliers regularly involved in providing care):   Patient Care Team:  Yogesh Otero MD as PCP - General (Family Medicine)  Yogesh Otero MD as PCP [place] [facility] [facility]  Tony Graham MD as Consulting Physician (Otolaryngology)    Wt Readings from Last 3 Encounters:   08/19/21 172 lb 12.8 oz (78.4 kg)   04/15/21 170 lb (77.1 kg)   10/28/20 158 lb 12.8 oz (72 kg)     Vitals:    08/19/21 0844   BP: 132/82   Pulse: 84   Temp: 96.8 °F (36 °C)   Weight: 172 lb 12.8 oz (78.4 kg)   Height: 5' 5\" (1.651 m)     Body mass index is 28.76 kg/m². Based upon direct observation of the patient, evaluation of cognition reveals recent and remote memory intact.     General Appearance: alert and oriented to person, place and time, well developed and well- nourished, in no acute distress  Skin: warm and dry, no rash or erythema  Head: normocephalic and atraumatic  Eyes: pupils equal, round, and reactive to light, extraocular eye movements intact, conjunctivae normal  ENT: tympanic membrane, external ear and ear canal normal bilaterally, nose without deformity, nasal mucosa and turbinates normal without polyps  Neck: supple and non-tender without mass, no thyromegaly or thyroid nodules, no cervical lymphadenopathy  Pulmonary/Chest: clear to auscultation bilaterally- no wheezes, rales or rhonchi, normal air movement, no respiratory distress  Cardiovascular: normal rate, regular rhythm, normal S1 and S2, no murmurs, rubs, clicks, or gallops, distal pulses intact, no carotid bruits  Abdomen: soft, non-tender, non-distended, normal bowel sounds, no masses or organomegaly  Extremities: no cyanosis, clubbing or edema  Musculoskeletal: normal range of motion, no joint swelling, deformity or tenderness  Neurologic: reflexes normal and symmetric, no cranial nerve deficit, gait, coordination and speech normal    Patient's complete Health Risk Assessment and screening values have been reviewed and are found in Flowsheets. The following problems were reviewed today and where indicated follow up appointments were made and/or referrals ordered. Positive Risk Factor Screenings with Interventions:         Substance History:  Social History     Tobacco History     Smoking Status  Current Some Day Smoker Smoking Frequency  0.1 packs/day for 25 years (2.5 pk yrs) Smoking Tobacco Type  Cigars    Smokeless Tobacco Use  Never Used    Tobacco Comment  4/25/19 precontemplation stage, smokes cigars only          Alcohol History     Alcohol Use Status  Not Currently Drinks/Week  1 Cans of beer per week Amount  1.0 standard drinks of alcohol/wk          Drug Use     Drug Use Status  Never          Sexual Activity     Sexually Active  Not Currently Partners  Male               Alcohol Screening:       A score of 8 or more is associated with harmful or hazardous drinking. A score of 13 or more in women, and 15 or more in men, is likely to indicate alcohol dependence.   Substance Abuse Interventions:  · Tobacco abuse:  tobacco cessation tips and resources provided         Personalized Preventive Plan   Current Health Maintenance Status  Immunization History   Administered Date(s) Administered    COVID-19, Moderna, PF, 100mcg/0.5mL 03/04/2021, 04/01/2021    Influenza 09/28/2011, 10/08/2013    Influenza Virus Vaccine 10/16/2014    Influenza, High Dose (Fluzone 65 yrs and older) 09/17/2015, 10/12/2018    Influenza, [place], Recombinant, IM PF (Flublok 18 yrs and older) 09/30/2020    Pneumococcal Conjugate 13-valent ([id number]) 09/17/2015    Pneumococcal Polysaccharide ([id number]) 09/28/2011, 04/02/2018    Tdap (Boostrix, Adacel) 03/12/2013    Tetanus 08/07/2008    Zoster Live (Zostavax) 10/02/2012        Health Maintenance   Topic Date Due    Diabetic retinal exam  Never done    Shingles Vaccine (2 of 3) 11/27/2012    Annual Wellness Visit (AWV)  Never done    Flu vaccine (1) 09/01/2021    Diabetic foot exam  10/28/2021    A1C test (Diabetic or Prediabetic)  10/29/2021    Diabetic microalbuminuria test  10/29/2021    Lipid screen  10/29/2021    Breast cancer screen  01/27/2023    DTaP/Tdap/Td vaccine (2 - Td or Tdap) 03/12/2023    Colon cancer screen colonoscopy  02/13/2027    DEXA (modify frequency per FRAX score)  Completed    Pneumococcal 65+ years Vaccine  Completed    COVID-19 Vaccine  Completed    Hepatitis C screen  Completed    Hepatitis A vaccine  Aged Out    Hib vaccine  Aged Out    Meningococcal (ACWY) vaccine  Aged Out     Recommendations for beBetter Health Due: see orders and patient instructions/AVS.  . Recommended screening schedule for the next 5-10 years is provided to the patient in written form: see Patient Tomasa To was seen today for other. Diagnoses and all orders for this visit:    Bruit of right carotid artery  -     US CAROTID ARTERY BILATERAL; Future    Type 2 diabetes mellitus without complication, without long-term current use of insulin ([facility]  -     LIPID PANEL; Future  -     COMPREHENSIVE METABOLIC PANEL; Future  -     CBC WITH AUTO DIFFERENTIAL; Future  -     HEMOGLOBIN A1C; Future  -     TSH WITH REFLEX TO FT4; Future    Routine general medical examination at a health care facility    Smoker unmotivated to quit  -     URINE RT REFLEX TO CULTURE                 Advance Care Planning   Advanced Care Planning: Discussed the patients choices for care and treatment in case of a health event that adversely affects decision-making abilities. Also discussed the patients long-term treatment options. Reviewed with the patient the [address] [place] [address] of [address] [place] [address] Declaration forms  Reviewed the process of designating a competent adult as an Agent (or -in-fact) that could take make health care decisions for the patient if incompetent.  Patient was asked to complete the declaration forms, either acknowledge the forms by a public notary or an eligible witness and provide a signed copy to the practice office. Time spent (minutes): 5     Cardiovascular Disease Risk Counseling: Assessed the patient's risk to develop cardiovascular disease and reviewed main risk factors. Reviewed steps to reduce disease risk including:   · Quitting tobacco use, reducing amount smoked, or not starting the habit  · Making healthy food choices  · Being physically active and gradualy increasing activity levels   · Reduce weight and determine a healthy BMI goal  · Monitor blood pressure and treat if higher than 140/90 mmHg  · Maintain blood total cholesterol levels under 5 mmol/l or 190 mg/dl  · Maintain LDL cholesterol levels under 3.0 mmol/l or 115 mg/dl   · Control blood glucose levels  · Consider taking aspirin (75 mg daily), once blood pressure is controlled   Provided a follow up plan. Time spent (minutes): 5  The 10-year ASCVD risk score (Lidia Andre, et al., 2013) is: 49.6%    Values used to calculate the score:      Age: 70 years      Sex: Female      Is Non- : Yes      Diabetic: Yes      Tobacco smoker: Yes      Systolic Blood Pressure: 205 mmHg      Is BP treated: No      HDL Cholesterol: 56 mg/dL      Total Cholesterol: 201 mg/dL   Rec smoking cessation!

## 2021-08-23 ENCOUNTER — HOSPITAL ENCOUNTER (OUTPATIENT)
Dept: VASCULAR LAB | Age: 71
Discharge: HOME | End: 2021-08-23
Payer: MEDICARE

## 2021-08-23 DIAGNOSIS — R09.89 BRUIT OF RIGHT CAROTID ARTERY: ICD-10-CM

## 2021-08-23 PROCEDURE — 93880 EXTRACRANIAL BILAT STUDY: CPT

## 2021-09-14 DIAGNOSIS — E11.9 TYPE 2 DIABETES MELLITUS WITHOUT COMPLICATION, WITHOUT LONG-TERM CURRENT USE OF INSULIN (HCC): Primary | ICD-10-CM

## 2021-09-14 RX ORDER — BLOOD-GLUCOSE METER
1 KIT MISCELLANEOUS 2 TIMES DAILY
Qty: 1 KIT | Refills: 0 | Status: SHIPPED | OUTPATIENT
Start: 2021-09-14 | End: 2021-09-15 | Stop reason: SDUPTHER

## 2021-09-15 DIAGNOSIS — E11.9 TYPE 2 DIABETES MELLITUS WITHOUT COMPLICATION, WITHOUT LONG-TERM CURRENT USE OF INSULIN (HCC): ICD-10-CM

## 2021-09-15 RX ORDER — BLOOD-GLUCOSE METER
1 KIT MISCELLANEOUS 2 TIMES DAILY
Qty: 1 KIT | Refills: 0 | OUTPATIENT
Start: 2021-09-15 | End: 2021-09-17 | Stop reason: SDUPTHER

## 2021-09-15 RX ORDER — BLOOD-GLUCOSE METER
KIT MISCELLANEOUS
Qty: 1 KIT | Refills: 0 | OUTPATIENT
Start: 2021-09-15

## 2021-09-17 DIAGNOSIS — E11.65 UNCONTROLLED TYPE 2 DIABETES MELLITUS WITH HYPERGLYCEMIA (HCC): ICD-10-CM

## 2021-09-17 DIAGNOSIS — E11.9 TYPE 2 DIABETES MELLITUS WITHOUT COMPLICATION, WITHOUT LONG-TERM CURRENT USE OF INSULIN (HCC): ICD-10-CM

## 2021-09-17 RX ORDER — BLOOD-GLUCOSE METER
1 KIT MISCELLANEOUS 2 TIMES DAILY
Qty: 1 KIT | Refills: 0 | Status: SHIPPED | OUTPATIENT
Start: 2021-09-17

## 2021-09-17 RX ORDER — BLOOD-GLUCOSE METER
KIT MISCELLANEOUS
Qty: 100 EACH | Refills: 0 | Status: SHIPPED | OUTPATIENT
Start: 2021-09-17 | End: 2021-09-27 | Stop reason: SDUPTHER

## 2021-09-27 DIAGNOSIS — E11.65 UNCONTROLLED TYPE 2 DIABETES MELLITUS WITH HYPERGLYCEMIA (HCC): ICD-10-CM

## 2021-09-27 RX ORDER — BLOOD-GLUCOSE METER
KIT MISCELLANEOUS
Qty: 100 EACH | Refills: 5 | Status: SHIPPED | OUTPATIENT
Start: 2021-09-27 | End: 2021-10-08 | Stop reason: SDUPTHER

## 2021-10-08 DIAGNOSIS — E11.65 UNCONTROLLED TYPE 2 DIABETES MELLITUS WITH HYPERGLYCEMIA (HCC): ICD-10-CM

## 2021-10-08 RX ORDER — BLOOD-GLUCOSE METER
KIT MISCELLANEOUS
Qty: 100 EACH | Refills: 5 | Status: SHIPPED | OUTPATIENT
Start: 2021-10-08

## 2021-10-15 ENCOUNTER — TELEPHONE (OUTPATIENT)
Dept: CT IMAGING | Age: 71
End: 2021-10-15

## 2021-10-15 NOTE — TELEPHONE ENCOUNTER
Patient due for annual CT Lung Screening. Reminder letter mailed.         Pete Boggs  Lung Nodule Navigator  [facility] [employer]  [phone]

## 2021-12-06 ENCOUNTER — TELEPHONE (OUTPATIENT)
Dept: FAMILY MEDICINE CLINIC | Age: 71
End: 2021-12-06

## 2021-12-06 DIAGNOSIS — F17.200 SMOKER: Primary | ICD-10-CM

## 2021-12-06 NOTE — TELEPHONE ENCOUNTER
Patient states she was diagnosed as pre diabetic - had an appt 12/15 and wanted to discuss her lab work then. Thought it was to be q 3 months. She would like to schedule her lung screen before the end of the year.

## 2021-12-06 NOTE — TELEPHONE ENCOUNTER
Discussed w/ patient. Order in for annual lung screening. Insurance will not pay for labs this soon. She'll call scheduling and keep her appt for next week, 12/15.

## 2021-12-06 NOTE — TELEPHONE ENCOUNTER
----- Message from Tammy Knight sent at 12/6/2021  9:56 AM EST -----  Subject: Message to Provider    QUESTIONS  Information for Provider? Pt needs routine 3 month blood work orders   placed. Then please call to schedule that bloodwork appt. Pt also needs   lung cancer screening order sent over to [facility]  ---------------------------------------------------------------------------  --------------  [address]  What is the best way for the office to contact you? OK to leave message on   voicemail  Preferred Call Back Phone Number? [phone]  ---------------------------------------------------------------------------  --------------  SCRIPT ANSWERS  Relationship to Patient?  Self

## 2021-12-15 ENCOUNTER — OFFICE VISIT (OUTPATIENT)
Dept: FAMILY MEDICINE CLINIC | Age: 71
End: 2021-12-15
Payer: MEDICARE

## 2021-12-15 VITALS
DIASTOLIC BLOOD PRESSURE: 80 MMHG | HEIGHT: 65 IN | BODY MASS INDEX: 26.26 KG/M2 | WEIGHT: 157.6 LBS | HEART RATE: 88 BPM | OXYGEN SATURATION: 97 % | SYSTOLIC BLOOD PRESSURE: 120 MMHG

## 2021-12-15 DIAGNOSIS — E11.9 TYPE 2 DIABETES MELLITUS WITHOUT COMPLICATION, WITHOUT LONG-TERM CURRENT USE OF INSULIN (HCC): ICD-10-CM

## 2021-12-15 DIAGNOSIS — E11.65 UNCONTROLLED TYPE 2 DIABETES MELLITUS WITH HYPERGLYCEMIA (HCC): Primary | ICD-10-CM

## 2021-12-15 DIAGNOSIS — F17.200 SMOKER: ICD-10-CM

## 2021-12-15 PROCEDURE — 4040F PNEUMOC VAC/ADMIN/RCVD: CPT | Performed by: FAMILY MEDICINE

## 2021-12-15 PROCEDURE — G8399 PT W/DXA RESULTS DOCUMENT: HCPCS | Performed by: FAMILY MEDICINE

## 2021-12-15 PROCEDURE — G8427 DOCREV CUR MEDS BY ELIG CLIN: HCPCS | Performed by: FAMILY MEDICINE

## 2021-12-15 PROCEDURE — 3044F HG A1C LEVEL LT 7.0%: CPT | Performed by: FAMILY MEDICINE

## 2021-12-15 PROCEDURE — 1090F PRES/ABSN URINE INCON ASSESS: CPT | Performed by: FAMILY MEDICINE

## 2021-12-15 PROCEDURE — G8482 FLU IMMUNIZE ORDER/ADMIN: HCPCS | Performed by: FAMILY MEDICINE

## 2021-12-15 PROCEDURE — 99213 OFFICE O/P EST LOW 20 MIN: CPT | Performed by: FAMILY MEDICINE

## 2021-12-15 PROCEDURE — G8417 CALC BMI ABV UP PARAM F/U: HCPCS | Performed by: FAMILY MEDICINE

## 2021-12-15 PROCEDURE — 1123F ACP DISCUSS/DSCN MKR DOCD: CPT | Performed by: FAMILY MEDICINE

## 2021-12-15 PROCEDURE — 3017F COLORECTAL CA SCREEN DOC REV: CPT | Performed by: FAMILY MEDICINE

## 2021-12-15 PROCEDURE — 4004F PT TOBACCO SCREEN RCVD TLK: CPT | Performed by: FAMILY MEDICINE

## 2021-12-15 PROCEDURE — 2022F DILAT RTA XM EVC RTNOPTHY: CPT | Performed by: FAMILY MEDICINE

## 2021-12-15 SDOH — ECONOMIC STABILITY: FOOD INSECURITY: WITHIN THE PAST 12 MONTHS, THE FOOD YOU BOUGHT JUST DIDN'T LAST AND YOU DIDN'T HAVE MONEY TO GET MORE.: NEVER TRUE

## 2021-12-15 SDOH — ECONOMIC STABILITY: FOOD INSECURITY: WITHIN THE PAST 12 MONTHS, YOU WORRIED THAT YOUR FOOD WOULD RUN OUT BEFORE YOU GOT MONEY TO BUY MORE.: NEVER TRUE

## 2021-12-15 ASSESSMENT — PATIENT HEALTH QUESTIONNAIRE - PHQ9
SUM OF ALL RESPONSES TO PHQ9 QUESTIONS 1 & 2: 0
SUM OF ALL RESPONSES TO PHQ QUESTIONS 1-9: 0
1. LITTLE INTEREST OR PLEASURE IN DOING THINGS: 0
SUM OF ALL RESPONSES TO PHQ QUESTIONS 1-9: 0
2. FEELING DOWN, DEPRESSED OR HOPELESS: 0
SUM OF ALL RESPONSES TO PHQ QUESTIONS 1-9: 0

## 2021-12-15 ASSESSMENT — SOCIAL DETERMINANTS OF HEALTH (SDOH): HOW HARD IS IT FOR YOU TO PAY FOR THE VERY BASICS LIKE FOOD, HOUSING, MEDICAL CARE, AND HEATING?: NOT HARD AT ALL

## 2021-12-15 NOTE — PROGRESS NOTES
Subjective:     Patient ID:Marychuy Mccormick is a 70 y.o. female. Diabetes  She presents for her follow-up diabetic visit. She has type 2 diabetes mellitus. Her disease course has been stable. There are no hypoglycemic associated symptoms. There are no diabetic associated symptoms. There are no hypoglycemic complications. Symptoms are stable. There are no diabetic complications. Risk factors for coronary artery disease include family history, dyslipidemia and diabetes mellitus. Current diabetic treatment includes diet. She is compliant with treatment all of the time. Her weight is stable. She is following a generally healthy diet. Meal planning includes avoidance of concentrated sweets. She has not had a previous visit with a dietitian. She participates in exercise daily. Her overall blood glucose range is 110-130 mg/dl. She does not see a podiatrist.Eye exam is not current. Allergies   Allergen Reactions    Glucosamine Chondroitin Joint [Glucos-Chondroit-Hyaluron-Msm] Itching     Use liquid form- had itching and metallic taste in mouth    Relafen [Nabumetone] Itching and Swelling       Current Outpatient Medications   Medication Sig Dispense Refill    blood glucose test strips (FREESTYLE LITE) strip USE 1 STRIP TO CHECK GLUCOSE ONCE DAILY 100 each 5    glucose monitoring (FREESTYLE) kit 1 kit by Does not apply route 2 times daily Dx: E11.65 1 kit 0    Misc Natural Products (CHROMIUM PICOLINATE FORTIFIED PO) Take 250 mcg by mouth daily      FREESTYLE LANCETS MISC 1 each by Does not apply route 2 times daily Dx: E11.65 100 each 3    vitamin B-12 (CYANOCOBALAMIN) 1000 MCG tablet Take 1,000 mcg by mouth daily      Calcium-Magnesium-Vitamin D (CALCIUM 500 PO) Take by mouth      vitamin D-3 (CHOLECALCIFEROL) 5000 UNITS TABS Take 5,000 Units by mouth daily. No current facility-administered medications for this visit.        Past Medical History:   Diagnosis Date    Arthritis     Bile induced gastritis     Chest pain 9/11    VENECIA--neg lexiscan    Chronic back pain     Colorectal polyps     Diabetes mellitus type 2 in obese ([address] 4/25/2018    Diverticulosis     GERD (gastroesophageal reflux disease)     Helicobacter pylori gastritis 2/2012    Postmenopausal bleeding 5/18/2010    Pregnancy     2 children - vaginal deliveries    Spinal stenosis     Thyromegaly 10/28/2020       Past Surgical History:   Procedure Laterality Date    CARPAL TUNNEL RELEASE  2006    CHOLECYSTECTOMY  1996    COLONOSCOPY  2016    q5y    MENISCECTOMY  2008    L knee    TUBAL LIGATION  1985       Social History     Socioeconomic History    Marital status: Single     Spouse name: Not on file    Number of children: 2    Years of education: Not on file    Highest education level: Not on file   Occupational History    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   Tobacco Use    Smoking status: Current Some Day Smoker     Packs/day: 0.10     Years: 25.00     Pack years: 2.50     Types: Cigars    Smokeless tobacco: Never Used    Tobacco comment: 4/25/19 precontemplation stage, smokes cigars only   Vaping Use    Vaping Use: Never used   Substance and Sexual Activity    Alcohol use: Not Currently     Alcohol/week: 1.0 standard drink     Types: 1 Cans of beer per week    Drug use: Never    Sexual activity: Not Currently     Partners: Male   Other Topics Concern    Not on file   Social History Narrative    Exercise rare    Lives by self    Single    Now retired    Missy Seen and grandchildren     Social Determinants of Health     Financial Resource Strain: Low Risk     Difficulty of Paying Living Expenses: Not hard at all   Food Insecurity: No Food Insecurity    Worried About Running Out of Food in the Last Year: Never true    Herminio of Food in the Last Year: Never true   Transportation Needs:     Lack of Transportation (Medical): Not on file    Lack of Transportation (Non-Medical):  Not on file   Physical Activity:     Days 08/07/2008    Zoster Live (Zostavax) 10/02/2012       Review of Systems  Review of Systems    Objective:   Physical Exam  Physical Exam  Vitals reviewed. Constitutional:       General: She is not in acute distress. Appearance: She is well-developed. Eyes:      Conjunctiva/sclera: Conjunctivae normal.      Pupils: Pupils are equal, round, and reactive to light. Neck:      Thyroid: No thyromegaly. Vascular: No JVD. Trachea: No tracheal deviation. Cardiovascular:      Rate and Rhythm: Normal rate and regular rhythm. Heart sounds: Normal heart sounds. No murmur heard. No gallop. Pulmonary:      Effort: Pulmonary effort is normal. No respiratory distress. Breath sounds: Normal breath sounds. No stridor. No wheezing or rales. Chest:      Chest wall: No tenderness. Abdominal:      General: Bowel sounds are normal. There is no distension. Palpations: Abdomen is soft. There is no mass. Tenderness: There is no abdominal tenderness. Musculoskeletal:         General: No tenderness. Lymphadenopathy:      Cervical: No cervical adenopathy. Skin:     General: Skin is warm and dry. Coloration: Skin is not pale. Findings: No erythema or rash. Neurological:      Mental Status: She is alert and oriented to person, place, and time. Cranial Nerves: No cranial nerve deficit. Motor: No abnormal muscle tone. Coordination: Coordination normal.      Deep Tendon Reflexes: Reflexes normal.   Psychiatric:         Behavior: Behavior normal.         Thought Content:  Thought content normal.         Judgment: Judgment normal.         Assessment and Plan:     Smoker  Discussed-ct soon and will try to DC     Type 2 diabetes mellitus without complication, without long-term current use of insulin ([facility])   wgt stable--BS ok--labs in 3 m

## 2021-12-16 ENCOUNTER — HOSPITAL ENCOUNTER (OUTPATIENT)
Dept: CT IMAGING | Age: 71
Discharge: HOME | End: 2021-12-16
Payer: MEDICARE

## 2021-12-16 DIAGNOSIS — F17.200 SMOKER: ICD-10-CM

## 2021-12-16 PROCEDURE — 71271 CT THORAX LUNG CANCER SCR C-: CPT

## 2021-12-17 ENCOUNTER — TELEPHONE (OUTPATIENT)
Dept: CT IMAGING | Age: 71
End: 2021-12-17

## 2021-12-17 NOTE — TELEPHONE ENCOUNTER
Annual lung screen on 12/17/2021. LRAD2. Recommended screen in one year. Reviewed by ordering physician and ordering office contacts pt with results. Results letter mailed.      Parviz IVORYN, RN   Lung Nodule Navigator  [facility] [employer]  [phone]

## 2022-03-29 ENCOUNTER — OFFICE VISIT (OUTPATIENT)
Dept: FAMILY MEDICINE CLINIC | Age: 72
End: 2022-03-29
Payer: MEDICARE

## 2022-03-29 VITALS
SYSTOLIC BLOOD PRESSURE: 130 MMHG | HEIGHT: 65 IN | DIASTOLIC BLOOD PRESSURE: 80 MMHG | HEART RATE: 88 BPM | OXYGEN SATURATION: 97 % | WEIGHT: 156 LBS | BODY MASS INDEX: 25.99 KG/M2

## 2022-03-29 DIAGNOSIS — K63.5 COLORECTAL POLYPS: ICD-10-CM

## 2022-03-29 DIAGNOSIS — I25.84 CORONARY ARTERY CALCIFICATION: ICD-10-CM

## 2022-03-29 DIAGNOSIS — K62.1 COLORECTAL POLYPS: ICD-10-CM

## 2022-03-29 DIAGNOSIS — R03.0 ELEVATED BLOOD PRESSURE READING WITHOUT DIAGNOSIS OF HYPERTENSION: ICD-10-CM

## 2022-03-29 DIAGNOSIS — Z00.00 WELL ADULT EXAM: Primary | ICD-10-CM

## 2022-03-29 DIAGNOSIS — E11.9 TYPE 2 DIABETES MELLITUS WITHOUT COMPLICATION, WITHOUT LONG-TERM CURRENT USE OF INSULIN (HCC): ICD-10-CM

## 2022-03-29 DIAGNOSIS — M54.16 LUMBAR RADICULOPATHY: ICD-10-CM

## 2022-03-29 DIAGNOSIS — I65.23 BILATERAL CAROTID ARTERY STENOSIS: ICD-10-CM

## 2022-03-29 DIAGNOSIS — I25.10 CORONARY ARTERY CALCIFICATION: ICD-10-CM

## 2022-03-29 PROBLEM — R42 DIZZINESS: Status: RESOLVED | Noted: 2020-10-28 | Resolved: 2022-03-29

## 2022-03-29 LAB
A/G RATIO: 1.7 (ref 1.1–2.2)
ALBUMIN SERPL-MCNC: 4.6 G/DL (ref 3.4–5)
ALP BLD-CCNC: 69 U/L (ref 40–129)
ALT SERPL-CCNC: <5 U/L (ref 10–40)
ANION GAP SERPL CALCULATED.3IONS-SCNC: 11 MMOL/L (ref 3–16)
AST SERPL-CCNC: 13 U/L (ref 15–37)
BASOPHILS ABSOLUTE: 0 K/UL (ref 0–0.2)
BASOPHILS RELATIVE PERCENT: 0.5 %
BILIRUB SERPL-MCNC: <0.2 MG/DL (ref 0–1)
BUN BLDV-MCNC: 8 MG/DL (ref 7–20)
CALCIUM SERPL-MCNC: 9.7 MG/DL (ref 8.3–10.6)
CHLORIDE BLD-SCNC: 107 MMOL/L (ref 99–110)
CHOLESTEROL, TOTAL: 199 MG/DL (ref 0–199)
CO2: 26 MMOL/L (ref 21–32)
CREAT SERPL-MCNC: 0.8 MG/DL (ref 0.6–1.2)
EOSINOPHILS ABSOLUTE: 0 K/UL (ref 0–0.6)
EOSINOPHILS RELATIVE PERCENT: 1.1 %
GFR AFRICAN AMERICAN: >60
GFR NON-AFRICAN AMERICAN: >60
GLUCOSE BLD-MCNC: 94 MG/DL (ref 70–99)
HCT VFR BLD CALC: 44.4 % (ref 36–48)
HDLC SERPL-MCNC: 48 MG/DL (ref 40–60)
HEMOGLOBIN: 14.8 G/DL (ref 12–16)
LDL CHOLESTEROL CALCULATED: 130 MG/DL
LYMPHOCYTES ABSOLUTE: 1.9 K/UL (ref 1–5.1)
LYMPHOCYTES RELATIVE PERCENT: 42.6 %
MCH RBC QN AUTO: 30.3 PG (ref 26–34)
MCHC RBC AUTO-ENTMCNC: 33.4 G/DL (ref 31–36)
MCV RBC AUTO: 90.5 FL (ref 80–100)
MONOCYTES ABSOLUTE: 0.4 K/UL (ref 0–1.3)
MONOCYTES RELATIVE PERCENT: 8.3 %
NEUTROPHILS ABSOLUTE: 2.1 K/UL (ref 1.7–7.7)
NEUTROPHILS RELATIVE PERCENT: 47.5 %
PDW BLD-RTO: 15.9 % (ref 12.4–15.4)
PLATELET # BLD: 158 K/UL (ref 135–450)
PMV BLD AUTO: 9.8 FL (ref 5–10.5)
POTASSIUM SERPL-SCNC: 4.3 MMOL/L (ref 3.5–5.1)
RBC # BLD: 4.9 M/UL (ref 4–5.2)
SODIUM BLD-SCNC: 144 MMOL/L (ref 136–145)
TOTAL PROTEIN: 7.3 G/DL (ref 6.4–8.2)
TRIGL SERPL-MCNC: 104 MG/DL (ref 0–150)
VLDLC SERPL CALC-MCNC: 21 MG/DL
WBC # BLD: 4.4 K/UL (ref 4–11)

## 2022-03-29 PROCEDURE — G0439 PPPS, SUBSEQ VISIT: HCPCS | Performed by: FAMILY MEDICINE

## 2022-03-29 ASSESSMENT — PATIENT HEALTH QUESTIONNAIRE - PHQ9
SUM OF ALL RESPONSES TO PHQ QUESTIONS 1-9: 0
2. FEELING DOWN, DEPRESSED OR HOPELESS: 0
SUM OF ALL RESPONSES TO PHQ QUESTIONS 1-9: 0
SUM OF ALL RESPONSES TO PHQ9 QUESTIONS 1 & 2: 0
SUM OF ALL RESPONSES TO PHQ QUESTIONS 1-9: 0
1. LITTLE INTEREST OR PLEASURE IN DOING THINGS: 0
SUM OF ALL RESPONSES TO PHQ QUESTIONS 1-9: 0

## 2022-03-29 ASSESSMENT — LIFESTYLE VARIABLES
HOW MANY STANDARD DRINKS CONTAINING ALCOHOL DO YOU HAVE ON A TYPICAL DAY: 1 OR 2
HOW OFTEN DO YOU HAVE A DRINK CONTAINING ALCOHOL: MONTHLY OR LESS

## 2022-03-29 NOTE — PATIENT INSTRUCTIONS
Personalized Preventive Plan for Michelle Saini - 3/29/2022  Medicare offers a range of preventive health benefits. Some of the tests and screenings are paid in full while other may be subject to a deductible, co-insurance, and/or copay. Some of these benefits include a comprehensive review of your medical history including lifestyle, illnesses that may run in your family, and various assessments and screenings as appropriate. After reviewing your medical record and screening and assessments performed today your provider may have ordered immunizations, labs, imaging, and/or referrals for you. A list of these orders (if applicable) as well as your Preventive Care list are included within your After Visit Summary for your review. Other Preventive Recommendations:    · A preventive eye exam performed by an eye specialist is recommended every 1-2 years to screen for glaucoma; cataracts, macular degeneration, and other eye disorders. · A preventive dental visit is recommended every 6 months. · Try to get at least 150 minutes of exercise per week or 10,000 steps per day on a pedometer . · Order or download the FREE \"Exercise & Physical Activity: Your Everyday Guide\" from The [employer]. Call 3-475.810.5065 or search [employer] online. · You need 5315-0443 mg of calcium and 7424-9234 IU of vitamin D per day. It is possible to meet your calcium requirement with diet alone, but a vitamin D supplement is usually necessary to meet this goal.  · When exposed to the sun, use a sunscreen that protects against both UVA and UVB radiation with an SPF of 30 or greater. Reapply every 2 to 3 hours or after sweating, drying off with a towel, or swimming. · Always wear a seat belt when traveling in a car. Always wear a helmet when riding a bicycle or motorcycle.

## 2022-03-29 NOTE — PROGRESS NOTES
exam   labs and shingrix        Patient's complete Health Risk Assessment and screening values have been reviewed and are found in Flowsheets. The following problems were reviewed today and where indicated follow up appointments were made and/or referrals ordered. Positive Risk Factor Screenings with Interventions:       Tobacco Use:     Tobacco Use: High Risk    Smoking Tobacco Use: Current Some Day Smoker    Smokeless Tobacco Use: Never Used     E-Cigarettes/Vaping Use     Questions Responses    E-Cigarette/Vaping Use Never User    Start Date     Passive Exposure     Quit Date     Counseling Given     Comments         Substance Abuse - Tobacco Interventions:  has stopped smoking         General Health and ACP:  General  In general, how would you say your health is?: Good  In the past 7 days, have you experienced any of the following: New or Increased Pain, New or Increased Fatigue, Loneliness, Social Isolation, Stress or Anger?: No  Do you get the social and emotional support that you need?: Yes  Do you have a Living Will?: (!) No    Advance Directives     Power of  Living Will ACP-Advance Directive ACP-Power of     Not on File Filed on 07/31/17 Filed Not on File      General Health Risk Interventions:  · none              Objective   Vitals:    03/29/22 0849   BP: 130/80   Pulse: 88   SpO2: 97%   Weight: 156 lb (70.8 kg)   Height: 5' 5\" (1.651 m)      Body mass index is 25.96 kg/m².         General Appearance: alert and oriented to person, place and time, well developed and well- nourished, in no acute distress  Skin: warm and dry, no rash or erythema  Head: normocephalic and atraumatic  Eyes: pupils equal, round, and reactive to light, extraocular eye movements intact, conjunctivae normal  ENT: tympanic membrane, external ear and ear canal normal bilaterally, nose without deformity, nasal mucosa and turbinates normal without polyps  Neck: supple and non-tender without mass, no thyromegaly or thyroid nodules, no cervical lymphadenopathy  Pulmonary/Chest: clear to auscultation bilaterally- no wheezes, rales or rhonchi, normal air movement, no respiratory distress  Cardiovascular: normal rate, regular rhythm, normal S1 and S2, no murmurs, rubs, clicks, or gallops, distal pulses intact, no carotid bruits  Abdomen: soft, non-tender, non-distended, normal bowel sounds, no masses or organomegaly  Extremities: no cyanosis, clubbing or edema  Musculoskeletal: normal range of motion, no joint swelling, deformity or tenderness  Neurologic: reflexes normal and symmetric, no cranial nerve deficit, gait, coordination and speech normal       Allergies   Allergen Reactions    Glucosamine Chondroitin Joint [Glucos-Chondroit-Hyaluron-Msm] Itching     Use liquid form- had itching and metallic taste in mouth    Relafen [Nabumetone] Itching and Swelling     Prior to Visit Medications    Medication Sig Taking? Authorizing Provider   NONFORMULARY G-150 - Benfotiamine Yes Historical Provider, MD   blood glucose test strips (FREESTYLE LITE) strip USE 1 STRIP TO CHECK GLUCOSE ONCE DAILY Yes Taran Villalobos MD   glucose monitoring (FREESTYLE) kit 1 kit by Does not apply route 2 times daily Dx: E11.65 Yes Taran Villalobos MD   Misc Natural Products (CHROMIUM PICOLINATE FORTIFIED PO) Take 500 mcg by mouth daily  Yes Historical Provider, MD   FREESTYLE LANCETS MISC 1 each by Does not apply route 2 times daily Dx: E11.65 Yes Taran Villalobos MD   vitamin B-12 (CYANOCOBALAMIN) 1000 MCG tablet Take 1,000 mcg by mouth daily Yes Historical Provider, MD   Calcium-Magnesium-Vitamin D (CALCIUM 500 PO) Take by mouth Yes Historical Provider, MD   vitamin D-3 (CHOLECALCIFEROL) 5000 UNITS TABS Take 5,000 Units by mouth daily.  Yes Historical Provider, MD Aguilar (Including outside providers/suppliers regularly involved in providing care):   Patient Care Team:  Taran Villalobos MD as PCP - General (Family Medicine)  Marcos Cruz MD as PCP - [facility] Empaneled Provider  Fernandez Zamudio MD as Consulting Physician (Otolaryngology)    Reviewed and updated this visit:  Tobacco  Allergies  Meds  Problems  Med Hx  Surg Hx  Soc Hx  Fam Hx

## 2022-03-30 LAB
ESTIMATED AVERAGE GLUCOSE: 122.6 MG/DL
HBA1C MFR BLD: 5.9 %

## 2022-12-12 ENCOUNTER — TELEPHONE (OUTPATIENT)
Dept: CASE MANAGEMENT | Age: 72
End: 2022-12-12

## 2022-12-12 NOTE — TELEPHONE ENCOUNTER
Patient due for annual CT Lung Screening. Reminder letter mailed.       Crystal IVORYN, RN   Lung Nodule Navigator  [facility] [place]  [phone]

## 2023-01-03 ENCOUNTER — TELEPHONE (OUTPATIENT)
Dept: CASE MANAGEMENT | Age: 73
End: 2023-01-03

## 2023-01-03 DIAGNOSIS — F17.200 SMOKER: Primary | ICD-10-CM

## 2023-01-03 NOTE — TELEPHONE ENCOUNTER
Dr. Maurice,    Patient due for annual CT Lung screening. If you would like patient to have screening, please place order for CT Lung screening (IM [id number]). I will contact patient to help schedule after order entered.    Thanks,  Ivana IVORYN, RN   Lung Nodule Navigator  [facility]  [phone]

## 2023-01-09 ENCOUNTER — TELEPHONE (OUTPATIENT)
Dept: CASE MANAGEMENT | Age: 73
End: 2023-01-09

## 2023-01-09 NOTE — TELEPHONE ENCOUNTER
Pt due for annual Lung Screening CT. Order in place. Called pt to assist with scheduling. No answer- left vm.     Kari IVORYN, RN   Lung Nodule Navigator  [facility]  [phone]

## 2023-01-16 ENCOUNTER — TELEPHONE (OUTPATIENT)
Dept: CASE MANAGEMENT | Age: 73
End: 2023-01-16

## 2023-01-16 NOTE — TELEPHONE ENCOUNTER
Patient due for annual CT Lung Screening. Reminder letter mailed.       Mary IVORYN, RN   Lung Nodule Navigator  [facility]  [phone]

## 2023-02-15 ENCOUNTER — TELEPHONE (OUTPATIENT)
Dept: CASE MANAGEMENT | Age: 73
End: 2023-02-15

## 2023-02-15 NOTE — TELEPHONE ENCOUNTER
Pt due for annual Lung Screening CT. Order in place. Called pt to assist with scheduling. No answer- left vm.     Malina IVORYN, RN   Lung Nodule Navigator  [facility]  [phone]

## 2023-02-22 ENCOUNTER — TELEPHONE (OUTPATIENT)
Dept: CASE MANAGEMENT | Age: 73
End: 2023-02-22

## 2023-02-22 NOTE — TELEPHONE ENCOUNTER
Patient due for annual CT Lung Screening. Final reminder letter mailed.       Marylou IVORYN, RN   Lung Nodule Navigator  [facility] [place]  [phone]

## 2023-02-23 NOTE — ASSESSMENT & PLAN NOTE
Discussed-ct soon and will try to DC [de-identified] : \par RIGHT KNEE\par Inspection:  mild effusion, incisions c/d/i\par Palpation: medial femoral condyle ttp\par Knee Range of Motion:  0-120\par Strength: 4/5 Quadriceps strength, 5/5 Hamstring strength\par Neurological: light touch is intact throughout\par Ligament Stability and Special Tests: \par McMurrays: neg\par Lachman: neg\par Pivot Shift: neg\par Posterior Drawer: neg\par Valgus: neg\par Varus: neg\par Patella Apprehension: neg\par Patella Maltracking: neg\par

## 2023-03-13 ENCOUNTER — TELEPHONE (OUTPATIENT)
Dept: FAMILY MEDICINE CLINIC | Age: 73
End: 2023-03-13

## 2023-03-13 DIAGNOSIS — I25.84 CORONARY ARTERY CALCIFICATION: ICD-10-CM

## 2023-03-13 DIAGNOSIS — E11.65 UNCONTROLLED TYPE 2 DIABETES MELLITUS WITH HYPERGLYCEMIA (HCC): ICD-10-CM

## 2023-03-13 DIAGNOSIS — I25.10 CORONARY ARTERY CALCIFICATION: ICD-10-CM

## 2023-03-13 DIAGNOSIS — F17.200 SMOKER: Primary | ICD-10-CM

## 2023-03-13 DIAGNOSIS — R03.0 ELEVATED BLOOD PRESSURE READING WITHOUT DIAGNOSIS OF HYPERTENSION: ICD-10-CM

## 2023-03-13 NOTE — TELEPHONE ENCOUNTER
Patient given message orders for fasting labs and low dose lung CT are in [facility].  Patient given the number of scheduling.

## 2023-03-13 NOTE — TELEPHONE ENCOUNTER
----- Message from [place] sent at 3/13/2023  9:28 AM EDT -----  Subject: Referral Request    Reason for referral request? Patient would like to have A1C labs, lipid   panel, and Hemoglobin done. Patient would also like for CT Lung Screen   result to go to Dr. Niki Olivarez. Provider patient wants to be referred to(if known):     Provider Phone Number(if known):     Additional Information for Provider?   ---------------------------------------------------------------------------  --------------  [id number]    [phone]; [place] to leave message on voicemail  ---------------------------------------------------------------------------  --------------

## 2023-03-20 DIAGNOSIS — I25.84 CORONARY ARTERY CALCIFICATION: ICD-10-CM

## 2023-03-20 DIAGNOSIS — F17.200 SMOKER: ICD-10-CM

## 2023-03-20 DIAGNOSIS — I25.10 CORONARY ARTERY CALCIFICATION: ICD-10-CM

## 2023-03-20 DIAGNOSIS — E11.65 UNCONTROLLED TYPE 2 DIABETES MELLITUS WITH HYPERGLYCEMIA (HCC): ICD-10-CM

## 2023-03-20 DIAGNOSIS — R03.0 ELEVATED BLOOD PRESSURE READING WITHOUT DIAGNOSIS OF HYPERTENSION: ICD-10-CM

## 2023-03-20 LAB
ALBUMIN SERPL-MCNC: 4 G/DL (ref 3.4–5)
ALBUMIN/GLOB SERPL: 1.5 {RATIO} (ref 1.1–2.2)
ALP SERPL-CCNC: 65 U/L (ref 40–129)
ALT SERPL-CCNC: <5 U/L (ref 10–40)
ANION GAP SERPL CALCULATED.3IONS-SCNC: 8 MMOL/L (ref 3–16)
AST SERPL-CCNC: 13 U/L (ref 15–37)
BASOPHILS # BLD: 0 K/UL (ref 0–0.2)
BASOPHILS NFR BLD: 0.7 %
BILIRUB SERPL-MCNC: <0.2 MG/DL (ref 0–1)
BUN SERPL-MCNC: 13 MG/DL (ref 7–20)
CALCIUM SERPL-MCNC: 9.3 MG/DL (ref 8.3–10.6)
CHLORIDE SERPL-SCNC: 108 MMOL/L (ref 99–110)
CHOLEST SERPL-MCNC: 191 MG/DL (ref 0–199)
CO2 SERPL-SCNC: 28 MMOL/L (ref 21–32)
CREAT SERPL-MCNC: 0.9 MG/DL (ref 0.6–1.2)
DEPRECATED RDW RBC AUTO: 15.1 % (ref 12.4–15.4)
EOSINOPHIL # BLD: 0.1 K/UL (ref 0–0.6)
EOSINOPHIL NFR BLD: 2.1 %
GFR SERPLBLD CREATININE-BSD FMLA CKD-EPI: >60 ML/MIN/{1.73_M2}
GLUCOSE SERPL-MCNC: 106 MG/DL (ref 70–99)
HCT VFR BLD AUTO: 38.8 % (ref 36–48)
HDLC SERPL-MCNC: 65 MG/DL (ref 40–60)
HGB BLD-MCNC: 13 G/DL (ref 12–16)
LDLC SERPL CALC-MCNC: 114 MG/DL
LYMPHOCYTES # BLD: 2.1 K/UL (ref 1–5.1)
LYMPHOCYTES NFR BLD: 54.2 %
MCH RBC QN AUTO: 29.6 PG (ref 26–34)
MCHC RBC AUTO-ENTMCNC: 33.5 G/DL (ref 31–36)
MCV RBC AUTO: 88.4 FL (ref 80–100)
MONOCYTES # BLD: 0.4 K/UL (ref 0–1.3)
MONOCYTES NFR BLD: 9.4 %
NEUTROPHILS # BLD: 1.3 K/UL (ref 1.7–7.7)
NEUTROPHILS NFR BLD: 33.6 %
PLATELET # BLD AUTO: 156 K/UL (ref 135–450)
PMV BLD AUTO: 10.3 FL (ref 5–10.5)
POTASSIUM SERPL-SCNC: 4.4 MMOL/L (ref 3.5–5.1)
PROT SERPL-MCNC: 6.6 G/DL (ref 6.4–8.2)
RBC # BLD AUTO: 4.39 M/UL (ref 4–5.2)
SODIUM SERPL-SCNC: 144 MMOL/L (ref 136–145)
TRIGL SERPL-MCNC: 62 MG/DL (ref 0–150)
VLDLC SERPL CALC-MCNC: 12 MG/DL
WBC # BLD AUTO: 3.9 K/UL (ref 4–11)

## 2023-03-21 LAB
EST. AVERAGE GLUCOSE BLD GHB EST-MCNC: 122.6 MG/DL
HBA1C MFR BLD: 5.9 %

## 2023-03-23 ENCOUNTER — HOSPITAL ENCOUNTER (OUTPATIENT)
Dept: CT IMAGING | Age: 73
Discharge: HOME | End: 2023-03-23
Payer: MEDICARE

## 2023-03-23 DIAGNOSIS — F17.200 SMOKER: ICD-10-CM

## 2023-03-23 PROCEDURE — 71250 CT THORAX DX C-: CPT

## 2023-03-30 ENCOUNTER — OFFICE VISIT (OUTPATIENT)
Dept: FAMILY MEDICINE CLINIC | Age: 73
End: 2023-03-30

## 2023-03-30 VITALS
OXYGEN SATURATION: 98 % | BODY MASS INDEX: 28.49 KG/M2 | HEIGHT: 65 IN | TEMPERATURE: 98.3 F | SYSTOLIC BLOOD PRESSURE: 138 MMHG | HEART RATE: 67 BPM | WEIGHT: 171 LBS | DIASTOLIC BLOOD PRESSURE: 88 MMHG

## 2023-03-30 DIAGNOSIS — Z12.31 ENCOUNTER FOR SCREENING MAMMOGRAM FOR MALIGNANT NEOPLASM OF BREAST: Primary | ICD-10-CM

## 2023-03-30 DIAGNOSIS — E11.65 UNCONTROLLED TYPE 2 DIABETES MELLITUS WITH HYPERGLYCEMIA (HCC): ICD-10-CM

## 2023-03-30 DIAGNOSIS — Z00.00 ENCOUNTER FOR ANNUAL WELLNESS VISIT (AWV) IN MEDICARE PATIENT: ICD-10-CM

## 2023-03-30 DIAGNOSIS — E11.9 TYPE 2 DIABETES MELLITUS WITHOUT COMPLICATION, WITHOUT LONG-TERM CURRENT USE OF INSULIN (HCC): ICD-10-CM

## 2023-03-30 PROBLEM — M54.32 SCIATICA, LEFT SIDE: Status: RESOLVED | Noted: 2017-07-13 | Resolved: 2023-03-30

## 2023-03-30 PROBLEM — L98.9 SKIN LESION OF LEFT LEG: Status: RESOLVED | Noted: 2017-03-10 | Resolved: 2023-03-30

## 2023-03-30 SDOH — ECONOMIC STABILITY: FOOD INSECURITY: WITHIN THE PAST 12 MONTHS, THE FOOD YOU BOUGHT JUST DIDN'T LAST AND YOU DIDN'T HAVE MONEY TO GET MORE.: NEVER TRUE

## 2023-03-30 SDOH — ECONOMIC STABILITY: INCOME INSECURITY: HOW HARD IS IT FOR YOU TO PAY FOR THE VERY BASICS LIKE FOOD, HOUSING, MEDICAL CARE, AND HEATING?: NOT HARD AT ALL

## 2023-03-30 SDOH — ECONOMIC STABILITY: FOOD INSECURITY: WITHIN THE PAST 12 MONTHS, YOU WORRIED THAT YOUR FOOD WOULD RUN OUT BEFORE YOU GOT MONEY TO BUY MORE.: NEVER TRUE

## 2023-03-30 SDOH — ECONOMIC STABILITY: HOUSING INSECURITY
IN THE LAST 12 MONTHS, WAS THERE A TIME WHEN YOU DID NOT HAVE A STEADY PLACE TO SLEEP OR SLEPT IN A SHELTER (INCLUDING NOW)?: NO

## 2023-03-30 ASSESSMENT — PATIENT HEALTH QUESTIONNAIRE - PHQ9
SUM OF ALL RESPONSES TO PHQ QUESTIONS 1-9: 0
1. LITTLE INTEREST OR PLEASURE IN DOING THINGS: 0
SUM OF ALL RESPONSES TO PHQ QUESTIONS 1-9: 0
SUM OF ALL RESPONSES TO PHQ QUESTIONS 1-9: 0
2. FEELING DOWN, DEPRESSED OR HOPELESS: 0
DEPRESSION UNABLE TO ASSESS: FUNCTIONAL CAPACITY MOTIVATION LIMITS ACCURACY
SUM OF ALL RESPONSES TO PHQ QUESTIONS 1-9: 0
SUM OF ALL RESPONSES TO PHQ9 QUESTIONS 1 & 2: 0

## 2023-03-30 NOTE — PROGRESS NOTES
(Shingrix) 11/09/2022, 02/07/2023        Health Maintenance   Topic Date Due    Diabetic retinal exam  Never done    Diabetic Alb to Cr ratio (uACR) test  10/29/2021    Breast cancer screen  01/27/2023    DTaP/Tdap/Td vaccine (2 - Td or Tdap) 03/12/2023    Diabetic foot exam  03/29/2023    Depression Screen  03/29/2023    Annual Wellness Visit (AWV)  03/30/2023    A1C test (Diabetic or Prediabetic)  03/20/2024    Lipids  03/20/2024    GFR test (Diabetes, CKD 3-4, OR last GFR 15-59)  03/20/2024    Colorectal Cancer Screen  02/13/2027    DEXA (modify frequency per FRAX score)  Completed    Flu vaccine  Completed    Shingles vaccine  Completed    Pneumococcal 65+ years Vaccine  Completed    COVID-19 Vaccine  Completed    Hepatitis C screen  Completed    Hepatitis A vaccine  Aged Out    Hib vaccine  Aged Out    Meningococcal (ACWY) vaccine  Aged Out     Recommendations for [facility] Due: see orders and patient instructions/AVS.  . Recommended screening schedule for the next 5-10 years is provided to the patient in written form: see Patient Instructions/AVS.    Encounter for annual wellness visit (AWV) in Medicare patient   Annual wellness completed  Reviewed  mammo ordered  Reviewed labs      Encounter for screening mammogram for malignant neoplasm of breast  -     [facility]PHINE DIGITAL SCREEN W OR WO CAD BILATERAL;  Future  Uncontrolled type 2 diabetes mellitus with hyperglycemia ([facility] [address]  Encounter for annual wellness visit (AWV) in Medicare patient  Assessment & Plan:   Annual wellness completed  Reviewed  mammo ordered  Reviewed labs

## 2023-05-01 ENCOUNTER — HOSPITAL ENCOUNTER (OUTPATIENT)
Dept: MAMMOGRAPHY | Age: 73
Discharge: HOME | End: 2023-05-01
Payer: MEDICARE

## 2023-05-01 VITALS — HEIGHT: 65 IN | BODY MASS INDEX: 28.49 KG/M2 | WEIGHT: 171 LBS

## 2023-05-01 DIAGNOSIS — Z12.31 ENCOUNTER FOR SCREENING MAMMOGRAM FOR MALIGNANT NEOPLASM OF BREAST: ICD-10-CM

## 2023-05-01 DIAGNOSIS — Z12.31 VISIT FOR SCREENING MAMMOGRAM: ICD-10-CM

## 2023-05-01 PROCEDURE — 77067 SCR MAMMO BI INCL CAD: CPT

## 2023-07-12 ENCOUNTER — OFFICE VISIT (OUTPATIENT)
Dept: FAMILY MEDICINE CLINIC | Age: 73
End: 2023-07-12
Payer: MEDICARE

## 2023-07-12 VITALS
OXYGEN SATURATION: 97 % | HEART RATE: 64 BPM | BODY MASS INDEX: 29.82 KG/M2 | WEIGHT: 179 LBS | TEMPERATURE: 98.6 F | SYSTOLIC BLOOD PRESSURE: 132 MMHG | HEIGHT: 65 IN | DIASTOLIC BLOOD PRESSURE: 86 MMHG

## 2023-07-12 DIAGNOSIS — S16.1XXA ACUTE STRAIN OF NECK MUSCLE, INITIAL ENCOUNTER: Primary | ICD-10-CM

## 2023-07-12 PROCEDURE — G8427 DOCREV CUR MEDS BY ELIG CLIN: HCPCS | Performed by: NURSE PRACTITIONER

## 2023-07-12 PROCEDURE — 99213 OFFICE O/P EST LOW 20 MIN: CPT | Performed by: NURSE PRACTITIONER

## 2023-07-12 PROCEDURE — G8399 PT W/DXA RESULTS DOCUMENT: HCPCS | Performed by: NURSE PRACTITIONER

## 2023-07-12 PROCEDURE — 1036F TOBACCO NON-USER: CPT | Performed by: NURSE PRACTITIONER

## 2023-07-12 PROCEDURE — G8417 CALC BMI ABV UP PARAM F/U: HCPCS | Performed by: NURSE PRACTITIONER

## 2023-07-12 PROCEDURE — 1090F PRES/ABSN URINE INCON ASSESS: CPT | Performed by: NURSE PRACTITIONER

## 2023-07-12 PROCEDURE — 3017F COLORECTAL CA SCREEN DOC REV: CPT | Performed by: NURSE PRACTITIONER

## 2023-07-12 PROCEDURE — 1123F ACP DISCUSS/DSCN MKR DOCD: CPT | Performed by: NURSE PRACTITIONER

## 2023-07-12 ASSESSMENT — ENCOUNTER SYMPTOMS
DIARRHEA: 0
SINUS PRESSURE: 0
SHORTNESS OF BREATH: 0
COLOR CHANGE: 0
COUGH: 0
SINUS PAIN: 0
ABDOMINAL PAIN: 0
BACK PAIN: 0
WHEEZING: 0
CONSTIPATION: 0

## 2023-07-12 NOTE — ASSESSMENT & PLAN NOTE
Given stretches and exercises to loosen muscle  Encouraged to continue using Aleeve for pain and discomfort  Follow up in 2-3 weeks if pain worsens or persists

## 2023-08-22 ENCOUNTER — OFFICE VISIT (OUTPATIENT)
Dept: FAMILY MEDICINE CLINIC | Age: 73
End: 2023-08-22
Payer: MEDICARE

## 2023-08-22 VITALS
HEIGHT: 65 IN | BODY MASS INDEX: 30.82 KG/M2 | HEART RATE: 73 BPM | DIASTOLIC BLOOD PRESSURE: 86 MMHG | TEMPERATURE: 98.6 F | SYSTOLIC BLOOD PRESSURE: 138 MMHG | OXYGEN SATURATION: 98 % | WEIGHT: 185 LBS

## 2023-08-22 DIAGNOSIS — R73.03 PREDIABETES: ICD-10-CM

## 2023-08-22 DIAGNOSIS — M54.2 NECK PAIN: Primary | ICD-10-CM

## 2023-08-22 DIAGNOSIS — R10.13 DYSPEPSIA: ICD-10-CM

## 2023-08-22 DIAGNOSIS — S16.1XXA STRAIN OF CERVICAL PORTION OF LEFT TRAPEZIUS MUSCLE: ICD-10-CM

## 2023-08-22 PROCEDURE — 1090F PRES/ABSN URINE INCON ASSESS: CPT | Performed by: NURSE PRACTITIONER

## 2023-08-22 PROCEDURE — G8417 CALC BMI ABV UP PARAM F/U: HCPCS | Performed by: NURSE PRACTITIONER

## 2023-08-22 PROCEDURE — G8427 DOCREV CUR MEDS BY ELIG CLIN: HCPCS | Performed by: NURSE PRACTITIONER

## 2023-08-22 PROCEDURE — 3017F COLORECTAL CA SCREEN DOC REV: CPT | Performed by: NURSE PRACTITIONER

## 2023-08-22 PROCEDURE — 1036F TOBACCO NON-USER: CPT | Performed by: NURSE PRACTITIONER

## 2023-08-22 PROCEDURE — 1123F ACP DISCUSS/DSCN MKR DOCD: CPT | Performed by: NURSE PRACTITIONER

## 2023-08-22 PROCEDURE — 99214 OFFICE O/P EST MOD 30 MIN: CPT | Performed by: NURSE PRACTITIONER

## 2023-08-22 PROCEDURE — G8399 PT W/DXA RESULTS DOCUMENT: HCPCS | Performed by: NURSE PRACTITIONER

## 2023-08-22 RX ORDER — METFORMIN HYDROCHLORIDE 500 MG/1
1000 TABLET, EXTENDED RELEASE ORAL
Qty: 30 TABLET | Refills: 5 | Status: SHIPPED | OUTPATIENT
Start: 2023-08-22

## 2023-08-22 RX ORDER — PREDNISONE 20 MG/1
40 TABLET ORAL DAILY
Qty: 10 TABLET | Refills: 0 | Status: SHIPPED | OUTPATIENT
Start: 2023-08-22 | End: 2023-08-27

## 2023-08-22 RX ORDER — FAMOTIDINE 20 MG/1
20 TABLET, FILM COATED ORAL 2 TIMES DAILY
Qty: 60 TABLET | Refills: 0 | Status: SHIPPED | OUTPATIENT
Start: 2023-08-22

## 2023-08-22 ASSESSMENT — ENCOUNTER SYMPTOMS
SHORTNESS OF BREATH: 0
COUGH: 0
SORE THROAT: 1
COLOR CHANGE: 0
SINUS PRESSURE: 0
DIARRHEA: 0
BACK PAIN: 0
ABDOMINAL PAIN: 0
CONSTIPATION: 0
WHEEZING: 0
SINUS PAIN: 0

## 2023-08-22 NOTE — PROGRESS NOTES
Riya Galeas (:  1950) is a 68 y.o. female,Established patient, here for evaluation of the following chief complaint(s):  Shoulder Pain (Left, has been present for some time and has been addressed before) and Weight Gain      ASSESSMENT/PLAN:  1. Neck pain  -     [facility] Hernan Ugarte  2. Prediabetes  Assessment & Plan:  Discussed diet and exercise  We will start metformin for prediabetes  Reviewed recent A1c  3. Strain of cervical portion of left trapezius muscle  Assessment & Plan:  More relief with NSAIDs as she does  Prednisone x5 days  Refer to PT  Follow up if no better   4. Dyspepsia  Assessment & Plan: We will start with Pepcid twice daily  Avoid trigger foods  Follow-up if no better      No follow-ups on file. SUBJECTIVE/OBJECTIVE:  HPI  Here for continued neck and left shoulder pain. Has been doing the stretches and exercises with minimal relief. She has been taking Advil with minimal relief. Pain is an achy/throbbing pain. 7 out of 10. Radiates to the left shoulder. No numbness or tingling in the extremities. Did have x-ray in  which showed moderate cervical arthritis. She also reports sore throat started about a week ago. Waxing and waning denies fevers. No pain with swallowing. No ear pain. No rhonirrhea or head congestion. Globus sensation present. Does report dyspepsia few days ago. She is also concerned for difficulty losing weight. She has gained about 30 pounds in the last 2 years. When she was first diagnosed with prediabetes she changed diet and was able to lose a significant amount of weight. She admits that she has not been focused on diet or exercise lately.     Current Outpatient Medications   Medication Sig Dispense Refill    predniSONE (DELTASONE) 20 MG tablet Take 2 tablets by mouth daily for 5 days 10 tablet 0    famotidine (PEPCID) 20 MG tablet Take 1 tablet by mouth 2 times daily 60 tablet 0    metFORMIN (GLUCOPHAGE-XR) 500 MG extended

## 2023-08-28 ENCOUNTER — HOSPITAL ENCOUNTER (OUTPATIENT)
Dept: PHYSICAL THERAPY | Age: 73
Setting detail: THERAPIES SERIES
Discharge: HOME | End: 2023-08-28
Payer: MEDICARE

## 2023-08-28 PROCEDURE — 97161 PT EVAL LOW COMPLEX 20 MIN: CPT | Performed by: PHYSICAL THERAPIST

## 2023-08-28 PROCEDURE — 97110 THERAPEUTIC EXERCISES: CPT | Performed by: PHYSICAL THERAPIST

## 2023-08-28 PROCEDURE — 97530 THERAPEUTIC ACTIVITIES: CPT | Performed by: PHYSICAL THERAPIST

## 2023-08-28 NOTE — FLOWSHEET NOTE
[x] Ther. Act (96660) 10 min 1  [] Mech. Traction (54855)     [] Gait (28878)    [] Dry Needle 1-2 muscle (25260)     [] Aquatic Therex (72139)    [] Dry Needle 3+ muscle (38631)     [] Iontophoresis (41903)    [] VASO (18321)     [] Ultrasound (32012)    [] Group Therapy (41741)     [] Estim Attended (44951)    [] Other: Total Timed Code Tx Minutes 27 min        Total Treatment Minutes 47 min       Charge Justification:  (58002) THERAPEUTIC EXERCISE - Provided verbal/tactile cueing for activities related to strengthening, flexibility, endurance, ROM performed to prevent loss of range of motion, maintain or improve muscular strength or increase flexibility, following either an injury or surgery. (32531) [facility]- Reviewed/Progressed HEP activities related to strengthening, flexibility, endurance, ROM performed to prevent loss of range of motion, maintain or improve muscular strength or increase flexibility, following either an injury or surgery. (18448) THERAPEUTIC ACTIVITY- use of dynamic activities to improve functional performance. (Ex include squatting, ascending/descending stairs, walking, bending, lifting, catching, throwing, pushing, pulling, jumping.)  Direct, one on one contact, billed in 15-minute increments. TREATMENT PLAN   Plan: POC Initiated today- see joselin for details    Electronically Signed by Rubin Moon PT  Date: 08/28/2023     Note: If patient does not return for scheduled/recommended follow up visits, this note will serve as a discharge from care along with the most recent update on progress.

## 2023-08-28 NOTE — PLAN OF CARE
Balance:  [x] WNL      [] NT       [] Dysfunction noted  Comment:     Falls Risk Assessment (30 days): Falls Risk assessed and no intervention required. Time Up and Go (TUG): Not Assessed     Review Of Systems (ROS):  [x] Performed Review of systems (Integumentary, CardioPulmonary, Neurological) by intake and observation. Intake form has been scanned into medical record. Patient has been instructed to contact their primary care physician regarding ROS issues if not already being addressed at this time. [x] Patient history, allergies, meds reviewed. Medical chart reviewed. See intake form.      Co-morbidities/Complexities (which will affect course of rehabilitation):  []None        Arthritic conditions  [] Rheumatoid arthritis (M05.9)  [x] Osteoarthritis (M19.91)  [] Gout        Neurological conditions  [] Prior Stroke (I69.30)  [] Parkinson's (G20)  [] Encephalopathy (G93.40)  [] MS (G35)  [] Post-polio (G14)  [] SCI  [] TBI  [] ALS    Gastrointestinal conditions   [] Constipation (K59.00)  [] Chron's/ulcerative colitis    Endocrine conditions   [] Hypothyroid (E03.9)  [] Hyperthyroid [] Hx of COVID    Musculoskeletal conditions  [] Disc pathology   [] Congenital spine pathologies   [] Osteoporosis (M81.8)  [] Osteopenia (M85.8)  [] Scoliosis    Cardio/Pulmonary conditions  [] Asthma (J45)  [] Coughing   [] COPD (J44.9)  [] CHF  [] A-fib          Rheumatological conditions  [] Fibromyalgia (M79.7)  [] Lupus  [] Sjogrens  [] Ankylosing spondylitis  [] Other autoimmune       Metabolic conditions  [] Morbid obesity (E66.01)  [x] Diabetes type 1(E10.65) or 2 (E11.65)   [] Neuropathy (G60.9)        Cardiovascular conditions  [] Hypertension (I10)  [] Hyperlipidemia (E78.5)  [] Angina pectoris (I20)  [] Atherosclerosis (I70)  [] Pacemaker/Defib  [] Hx of CABG/stent/cardiac surgeries        Developmental Disorders  [] Autism (F84.0)  [] Cerebral Palsy (G80)  [] Down Syndrome (Q90.9)  [] Developmental delay

## 2023-09-05 ENCOUNTER — OFFICE VISIT (OUTPATIENT)
Dept: FAMILY MEDICINE CLINIC | Age: 73
End: 2023-09-05
Payer: MEDICARE

## 2023-09-05 VITALS
TEMPERATURE: 98.5 F | HEART RATE: 72 BPM | DIASTOLIC BLOOD PRESSURE: 82 MMHG | HEIGHT: 65 IN | OXYGEN SATURATION: 99 % | WEIGHT: 184 LBS | SYSTOLIC BLOOD PRESSURE: 138 MMHG | BODY MASS INDEX: 30.66 KG/M2

## 2023-09-05 DIAGNOSIS — R73.03 PREDIABETES: ICD-10-CM

## 2023-09-05 DIAGNOSIS — M54.16 LUMBAR RADICULOPATHY: Primary | ICD-10-CM

## 2023-09-05 PROCEDURE — 1090F PRES/ABSN URINE INCON ASSESS: CPT | Performed by: NURSE PRACTITIONER

## 2023-09-05 PROCEDURE — G8417 CALC BMI ABV UP PARAM F/U: HCPCS | Performed by: NURSE PRACTITIONER

## 2023-09-05 PROCEDURE — G8399 PT W/DXA RESULTS DOCUMENT: HCPCS | Performed by: NURSE PRACTITIONER

## 2023-09-05 PROCEDURE — G8427 DOCREV CUR MEDS BY ELIG CLIN: HCPCS | Performed by: NURSE PRACTITIONER

## 2023-09-05 PROCEDURE — 1123F ACP DISCUSS/DSCN MKR DOCD: CPT | Performed by: NURSE PRACTITIONER

## 2023-09-05 PROCEDURE — 99214 OFFICE O/P EST MOD 30 MIN: CPT | Performed by: NURSE PRACTITIONER

## 2023-09-05 PROCEDURE — 3017F COLORECTAL CA SCREEN DOC REV: CPT | Performed by: NURSE PRACTITIONER

## 2023-09-05 PROCEDURE — 1036F TOBACCO NON-USER: CPT | Performed by: NURSE PRACTITIONER

## 2023-09-05 ASSESSMENT — ENCOUNTER SYMPTOMS
SHORTNESS OF BREATH: 0
SINUS PRESSURE: 0
DIARRHEA: 0
COUGH: 0
COLOR CHANGE: 0
CONSTIPATION: 0
BACK PAIN: 1
ABDOMINAL PAIN: 0
WHEEZING: 0
SINUS PAIN: 0

## 2023-09-05 NOTE — PROGRESS NOTES
Pepe Coyne (:  1950) is a 68 y.o. female,Established patient, here for evaluation of the following chief complaint(s):  Follow-up (Shoulder) and Numbness (Down right leg and feet)      ASSESSMENT/PLAN:  1. Lumbar radiculopathy  Assessment & Plan:  Continue NSAIDs as needed  We will refer to physical therapy  Orders:  -     [facility]- [facility] (Ortho & [facility] performance)- [place]  2. Prediabetes  Assessment & Plan:  Reassurance given  Recent high blood sugars likely related to prednisone  Continue diet and exercise  . Continue metformin      No follow-ups on file. SUBJECTIVE/OBJECTIVE:  HPI  Patient is here for concern of higher blood sugars as well as numbness in her right leg. States she was walking the other day and had to turn back because she began to notice numbness in her right foot. She does have history of lumbar stenosis. Symptoms improved after rest and taking Aleve. No bowel or bladder incontinence. No injury to the back. She is undergoing physical therapy for neck pain. She takes Aleve on occasion. She did require JOE to lower back years ago. A.m. blood sugars ranging from . She did do 5 days of prednisone recently for her neck pain. Prior to that blood sugars were in low 80s typically in the morning. She recently started metformin which she is tolerating well.     Current Outpatient Medications   Medication Sig Dispense Refill    famotidine (PEPCID) 20 MG tablet Take 1 tablet by mouth 2 times daily 60 tablet 0    metFORMIN (GLUCOPHAGE-XR) 500 MG extended release tablet Take 2 tablets by mouth daily (with breakfast) 30 tablet 5    NONFORMULARY G-150 - Benfotiamine      blood glucose test strips (FREESTYLE LITE) strip USE 1 STRIP TO CHECK GLUCOSE ONCE DAILY 100 each 5    glucose monitoring (FREESTYLE) kit 1 kit by Does not apply route 2 times daily Dx: E11.65 1 kit 0    Misc Natural Products (CHROMIUM PICOLINATE FORTIFIED PO) Take 500 mcg by mouth daily

## 2023-09-05 NOTE — ASSESSMENT & PLAN NOTE
Reassurance given  Recent high blood sugars likely related to prednisone  Continue diet and exercise  .   Continue metformin

## 2023-09-06 ENCOUNTER — HOSPITAL ENCOUNTER (OUTPATIENT)
Dept: PHYSICAL THERAPY | Age: 73
Setting detail: THERAPIES SERIES
Discharge: HOME | End: 2023-09-06
Payer: MEDICARE

## 2023-09-06 PROCEDURE — 97530 THERAPEUTIC ACTIVITIES: CPT | Performed by: PHYSICAL THERAPIST

## 2023-09-06 PROCEDURE — 97110 THERAPEUTIC EXERCISES: CPT | Performed by: PHYSICAL THERAPIST

## 2023-09-06 PROCEDURE — 97164 PT RE-EVAL EST PLAN CARE: CPT | Performed by: PHYSICAL THERAPIST

## 2023-09-06 NOTE — PLAN OF CARE
Following tests noted:    Luke Celso test: Bilateral tightness, Hamstring 90-90 straight leg raise test: bilateral tightness, lacking 20 deg from ext, and Carter's test (MEGHNA):  negative  Precautions/Contraindications for manual/other:    None    Balance:  [x] WNL      [] NT       [] Dysfunction noted  Comment:     Falls Risk Assessment (30 days): Falls Risk assessed and no intervention required. Time Up and Go (TUG): Not Assessed     Review Of Systems (ROS):  [x] Performed Review of systems (Integumentary, CardioPulmonary, Neurological) by intake and observation. Intake form has been scanned into medical record. Patient has been instructed to contact their primary care physician regarding ROS issues if not already being addressed at this time. [x] Patient history, allergies, meds reviewed. Medical chart reviewed. See intake form.      Co-morbidities/Complexities (which will affect course of rehabilitation): 2  []None        Arthritic conditions  [] Rheumatoid arthritis (M05.9)  [x] Osteoarthritis (M19.91)  [] Gout        Neurological conditions  [] Prior Stroke (I69.30)  [] Parkinson's (G20)  [] Encephalopathy (G93.40)  [] MS (G35)  [] Post-polio (G14)  [] SCI  [] TBI  [] ALS    Gastrointestinal conditions   [] Constipation (K59.00)  [] Chron's/ulcerative colitis    Endocrine conditions   [] Hypothyroid (E03.9)  [] Hyperthyroid [] Hx of COVID    Musculoskeletal conditions  [] Disc pathology   [] Congenital spine pathologies   [] Osteoporosis (M81.8)  [] Osteopenia (M85.8)  [] Scoliosis    Cardio/Pulmonary conditions  [] Asthma (J45)  [] Coughing   [] COPD (J44.9)  [] CHF  [] A-fib          Rheumatological conditions  [] Fibromyalgia (M79.7)  [] Lupus  [] Sjogrens  [] Ankylosing spondylitis  [] Other autoimmune       Metabolic conditions  [] Morbid obesity (E66.01)  [x] Diabetes type 1(E10.65) or 2 (E11.65)   [] Neuropathy (G60.9)        Cardiovascular conditions  [] Hypertension (I10)  [] Hyperlipidemia

## 2023-09-06 NOTE — FLOWSHEET NOTE
[address] [place] [address] and Therapy [facility] [address] [place] [address] office: [phone] fax: [phone]      Physical Therapy: TREATMENT/PROGRESS NOTE   Patient: Pepe Coyne (89 y.o. female)   Treatment Date: 2023   :  1950 MRN: [id number]   Visit #: 2    Insurance: Payor: MEDICARE / Plan: MEDICARE PART A AND B / Product Type: *No Product type* /   Insurance ID: [id number] - (Medicare)  Secondary Insurance (if applicable):  [facility]   Treatment Diagnosis:   M54.2 - Neck Pain  M54.16 - Lumbar Radiculopathy   Medical Diagnosis:    Neck pain [M54.2]  M54.16 (ICD-10-CM) - Lumbar radiculopathy   Referring Physician: HELLEN Dixon -*  PCP: Harjeet Bergeron MD                             Plan of care signed (Y/N): sent to inbox    Date of Patient follow up with Physician:      Progress Report: NO    Progress report due (10 Rx/or 30 days whichever is less):      Recertification due (POC duration/ or 90 days whichever is less): 10/9/2023     Visit # Insurance Allowable Auth Needed   2 By Medical Necessity []Yes    [x]No     Latex Allergy:  [x]NO      []YES  Preferred Language for Healthcare:   [x]English       []other:    SUBJECTIVE EXAMINATION     Patient Report/Comments: See Re-Eval    OBJECTIVE EXAMINATION     Observation: See Re-Eval    Test measurements:      Test used Initial score 2023   Pain Summary VAS 6/10    Functional questionnaire Neck Disability Index 17/50 =   34% impairment     Modified Oswestry (23) 14/50 = 28% impairment     ROM                        Strength                          RESTRICTIONS/PRECAUTIONS:     Exercises/Interventions:     Therapeutic Ex (10686)  resistance Sets/time Reps Notes/Cues/Progressions   HEP Trial  10 x each  PT educates and demonstrates                 Manual Intervention ([phone])  TIME                   NMR re-education (41575) resistance Sets/time Reps CUES NEEDED

## 2023-09-11 ENCOUNTER — HOSPITAL ENCOUNTER (OUTPATIENT)
Dept: PHYSICAL THERAPY | Age: 73
Setting detail: THERAPIES SERIES
Discharge: HOME | End: 2023-09-11
Payer: MEDICARE

## 2023-09-11 PROCEDURE — 97110 THERAPEUTIC EXERCISES: CPT | Performed by: PHYSICAL THERAPIST

## 2023-09-11 NOTE — FLOWSHEET NOTE
[address] [place] [address] and Therapy [facility] [address] Randall Li[address] [place] [address] office: [phone] fax: [phone]      Physical Therapy: TREATMENT/PROGRESS NOTE   Patient: Janet Yarbrough (12 y.o. female)   Treatment Date: 2023   :  1950 MRN: [id number]   Visit #: 3    Insurance: Payor: MEDICARE / Plan: MEDICARE PART A AND B / Product Type: *No Product type* /   Insurance ID: [id number] - (Medicare)  Secondary Insurance (if applicable): [facility]   Treatment Diagnosis:   M54.2 - Neck Pain  M54.16 - Lumbar Radiculopathy   Medical Diagnosis:    Neck pain [M54.2]  M54.16 (ICD-10-CM) - Lumbar radiculopathy   Referring Physician: HELLEN Green -*  PCP: Eldon Beltran MD                             Plan of care signed (Y/N): Yes    Date of Patient follow up with Physician: 2023     Progress Report: NO    Progress report due (10 Rx/or 30 days whichever is less):      Recertification due (POC duration/ or 90 days whichever is less): 10/9/2023     Visit # Insurance Allowable Auth Needed   3 By Medical Necessity []Yes    [x]No     Latex Allergy:  [x]NO      []YES  Preferred Language for Healthcare:   [x]English       []other:    SUBJECTIVE EXAMINATION     Patient Report/Comments: Pt reports that her neck feels like it is on the right track. Pt states that low back stretching helps for about 4 hours and then stiffness and cramping returns.     OBJECTIVE EXAMINATION     Observation: Pt tolerates exercise progression well    Test measurements:      Test used Initial score 2023   Pain Summary VAS 6/10 8/10 worst   (in the morning)   Functional questionnaire Neck Disability Index 17/50 =   34% impairment     Modified Oswestry (23) 14/50 = 28% impairment     ROM                        Strength                          RESTRICTIONS/PRECAUTIONS:     Exercises/Interventions:     Therapeutic Ex (35842)  Resist Sets/time Reps

## 2023-09-13 ENCOUNTER — OFFICE VISIT (OUTPATIENT)
Dept: FAMILY MEDICINE CLINIC | Age: 73
End: 2023-09-13
Payer: MEDICARE

## 2023-09-13 ENCOUNTER — HOSPITAL ENCOUNTER (OUTPATIENT)
Dept: PHYSICAL THERAPY | Age: 73
Setting detail: THERAPIES SERIES
Discharge: HOME | End: 2023-09-13
Payer: MEDICARE

## 2023-09-13 VITALS
HEART RATE: 99 BPM | BODY MASS INDEX: 30.32 KG/M2 | WEIGHT: 182 LBS | DIASTOLIC BLOOD PRESSURE: 80 MMHG | HEIGHT: 65 IN | OXYGEN SATURATION: 97 % | SYSTOLIC BLOOD PRESSURE: 124 MMHG

## 2023-09-13 DIAGNOSIS — G89.29 CHRONIC LEFT SHOULDER PAIN: ICD-10-CM

## 2023-09-13 DIAGNOSIS — R73.03 PREDIABETES: Primary | ICD-10-CM

## 2023-09-13 DIAGNOSIS — Z91.81 AT HIGH RISK FOR FALLS: ICD-10-CM

## 2023-09-13 DIAGNOSIS — M25.512 CHRONIC LEFT SHOULDER PAIN: ICD-10-CM

## 2023-09-13 PROCEDURE — 1090F PRES/ABSN URINE INCON ASSESS: CPT | Performed by: FAMILY MEDICINE

## 2023-09-13 PROCEDURE — 1123F ACP DISCUSS/DSCN MKR DOCD: CPT | Performed by: FAMILY MEDICINE

## 2023-09-13 PROCEDURE — G8417 CALC BMI ABV UP PARAM F/U: HCPCS | Performed by: FAMILY MEDICINE

## 2023-09-13 PROCEDURE — 97110 THERAPEUTIC EXERCISES: CPT | Performed by: PHYSICAL THERAPIST

## 2023-09-13 PROCEDURE — 3017F COLORECTAL CA SCREEN DOC REV: CPT | Performed by: FAMILY MEDICINE

## 2023-09-13 PROCEDURE — G8427 DOCREV CUR MEDS BY ELIG CLIN: HCPCS | Performed by: FAMILY MEDICINE

## 2023-09-13 PROCEDURE — 99213 OFFICE O/P EST LOW 20 MIN: CPT | Performed by: FAMILY MEDICINE

## 2023-09-13 PROCEDURE — G8399 PT W/DXA RESULTS DOCUMENT: HCPCS | Performed by: FAMILY MEDICINE

## 2023-09-13 PROCEDURE — 1036F TOBACCO NON-USER: CPT | Performed by: FAMILY MEDICINE

## 2023-09-13 RX ORDER — LANCETS
1 EACH MISCELLANEOUS DAILY
Qty: 100 EACH | Refills: 3 | Status: SHIPPED | OUTPATIENT
Start: 2023-09-13

## 2023-09-13 RX ORDER — BLOOD SUGAR DIAGNOSTIC
1 STRIP MISCELLANEOUS DAILY
Qty: 100 EACH | Refills: 3 | Status: SHIPPED | OUTPATIENT
Start: 2023-09-13

## 2023-09-13 ASSESSMENT — PATIENT HEALTH QUESTIONNAIRE - PHQ9
SUM OF ALL RESPONSES TO PHQ QUESTIONS 1-9: 0
1. LITTLE INTEREST OR PLEASURE IN DOING THINGS: 0
SUM OF ALL RESPONSES TO PHQ9 QUESTIONS 1 & 2: 0
2. FEELING DOWN, DEPRESSED OR HOPELESS: 0
SUM OF ALL RESPONSES TO PHQ QUESTIONS 1-9: 0

## 2023-09-13 NOTE — PROGRESS NOTES
mcg/0.5mL 12/03/2021    COVID-19, MODERNA Bivalent, (age 12y+), IM, 48 mcg/0.5 mL 10/17/2022    Influenza 09/28/2011, 10/08/2013    Influenza Virus Vaccine 10/16/2014    Influenza, FLUARIX, FLULAVAL, FLUZONE (age 10 mo+) AND AFLURIA, (age 1 y+), PF, 0.5mL 12/06/2021    Influenza, FLUBLOK, (age 25 y+), PF, 0.5mL 09/30/2020    Influenza, FLUZONE (age 72 y+), High Dose, 0.7mL 10/17/2022, 09/12/2023    Influenza, High Dose (Fluzone 65 yrs and older) 09/17/2015, 10/12/2018, 12/06/2021    Pneumococcal, PCV-13, PREVNAR 13, (age 6w+), IM, 0.5mL 09/17/2015    Pneumococcal, PPSV23, PNEUMOVAX 23, (age 2y+), SC/IM, 0.5mL 09/28/2011, 04/02/2018    TDaP, ADACEL (age 6y-58y), BOOSTRIX (age 10y+), IM, 0.5mL 03/12/2013    Tetanus 08/07/2008    Zoster Live (Zostavax) 10/02/2012    Zoster Recombinant (Shingrix) 11/09/2022, 02/07/2023       Review of Systems  Review of Systems   Constitutional:  Negative for fever. Musculoskeletal:  Negative for gout and stiffness. Skin:  Negative for itching. Neurological:  Negative for tingling and numbness. Objective:   Physical Exam  Physical Exam  Vitals reviewed. Constitutional:       General: She is not in acute distress. Appearance: She is well-developed. HENT:      Head: Normocephalic. Right Ear: Tympanic membrane and ear canal normal.      Left Ear: Tympanic membrane and ear canal normal.      Nose: No rhinorrhea. Mouth/Throat:      Pharynx: No oropharyngeal exudate or posterior oropharyngeal erythema. Eyes:      General:         Right eye: No discharge. Left eye: No discharge. Conjunctiva/sclera: Conjunctivae normal.      Pupils: Pupils are equal, round, and reactive to light. Neck:      Thyroid: No thyromegaly. Vascular: No carotid bruit or JVD. Trachea: No tracheal deviation. Cardiovascular:      Rate and Rhythm: Normal rate and regular rhythm. Pulses: Normal pulses. Heart sounds: Normal heart sounds. No murmur heard.

## 2023-09-13 NOTE — FLOWSHEET NOTE
[address] [place] [address] and Therapy [facility] [address] Nola Srinivasa[address] [place] [address] office: [phone] fax: [phone]      Physical Therapy: TREATMENT/PROGRESS NOTE   Patient: Nadeem Keyes (39 y.o. female)   Treatment Date: 2023   :  1950 MRN: [id number]   Visit #: 4    Insurance: Payor: MEDICARE / Plan: MEDICARE PART A AND B / Product Type: *No Product type* /   Insurance ID: [id number] - (Medicare)  Secondary Insurance (if applicable): [facility]   Treatment Diagnosis:   M54.2 - Neck Pain  M54.16 - Lumbar Radiculopathy   Medical Diagnosis:    Neck pain [M54.2]  M54.16 (ICD-10-CM) - Lumbar radiculopathy   Referring Physician: HELLEN Gonzalez -*  PCP: Rd Ghotra MD                             Plan of care signed (Y/N): Yes    Date of Patient follow up with Physician: 2023     Progress Report: NO    Progress report due (10 Rx/or 30 days whichever is less): 3/32/9065     Recertification due (POC duration/ or 90 days whichever is less): 10/9/2023     Visit # Insurance Allowable Auth Needed   4 By Medical Necessity []Yes    [x]No     Latex Allergy:  [x]NO      []YES  Preferred Language for Healthcare:   [x]English       []other:    SUBJECTIVE EXAMINATION     Patient Report/Comments: Pt c/o low back soreness today from walking more. States that she is doing well with HEP.      OBJECTIVE EXAMINATION     Observation: Pt tolerates exercise progression well    Test measurements:      Test used Initial score 2023   Pain Summary VAS 6/10 8/10 worst   (in the morning)   Functional questionnaire Neck Disability Index 17/50 =   34% impairment     Modified Oswestry (23) 14/50 = 28% impairment     ROM                        Strength                          RESTRICTIONS/PRECAUTIONS:     Exercises/Interventions:     Therapeutic Ex (60810)  Resist Sets/time Reps Notes/Cues/Progressions   Supine Low Trunk Rotation - 5\" 10 x each, R/L PT

## 2023-09-19 ENCOUNTER — HOSPITAL ENCOUNTER (OUTPATIENT)
Dept: PHYSICAL THERAPY | Age: 73
Setting detail: THERAPIES SERIES
Discharge: HOME | End: 2023-09-19
Payer: MEDICARE

## 2023-09-19 PROCEDURE — 97110 THERAPEUTIC EXERCISES: CPT | Performed by: PHYSICAL THERAPIST

## 2023-09-19 RX ORDER — LANCETS
1 EACH MISCELLANEOUS DAILY
Qty: 100 EACH | Refills: 3 | OUTPATIENT
Start: 2023-09-19

## 2023-09-19 RX ORDER — BLOOD SUGAR DIAGNOSTIC
1 STRIP MISCELLANEOUS DAILY
Qty: 100 EACH | Refills: 3 | OUTPATIENT
Start: 2023-09-19

## 2023-09-19 NOTE — FLOWSHEET NOTE
[address] [place] [address] and Therapy [address] [facility] [address] [place] [address] office: [phone] fax: [phone]      Physical Therapy: TREATMENT/PROGRESS NOTE   Patient: Alex Mirza (51 y.o. female)   Treatment Date: 2023   :  1950 MRN: [id number]   Visit #: 5    Insurance: Payor: MEDICARE / Plan: MEDICARE PART A AND B / Product Type: *No Product type* /   Insurance ID: [id number] - (Medicare)  Secondary Insurance (if applicable): [facility]   Treatment Diagnosis:   M54.2 - Neck Pain  M54.16 - Lumbar Radiculopathy   Medical Diagnosis:    Neck pain [M54.2]  M54.16 (ICD-10-CM) - Lumbar radiculopathy   Referring Physician: HELLEN Huynh -*  PCP: Dirk Orozco MD                             Plan of care signed (Y/N): Yes    Date of Patient follow up with Physician: 2023     Progress Report: NO    Progress report due (10 Rx/or 30 days whichever is less):      Recertification due (POC duration/ or 90 days whichever is less): 10/9/2023     Visit # Insurance Allowable Auth Needed   5 By Medical Necessity []Yes    [x]No     Latex Allergy:  [x]NO      []YES  Preferred Language for Healthcare:   [x]English       []other:    SUBJECTIVE EXAMINATION     Patient Report/Comments: Pt reports not being very compliant this past week. States that she is having soreness in low back. OBJECTIVE EXAMINATION     Observation:  Pt requires min-mod verbal cues for technique.      Test measurements:      Test used Initial score 2023   Pain Summary VAS 6/10 4/10   Functional questionnaire Neck Disability Index 17/50 =   34% impairment     Modified Oswestry (23) 14/50 = 28% impairment     ROM                        Strength                          RESTRICTIONS/PRECAUTIONS:     Exercises/Interventions:     Therapeutic Ex (55353)  Resist Sets/time Reps Notes/Cues/Progressions   Supine Low Trunk Rotation - 5\" 10 x each, R/L PT monitors

## 2023-09-21 ENCOUNTER — HOSPITAL ENCOUNTER (OUTPATIENT)
Dept: PHYSICAL THERAPY | Age: 73
Setting detail: THERAPIES SERIES
Discharge: HOME | End: 2023-09-21
Payer: MEDICARE

## 2023-09-21 PROCEDURE — 97110 THERAPEUTIC EXERCISES: CPT | Performed by: PHYSICAL THERAPIST

## 2023-09-21 PROCEDURE — 97112 NEUROMUSCULAR REEDUCATION: CPT | Performed by: PHYSICAL THERAPIST

## 2023-09-21 NOTE — FLOWSHEET NOTE
[address] [place] [address] and Therapy   [facility] OLIVIA Harris., [address] [place] [address]   office: [phone] fax: [phone]      Physical Therapy: TREATMENT/PROGRESS NOTE   Patient: Xavi Singh (36 y.o. female)   Treatment Date: 2023   :  1950 MRN: [id number]   Visit #: 6    Insurance: Payor: MEDICARE / Plan: MEDICARE PART A AND B / Product Type: *No Product type* /   Insurance ID: [id number] - (Medicare)  Secondary Insurance (if applicable): [facility]   Treatment Diagnosis:   M54.2 - Neck Pain  M54.16 - Lumbar Radiculopathy   Medical Diagnosis:    Neck pain [M54.2]  M54.16 (ICD-10-CM) - Lumbar radiculopathy   Referring Physician: HELLEN Morley -*  PCP: Christine Bhatti MD                             Plan of care signed (Y/N): Yes    Date of Patient follow up with Physician: 2023     Progress Report: NO    Progress report due (10 Rx/or 30 days whichever is less):      Recertification due (POC duration/ or 90 days whichever is less): 10/9/2023     Visit # Insurance Allowable Auth Needed   6 By Medical Necessity []Yes    [x]No     Latex Allergy:  [x]NO      []YES  Preferred Language for Healthcare:   [x]English       []other:    SUBJECTIVE EXAMINATION     Patient Report/Comments: Pt states that she is doing okay. Reports continued stiffness and soreness in the mornings. OBJECTIVE EXAMINATION     Observation:  Pt requires min-mod verbal cues for technique.      Test measurements:      Test used Initial score 2023   Pain Summary VAS 6/10 2/10   Functional questionnaire Neck Disability Index 17/50 =   34% impairment     Modified Oswestry (23) 14/50 = 28% impairment     ROM                        Strength                          RESTRICTIONS/PRECAUTIONS:     Exercises/Interventions:     Therapeutic Ex (85860)  Resist Sets/time Reps Notes/Cues/Progressions   Supine Low Trunk Rotation - 5\" 10 x each, R/L    Supine Figure

## 2023-09-26 ENCOUNTER — HOSPITAL ENCOUNTER (OUTPATIENT)
Dept: PHYSICAL THERAPY | Age: 73
Setting detail: THERAPIES SERIES
Discharge: HOME | End: 2023-09-26
Payer: MEDICARE

## 2023-09-26 NOTE — FLOWSHEET NOTE
[facility] Outpatient Therapy  [address] [place] [address], [address] [place] [address], [address] [place] [address] Avenue  Phone: [phone]   Fax: [phone]    Physical Therapy Missed Visit Note     Date:  2023    Patient Name:  Megan Michael      :  1950    MRN: [id number]      Cancelled visits to date: 1  No-shows to date: 0    For today's appointment patient:  [x]  Cancelled  []  Rescheduled appointment  []  No-show     Reason given by patient:  []  Patient ill  []  Conflicting appointment  []  No transportation    []  Conflict with work  [x]  No reason given  []  Other:     Comments:      Electronically signed by:  Neil Diaz PT, PT

## 2023-09-28 ENCOUNTER — HOSPITAL ENCOUNTER (OUTPATIENT)
Dept: PHYSICAL THERAPY | Age: 73
Setting detail: THERAPIES SERIES
Discharge: HOME | End: 2023-09-28
Payer: MEDICARE

## 2023-09-28 NOTE — PLAN OF CARE
10 x each, R/L    Supine Figure 4 Piriformis Stretch - 10\" 10 x each, R/L PT cues for technique   Supine Hamstring Stretch  10\" 10 x each, R/L PT cues sciatic nerve glide   Supine Single Knee to Opp. Shoulder - 10\" 10 x each, R/L PT cues for technique   Quadruped Cat Camel -   PT gives tactile cues   Seated Lumbar Flexion - Segmental  5\" 10 x  PT cues for technique   Seated Lumbar Rotation Stretch  10\" 5 x each, R/L           Therapeutic Activity (53270)                     NMR re-education (73603)       Transverse Abdominus Isometric  3\" 10 x    TrA Isometric with Marches  3\" 5 x ea, R/L    TrA + Bridges  3\" 10 x     Resisted Anti-Rotation Press Red TB 1 10 x each, R/L           Manual Intervention (27934)   Time                     Modalities:    No modalities applied this session    Education/Home Exercise Program: Patient HEP program created electronically. Access Code: [email or web address]  URL: [email or web address]  Date: 08/28/2023  Prepared by: Yan Saldivar    Exercises  - Doorway Pec Stretch at 90 Degrees Abduction  - 2 x daily - 7 x weekly - 10 reps - 10 hold  - Gentle Levator Scapulae Stretch  - 2 x daily - 7 x weekly - 10 reps - 10 hold  - Seated Upper Trapezius Stretch  - 2 x daily - 7 x weekly - 10 reps - 10 hold  - Standing Scapular Depression  - 2 x daily - 7 x weekly - 1-2 sets - 10 reps    Date: 09/07/2023  Prepared by: Yan Saldivar    Exercises  - Supine Lower Trunk Rotation  - 2 x daily - 7 x weekly - 10 reps - 3 hold  - Supine Figure 4 Piriformis Stretch  - 2 x daily - 7 x weekly - 10 reps - 10 hold  - Supine Hamstring Stretch  - 2 x daily - 7 x weekly - 10 reps - 10 hold  - Supine Sciatic Nerve Glide  - 2 x daily - 7 x weekly - 10 reps  - Supine Single Knee to Chest Stretch  - 2 x daily - 7 x weekly - 10 reps - 10 hold    ASSESSMENT     Today's Assessment: During therapy this date, patient required verbal cueing, tactile cueing, muscle facilitation, and modification of technique for improving

## 2023-10-02 ENCOUNTER — HOSPITAL ENCOUNTER (OUTPATIENT)
Dept: PHYSICAL THERAPY | Age: 73
Setting detail: THERAPIES SERIES
Discharge: HOME | End: 2023-10-02
Payer: MEDICARE

## 2023-10-02 PROCEDURE — 97110 THERAPEUTIC EXERCISES: CPT | Performed by: PHYSICAL THERAPIST

## 2023-10-02 PROCEDURE — 97112 NEUROMUSCULAR REEDUCATION: CPT | Performed by: PHYSICAL THERAPIST

## 2023-10-02 NOTE — FLOWSHEET NOTE
[address] [place] [address] and Therapy   [facility] [address] Velda Fabry., [address] [place] [address]   office: [phone] fax: [phone]      Physical Therapy: TREATMENT/PROGRESS NOTE   Patient: David Castillo (72 y.o. female)   Treatment Date: 10/02/2023   :  1950 MRN: [id number]   Visit #: 8    Insurance: Payor: MEDICARE / Plan: MEDICARE PART A AND B / Product Type: *No Product type* /   Insurance ID: [id number] - (Medicare)  Secondary Insurance (if applicable): [facility]   Treatment Diagnosis:   M54.2 - Neck Pain  M54.16 - Lumbar Radiculopathy   Medical Diagnosis:    Neck pain [M54.2]  M54.16 (ICD-10-CM) - Lumbar radiculopathy   Referring Physician: HELLEN Hickman -*  PCP: Jeremy Zapata MD                             Plan of care signed (Y/N): Yes    Date of Patient follow up with Physician: 2023     Progress Report: NO    Progress report due (10 Rx/or 30 days whichever is less):     Recertification due (POC duration/ or 90 days whichever is less): 10/9/2023     Visit # Insurance Allowable Auth Needed   8 By Medical Necessity []Yes    [x]No     Latex Allergy:  [x]NO      []YES  Preferred Language for Healthcare:   [x]English       []other:    SUBJECTIVE EXAMINATION     Patient Report/Comments: Pt c/o R hamstring cramp this morning. OBJECTIVE EXAMINATION     Observation:  Pt requires min-mod verbal cues for technique.      Test measurements:      Test used Initial score 10/02/2023   Pain Summary VAS 6/10 2/10 low back, hamstring   Functional questionnaire Neck Disability Index 17/50 =   34% impairment     Modified Oswestry (23) 14/50 = 28% impairment     ROM                        Strength                          RESTRICTIONS/PRECAUTIONS:     Exercises/Interventions:     Therapeutic Ex (54276)  Resist Sets/time Reps Notes/Cues/Progressions   Supine Low Trunk Rotation - 5\" 10 x each, R/L    Supine Figure 4 Piriformis Stretch - 10\" 10 x

## 2023-10-09 ENCOUNTER — HOSPITAL ENCOUNTER (OUTPATIENT)
Dept: PHYSICAL THERAPY | Age: 73
Setting detail: THERAPIES SERIES
Discharge: HOME | End: 2023-10-09
Payer: MEDICARE

## 2023-10-09 PROCEDURE — 97110 THERAPEUTIC EXERCISES: CPT | Performed by: PHYSICAL THERAPIST

## 2023-10-09 PROCEDURE — 97112 NEUROMUSCULAR REEDUCATION: CPT | Performed by: PHYSICAL THERAPIST

## 2023-10-09 NOTE — PLAN OF CARE
[address] [place] [address] and Therapy   [facility] [address] Angelita Alvarado[address] [place] [address]   office: [phone] fax: [phone]      Physical Therapy: TREATMENT/PROGRESS NOTE   Patient: Dayana Vance (46 y.o. female)   Treatment Date: 10/09/2023   :  1950 MRN: [id number]   Visit #: 9    Insurance: Payor: MEDICARE / Plan: MEDICARE PART A AND B / Product Type: *No Product type* /   Insurance ID: [id number] - (Medicare)  Secondary Insurance (if applicable): [facility]   Treatment Diagnosis:   M54.2 - Neck Pain  M54.16 - Lumbar Radiculopathy   Medical Diagnosis:    Neck pain [M54.2]  M54.16 (ICD-10-CM) - Lumbar radiculopathy   Referring Physician: HELLEN Yost -*  PCP: Lillian Vera MD                             Plan of care signed (Y/N): Yes    Date of Patient follow up with Physician: 2023     Progress Report: YES    Progress report due (10 Rx/or 30 days whichever is less):     Recertification due (POC duration/ or 90 days whichever is less): 10/9/2023     Visit # Insurance Allowable Auth Needed   9 By Medical Necessity []Yes    [x]No     Latex Allergy:  [x]NO      []YES  Preferred Language for Healthcare:   [x]English       []other:    SUBJECTIVE EXAMINATION     Patient Report/Comments: Pt reports that she is doing well, with mild soreness in the mornings that she is able to manage with daily stretching. OBJECTIVE EXAMINATION     Observation:  Pt requires minimal cues for therapeutic exercise. Exhibits increased cramping and tightness in RLE, specifically hamstring and calf.     Test measurements:      Test used Initial score 10/09/2023   Pain Summary VAS 6/10 2/10 low back, hamstring   Functional questionnaire Neck Disability Index 17/50 =   34% impairment 250 = 4% impairment    Modified Oswestry (23) 14/50 = 28% impairment     ROM   Cervical and Lumbar AROM WFL in all directions      Tightness in R hamstring

## 2023-11-02 DIAGNOSIS — R73.03 PREDIABETES: ICD-10-CM

## 2023-11-02 LAB
ANION GAP SERPL CALCULATED.3IONS-SCNC: 11 MMOL/L (ref 3–16)
BUN SERPL-MCNC: 14 MG/DL (ref 7–20)
CALCIUM SERPL-MCNC: 9.9 MG/DL (ref 8.3–10.6)
CHLORIDE SERPL-SCNC: 105 MMOL/L (ref 99–110)
CO2 SERPL-SCNC: 27 MMOL/L (ref 21–32)
CREAT SERPL-MCNC: 1 MG/DL (ref 0.6–1.2)
CREAT UR-MCNC: 24.4 MG/DL (ref 28–259)
GFR SERPLBLD CREATININE-BSD FMLA CKD-EPI: 59 ML/MIN/{1.73_M2}
GLUCOSE SERPL-MCNC: 104 MG/DL (ref 70–99)
MICROALBUMIN UR DL<=1MG/L-MCNC: <1.2 MG/DL
MICROALBUMIN/CREAT UR: ABNORMAL MG/G (ref 0–30)
POTASSIUM SERPL-SCNC: 5 MMOL/L (ref 3.5–5.1)
SODIUM SERPL-SCNC: 143 MMOL/L (ref 136–145)

## 2023-11-03 LAB
EST. AVERAGE GLUCOSE BLD GHB EST-MCNC: 128.4 MG/DL
HBA1C MFR BLD: 6.1 %

## 2023-11-27 RX ORDER — METFORMIN HYDROCHLORIDE 500 MG/1
1000 TABLET, EXTENDED RELEASE ORAL
Qty: 30 TABLET | Refills: 5 | Status: SHIPPED | OUTPATIENT
Start: 2023-11-27

## 2023-12-28 ENCOUNTER — OFFICE VISIT (OUTPATIENT)
Dept: FAMILY MEDICINE CLINIC | Age: 73
End: 2023-12-28
Payer: MEDICARE

## 2023-12-28 VITALS
WEIGHT: 182.2 LBS | HEART RATE: 78 BPM | BODY MASS INDEX: 30.32 KG/M2 | OXYGEN SATURATION: 98 % | DIASTOLIC BLOOD PRESSURE: 82 MMHG | SYSTOLIC BLOOD PRESSURE: 118 MMHG

## 2023-12-28 DIAGNOSIS — J06.9 VIRAL URI: Primary | ICD-10-CM

## 2023-12-28 PROCEDURE — 99213 OFFICE O/P EST LOW 20 MIN: CPT | Performed by: STUDENT IN AN ORGANIZED HEALTH CARE EDUCATION/TRAINING PROGRAM

## 2023-12-28 PROCEDURE — 3017F COLORECTAL CA SCREEN DOC REV: CPT | Performed by: STUDENT IN AN ORGANIZED HEALTH CARE EDUCATION/TRAINING PROGRAM

## 2023-12-28 PROCEDURE — 1123F ACP DISCUSS/DSCN MKR DOCD: CPT | Performed by: STUDENT IN AN ORGANIZED HEALTH CARE EDUCATION/TRAINING PROGRAM

## 2023-12-28 PROCEDURE — G8399 PT W/DXA RESULTS DOCUMENT: HCPCS | Performed by: STUDENT IN AN ORGANIZED HEALTH CARE EDUCATION/TRAINING PROGRAM

## 2023-12-28 PROCEDURE — 1036F TOBACCO NON-USER: CPT | Performed by: STUDENT IN AN ORGANIZED HEALTH CARE EDUCATION/TRAINING PROGRAM

## 2023-12-28 PROCEDURE — 1090F PRES/ABSN URINE INCON ASSESS: CPT | Performed by: STUDENT IN AN ORGANIZED HEALTH CARE EDUCATION/TRAINING PROGRAM

## 2023-12-28 PROCEDURE — G8484 FLU IMMUNIZE NO ADMIN: HCPCS | Performed by: STUDENT IN AN ORGANIZED HEALTH CARE EDUCATION/TRAINING PROGRAM

## 2023-12-28 PROCEDURE — G8417 CALC BMI ABV UP PARAM F/U: HCPCS | Performed by: STUDENT IN AN ORGANIZED HEALTH CARE EDUCATION/TRAINING PROGRAM

## 2023-12-28 PROCEDURE — G8427 DOCREV CUR MEDS BY ELIG CLIN: HCPCS | Performed by: STUDENT IN AN ORGANIZED HEALTH CARE EDUCATION/TRAINING PROGRAM

## 2023-12-28 RX ORDER — GUAIFENESIN/DEXTROMETHORPHAN 100-10MG/5
5 SYRUP ORAL 3 TIMES DAILY PRN
Qty: 120 ML | Refills: 3 | Status: SHIPPED | OUTPATIENT
Start: 2023-12-28 | End: 2024-01-29

## 2023-12-28 RX ORDER — PSEUDOEPHEDRINE HCL 120 MG/1
120 TABLET, FILM COATED, EXTENDED RELEASE ORAL 2 TIMES DAILY PRN
Qty: 30 TABLET | Refills: 1 | Status: SHIPPED | OUTPATIENT
Start: 2023-12-28 | End: 2024-01-27

## 2023-12-28 RX ORDER — NAPROXEN 250 MG/1
250 TABLET ORAL 2 TIMES DAILY PRN
Qty: 180 TABLET | Refills: 2 | Status: SHIPPED | OUTPATIENT
Start: 2023-12-28

## 2023-12-28 RX ORDER — LORATADINE 10 MG/1
10 TABLET ORAL DAILY
Qty: 30 TABLET | Refills: 3 | Status: SHIPPED | OUTPATIENT
Start: 2023-12-28 | End: 2024-04-26

## 2024-02-13 RX ORDER — METFORMIN HYDROCHLORIDE 500 MG/1
TABLET, EXTENDED RELEASE ORAL
Qty: 120 TABLET | Refills: 0 | Status: SHIPPED | OUTPATIENT
Start: 2024-02-13

## 2024-06-06 RX ORDER — METFORMIN HYDROCHLORIDE 500 MG/1
TABLET, EXTENDED RELEASE ORAL
Qty: 120 TABLET | Refills: 0 | Status: SHIPPED | OUTPATIENT
Start: 2024-06-06

## 2024-06-13 ENCOUNTER — HOSPITAL ENCOUNTER (OUTPATIENT)
Dept: MAMMOGRAPHY | Age: 74
Discharge: HOME | End: 2024-06-13
Payer: MEDICARE

## 2024-06-13 DIAGNOSIS — Z12.31 VISIT FOR SCREENING MAMMOGRAM: ICD-10-CM

## 2024-06-13 PROCEDURE — 77063 BREAST TOMOSYNTHESIS BI: CPT

## 2024-06-25 ENCOUNTER — OFFICE VISIT (OUTPATIENT)
Dept: FAMILY MEDICINE CLINIC | Age: 74
End: 2024-06-25
Payer: MEDICARE

## 2024-06-25 VITALS
OXYGEN SATURATION: 100 % | HEART RATE: 70 BPM | DIASTOLIC BLOOD PRESSURE: 80 MMHG | HEIGHT: 65 IN | WEIGHT: 186.4 LBS | BODY MASS INDEX: 31.06 KG/M2 | SYSTOLIC BLOOD PRESSURE: 120 MMHG

## 2024-06-25 DIAGNOSIS — R73.03 PREDIABETES: ICD-10-CM

## 2024-06-25 DIAGNOSIS — F17.200 SMOKER: ICD-10-CM

## 2024-06-25 DIAGNOSIS — Z00.00 MEDICARE ANNUAL WELLNESS VISIT, SUBSEQUENT: Primary | ICD-10-CM

## 2024-06-25 DIAGNOSIS — I65.23 BILATERAL CAROTID ARTERY STENOSIS: ICD-10-CM

## 2024-06-25 DIAGNOSIS — E01.0 THYROMEGALY: ICD-10-CM

## 2024-06-25 LAB
BASOPHILS # BLD: 0 K/UL (ref 0–0.2)
BASOPHILS NFR BLD: 0.9 %
DEPRECATED RDW RBC AUTO: 15.6 % (ref 12.4–15.4)
EOSINOPHIL # BLD: 0.1 K/UL (ref 0–0.6)
EOSINOPHIL NFR BLD: 1.7 %
HCT VFR BLD AUTO: 38.5 % (ref 36–48)
HGB BLD-MCNC: 12.5 G/DL (ref 12–16)
LYMPHOCYTES # BLD: 2.4 K/UL (ref 1–5.1)
LYMPHOCYTES NFR BLD: 56.7 %
MCH RBC QN AUTO: 27.9 PG (ref 26–34)
MCHC RBC AUTO-ENTMCNC: 32.3 G/DL (ref 31–36)
MCV RBC AUTO: 86.2 FL (ref 80–100)
MONOCYTES # BLD: 0.3 K/UL (ref 0–1.3)
MONOCYTES NFR BLD: 7.3 %
NEUTROPHILS # BLD: 1.4 K/UL (ref 1.7–7.7)
NEUTROPHILS NFR BLD: 33.4 %
PLATELET # BLD AUTO: 184 K/UL (ref 135–450)
PMV BLD AUTO: 10.5 FL (ref 5–10.5)
RBC # BLD AUTO: 4.47 M/UL (ref 4–5.2)
WBC # BLD AUTO: 4.3 K/UL (ref 4–11)

## 2024-06-25 PROCEDURE — 3017F COLORECTAL CA SCREEN DOC REV: CPT | Performed by: FAMILY MEDICINE

## 2024-06-25 PROCEDURE — 1123F ACP DISCUSS/DSCN MKR DOCD: CPT | Performed by: FAMILY MEDICINE

## 2024-06-25 PROCEDURE — G0439 PPPS, SUBSEQ VISIT: HCPCS | Performed by: FAMILY MEDICINE

## 2024-06-25 RX ORDER — METFORMIN HYDROCHLORIDE 500 MG/1
TABLET, EXTENDED RELEASE ORAL
Qty: 180 TABLET | Refills: 3 | Status: SHIPPED | OUTPATIENT
Start: 2024-06-25

## 2024-06-25 SDOH — ECONOMIC STABILITY: INCOME INSECURITY: HOW HARD IS IT FOR YOU TO PAY FOR THE VERY BASICS LIKE FOOD, HOUSING, MEDICAL CARE, AND HEATING?: NOT HARD AT ALL

## 2024-06-25 SDOH — ECONOMIC STABILITY: FOOD INSECURITY: WITHIN THE PAST 12 MONTHS, THE FOOD YOU BOUGHT JUST DIDN'T LAST AND YOU DIDN'T HAVE MONEY TO GET MORE.: NEVER TRUE

## 2024-06-25 SDOH — ECONOMIC STABILITY: FOOD INSECURITY: WITHIN THE PAST 12 MONTHS, YOU WORRIED THAT YOUR FOOD WOULD RUN OUT BEFORE YOU GOT MONEY TO BUY MORE.: NEVER TRUE

## 2024-06-25 ASSESSMENT — LIFESTYLE VARIABLES
HOW OFTEN DO YOU HAVE A DRINK CONTAINING ALCOHOL: NEVER
HOW MANY STANDARD DRINKS CONTAINING ALCOHOL DO YOU HAVE ON A TYPICAL DAY: PATIENT DOES NOT DRINK

## 2024-06-25 ASSESSMENT — PATIENT HEALTH QUESTIONNAIRE - PHQ9
SUM OF ALL RESPONSES TO PHQ QUESTIONS 1-9: 0
SUM OF ALL RESPONSES TO PHQ9 QUESTIONS 1 & 2: 0
2. FEELING DOWN, DEPRESSED OR HOPELESS: NOT AT ALL
SUM OF ALL RESPONSES TO PHQ QUESTIONS 1-9: 0

## 2024-06-25 NOTE — PROGRESS NOTES
(PEPCID) 20 MG tablet Take 1 tablet by mouth 2 times daily Yes Joy Domingo APRN - CNP   NONFORMULARY G-150 - Benfotiamine Yes Alysha Hunt MD   blood glucose test strips (FREESTYLE LITE) strip USE 1 STRIP TO CHECK GLUCOSE ONCE DAILY Yes Guillermo Maurice MD   glucose monitoring (FREESTYLE) kit 1 kit by Does not apply route 2 times daily Dx: E11.65 Yes Guillermo Maurice MD   Misc Natural Products (CHROMIUM PICOLINATE FORTIFIED PO) Take 500 mcg by mouth daily  Yes Alysha Hunt MD   FREESTYLE LANCETS MISC 1 each by Does not apply route 2 times daily Dx: E11.65 Yes Guillermo Maurice MD   vitamin B-12 (CYANOCOBALAMIN) 1000 MCG tablet Take 1 tablet by mouth daily Yes Alysha Hunt MD   Calcium-Magnesium-Vitamin D (CALCIUM 500 PO) Take by mouth Yes Alysha Hunt MD   vitamin D-3 (CHOLECALCIFEROL) 5000 UNITS TABS Take 1 tablet by mouth daily Yes Alysha Hunt MD       CareTeam (Including outside providers/suppliers regularly involved in providing care):   Patient Care Team:  Guillermo Maurice MD as PCP - General (Family Medicine)  Guillermo Maurcie MD as PCP - Empaneled Provider  Diane Acuna MD as Consulting Physician (Otolaryngology)     Reviewed and updated this visit:  Tobacco  Allergies  Meds  Problems  Med Hx  Surg Hx  Soc Hx  Fam Hx

## 2024-06-26 ENCOUNTER — TELEPHONE (OUTPATIENT)
Dept: FAMILY MEDICINE CLINIC | Age: 74
End: 2024-06-26

## 2024-06-26 LAB
ALBUMIN SERPL-MCNC: 4.5 G/DL (ref 3.4–5)
ALBUMIN/GLOB SERPL: 1.6 {RATIO} (ref 1.1–2.2)
ALP SERPL-CCNC: 61 U/L (ref 40–129)
ALT SERPL-CCNC: <5 U/L (ref 10–40)
ANION GAP SERPL CALCULATED.3IONS-SCNC: 11 MMOL/L (ref 3–16)
AST SERPL-CCNC: 15 U/L (ref 15–37)
BILIRUB SERPL-MCNC: <0.2 MG/DL (ref 0–1)
BUN SERPL-MCNC: 13 MG/DL (ref 7–20)
CALCIUM SERPL-MCNC: 9.7 MG/DL (ref 8.3–10.6)
CHLORIDE SERPL-SCNC: 104 MMOL/L (ref 99–110)
CHOLEST SERPL-MCNC: 183 MG/DL (ref 0–199)
CO2 SERPL-SCNC: 27 MMOL/L (ref 21–32)
CREAT SERPL-MCNC: 1 MG/DL (ref 0.6–1.2)
EST. AVERAGE GLUCOSE BLD GHB EST-MCNC: 131.2 MG/DL
GFR SERPLBLD CREATININE-BSD FMLA CKD-EPI: 59 ML/MIN/{1.73_M2}
GLUCOSE SERPL-MCNC: 84 MG/DL (ref 70–99)
HBA1C MFR BLD: 6.2 %
HDLC SERPL-MCNC: 63 MG/DL (ref 40–60)
LDLC SERPL CALC-MCNC: 107 MG/DL
POTASSIUM SERPL-SCNC: 4.5 MMOL/L (ref 3.5–5.1)
PROT SERPL-MCNC: 7.4 G/DL (ref 6.4–8.2)
SODIUM SERPL-SCNC: 142 MMOL/L (ref 136–145)
TRIGL SERPL-MCNC: 64 MG/DL (ref 0–150)
VLDLC SERPL CALC-MCNC: 13 MG/DL

## 2024-06-26 NOTE — TELEPHONE ENCOUNTER
Central scheduling called stating pt has an order put in for a Low Dose Chest CT-Abnormal Lung Screen Follow up     She stated it should only say Lung screen on the order. She also stated to contact the pt when the order has been put in.

## 2024-07-05 ENCOUNTER — HOSPITAL ENCOUNTER (OUTPATIENT)
Dept: VASCULAR LAB | Age: 74
End: 2024-07-05
Attending: FAMILY MEDICINE
Payer: MEDICARE

## 2024-07-05 ENCOUNTER — HOSPITAL ENCOUNTER (OUTPATIENT)
Dept: CT IMAGING | Age: 74
Discharge: HOME | End: 2024-07-05
Attending: FAMILY MEDICINE
Payer: MEDICARE

## 2024-07-05 DIAGNOSIS — F17.200 SMOKER: ICD-10-CM

## 2024-07-05 DIAGNOSIS — I65.23 BILATERAL CAROTID ARTERY STENOSIS: ICD-10-CM

## 2024-07-05 LAB
VAS LEFT ARM BP: 176 MMHG
VAS LEFT CCA DIST EDV: 19.9 CM/S
VAS LEFT CCA DIST PSV: 63.4 CM/S
VAS LEFT CCA MID EDV: 23 CM/S
VAS LEFT CCA MID PSV: 72.7 CM/S
VAS LEFT CCA PROX EDV: 25.5 CM/S
VAS LEFT CCA PROX PSV: 75.8 CM/S
VAS LEFT ECA EDV: 7.71 CM/S
VAS LEFT ECA PSV: 50.1 CM/S
VAS LEFT ICA DIST EDV: 29.5 CM/S
VAS LEFT ICA DIST PSV: 82.5 CM/S
VAS LEFT ICA MID EDV: 19.3 CM/S
VAS LEFT ICA MID PSV: 56 CM/S
VAS LEFT ICA PROX EDV: 10.9 CM/S
VAS LEFT ICA PROX PSV: 33.3 CM/S
VAS LEFT ICA/CCA PSV: 0.46
VAS LEFT SUBCLAVIAN PROX EDV: 0 CM/S
VAS LEFT SUBCLAVIAN PROX PSV: 94.6 CM/S
VAS LEFT VERTEBRAL EDV: 15.8 CM/S
VAS LEFT VERTEBRAL PSV: 56.4 CM/S
VAS RIGHT ARM BP: 162 MMHG
VAS RIGHT CCA DIST EDV: 21.4 CM/S
VAS RIGHT CCA DIST PSV: 65.3 CM/S
VAS RIGHT CCA MID EDV: 25.8 CM/S
VAS RIGHT CCA MID PSV: 72.4 CM/S
VAS RIGHT CCA PROX EDV: 17.1 CM/S
VAS RIGHT CCA PROX PSV: 62.3 CM/S
VAS RIGHT ECA EDV: 11 CM/S
VAS RIGHT ECA PSV: 60.9 CM/S
VAS RIGHT ICA DIST EDV: 26.7 CM/S
VAS RIGHT ICA DIST PSV: 78.8 CM/S
VAS RIGHT ICA MID EDV: 15.4 CM/S
VAS RIGHT ICA MID PSV: 51.6 CM/S
VAS RIGHT ICA PROX EDV: 12.6 CM/S
VAS RIGHT ICA PROX PSV: 45.5 CM/S
VAS RIGHT ICA/CCA PSV: 0.63
VAS RIGHT SUBCLAVIAN PROX EDV: 0 CM/S
VAS RIGHT SUBCLAVIAN PROX PSV: 114 CM/S
VAS RIGHT VERTEBRAL EDV: 11.2 CM/S
VAS RIGHT VERTEBRAL PSV: 55.3 CM/S

## 2024-07-05 PROCEDURE — 93880 EXTRACRANIAL BILAT STUDY: CPT

## 2024-07-05 PROCEDURE — 71250 CT THORAX DX C-: CPT

## 2024-07-05 PROCEDURE — 93880 EXTRACRANIAL BILAT STUDY: CPT | Performed by: SURGERY

## 2024-09-23 ENCOUNTER — OFFICE VISIT (OUTPATIENT)
Dept: FAMILY MEDICINE CLINIC | Age: 74
End: 2024-09-23
Payer: MEDICARE

## 2024-09-23 VITALS
OXYGEN SATURATION: 99 % | BODY MASS INDEX: 30.49 KG/M2 | SYSTOLIC BLOOD PRESSURE: 118 MMHG | TEMPERATURE: 96.9 F | HEIGHT: 65 IN | DIASTOLIC BLOOD PRESSURE: 60 MMHG | HEART RATE: 88 BPM | WEIGHT: 183 LBS

## 2024-09-23 DIAGNOSIS — R03.0 ELEVATED BLOOD PRESSURE READING: Primary | ICD-10-CM

## 2024-09-23 DIAGNOSIS — R42 DIZZINESS: ICD-10-CM

## 2024-09-23 DIAGNOSIS — E11.65 UNCONTROLLED TYPE 2 DIABETES MELLITUS WITH HYPERGLYCEMIA (HCC): ICD-10-CM

## 2024-09-23 LAB — HBA1C MFR BLD: 6.5 %

## 2024-09-23 PROCEDURE — 3044F HG A1C LEVEL LT 7.0%: CPT | Performed by: NURSE PRACTITIONER

## 2024-09-23 PROCEDURE — 2022F DILAT RTA XM EVC RTNOPTHY: CPT | Performed by: NURSE PRACTITIONER

## 2024-09-23 PROCEDURE — 1123F ACP DISCUSS/DSCN MKR DOCD: CPT | Performed by: NURSE PRACTITIONER

## 2024-09-23 PROCEDURE — 1090F PRES/ABSN URINE INCON ASSESS: CPT | Performed by: NURSE PRACTITIONER

## 2024-09-23 PROCEDURE — 3017F COLORECTAL CA SCREEN DOC REV: CPT | Performed by: NURSE PRACTITIONER

## 2024-09-23 PROCEDURE — 83036 HEMOGLOBIN GLYCOSYLATED A1C: CPT | Performed by: NURSE PRACTITIONER

## 2024-09-23 PROCEDURE — 1036F TOBACCO NON-USER: CPT | Performed by: NURSE PRACTITIONER

## 2024-09-23 PROCEDURE — G8399 PT W/DXA RESULTS DOCUMENT: HCPCS | Performed by: NURSE PRACTITIONER

## 2024-09-23 PROCEDURE — G8427 DOCREV CUR MEDS BY ELIG CLIN: HCPCS | Performed by: NURSE PRACTITIONER

## 2024-09-23 PROCEDURE — G8417 CALC BMI ABV UP PARAM F/U: HCPCS | Performed by: NURSE PRACTITIONER

## 2024-09-23 PROCEDURE — 99214 OFFICE O/P EST MOD 30 MIN: CPT | Performed by: NURSE PRACTITIONER

## 2024-10-03 PROCEDURE — 99285 EMERGENCY DEPT VISIT HI MDM: CPT

## 2024-10-04 ENCOUNTER — APPOINTMENT (OUTPATIENT)
Dept: GENERAL RADIOLOGY | Age: 74
End: 2024-10-04
Payer: MEDICARE

## 2024-10-04 ENCOUNTER — APPOINTMENT (OUTPATIENT)
Dept: CT IMAGING | Age: 74
End: 2024-10-04
Payer: MEDICARE

## 2024-10-04 ENCOUNTER — HOSPITAL ENCOUNTER (OUTPATIENT)
Age: 74
Setting detail: OBSERVATION
Discharge: HOME | End: 2024-10-04
Attending: EMERGENCY MEDICINE
Payer: MEDICARE

## 2024-10-04 ENCOUNTER — APPOINTMENT (OUTPATIENT)
Dept: NUCLEAR MEDICINE | Age: 74
End: 2024-10-04
Attending: EMERGENCY MEDICINE
Payer: MEDICARE

## 2024-10-04 ENCOUNTER — HOSPITAL ENCOUNTER (OUTPATIENT)
Age: 74
Setting detail: OBSERVATION
Discharge: HOME | End: 2024-10-06
Attending: EMERGENCY MEDICINE
Payer: MEDICARE

## 2024-10-04 VITALS
RESPIRATION RATE: 11 BRPM | TEMPERATURE: 97.6 F | DIASTOLIC BLOOD PRESSURE: 67 MMHG | OXYGEN SATURATION: 97 % | WEIGHT: 187.5 LBS | HEART RATE: 54 BPM | BODY MASS INDEX: 31.24 KG/M2 | HEIGHT: 65 IN | SYSTOLIC BLOOD PRESSURE: 120 MMHG

## 2024-10-04 DIAGNOSIS — R07.9 CHEST PAIN, UNSPECIFIED TYPE: Primary | ICD-10-CM

## 2024-10-04 DIAGNOSIS — R42 LIGHTHEADEDNESS: ICD-10-CM

## 2024-10-04 LAB
ALBUMIN SERPL-MCNC: 4.3 G/DL (ref 3.4–5)
ALP SERPL-CCNC: 59 U/L (ref 40–129)
ALT SERPL-CCNC: <5 U/L (ref 10–40)
ANION GAP SERPL CALCULATED.3IONS-SCNC: 8 MMOL/L (ref 3–16)
AST SERPL-CCNC: 19 U/L (ref 15–37)
BASOPHILS # BLD: 0 K/UL (ref 0–0.2)
BASOPHILS NFR BLD: 0.7 %
BILIRUB DIRECT SERPL-MCNC: 0.2 MG/DL (ref 0–0.3)
BILIRUB INDIRECT SERPL-MCNC: ABNORMAL MG/DL (ref 0–1)
BILIRUB SERPL-MCNC: <0.2 MG/DL (ref 0–1)
BUN SERPL-MCNC: 16 MG/DL (ref 7–20)
CALCIUM SERPL-MCNC: 10 MG/DL (ref 8.3–10.6)
CHLORIDE SERPL-SCNC: 101 MMOL/L (ref 99–110)
CO2 SERPL-SCNC: 29 MMOL/L (ref 21–32)
CREAT SERPL-MCNC: 1.2 MG/DL (ref 0.6–1.2)
DEPRECATED RDW RBC AUTO: 15.5 % (ref 12.4–15.4)
ECHO BSA: 1.97 M2
EKG ATRIAL RATE: 62 BPM
EKG DIAGNOSIS: NORMAL
EKG P AXIS: 66 DEGREES
EKG P-R INTERVAL: 150 MS
EKG Q-T INTERVAL: 422 MS
EKG QRS DURATION: 80 MS
EKG QTC CALCULATION (BAZETT): 428 MS
EKG R AXIS: 28 DEGREES
EKG T AXIS: 52 DEGREES
EKG VENTRICULAR RATE: 62 BPM
EOSINOPHIL # BLD: 0.1 K/UL (ref 0–0.6)
EOSINOPHIL NFR BLD: 1.8 %
GFR SERPLBLD CREATININE-BSD FMLA CKD-EPI: 47 ML/MIN/{1.73_M2}
GLUCOSE SERPL-MCNC: 118 MG/DL (ref 70–99)
HCT VFR BLD AUTO: 41.2 % (ref 36–48)
HGB BLD-MCNC: 13.2 G/DL (ref 12–16)
LIPASE SERPL-CCNC: 10 U/L (ref 13–60)
LYMPHOCYTES # BLD: 1.3 K/UL (ref 1–5.1)
LYMPHOCYTES NFR BLD: 29.7 %
MAGNESIUM SERPL-MCNC: 2.1 MG/DL (ref 1.8–2.4)
MCH RBC QN AUTO: 27.9 PG (ref 26–34)
MCHC RBC AUTO-ENTMCNC: 32 G/DL (ref 31–36)
MCV RBC AUTO: 87.1 FL (ref 80–100)
MONOCYTES # BLD: 0.3 K/UL (ref 0–1.3)
MONOCYTES NFR BLD: 6.8 %
NEUTROPHILS # BLD: 2.7 K/UL (ref 1.7–7.7)
NEUTROPHILS NFR BLD: 61 %
NUC STRESS EJECTION FRACTION: 61 %
NUC STRESS LV EDV: 85 ML (ref 56–104)
NUC STRESS LV ESV: 33 ML (ref 19–49)
NUC STRESS LV MASS: 113 G
NUC STRESS LV STROKE VOLUME: 52 ML
PHOSPHATE SERPL-MCNC: 3.5 MG/DL (ref 2.5–4.9)
PLATELET # BLD AUTO: 180 K/UL (ref 135–450)
PMV BLD AUTO: 9.4 FL (ref 5–10.5)
POTASSIUM SERPL-SCNC: 3.9 MMOL/L (ref 3.5–5.1)
PROT SERPL-MCNC: 7.9 G/DL (ref 6.4–8.2)
RBC # BLD AUTO: 4.73 M/UL (ref 4–5.2)
SODIUM SERPL-SCNC: 138 MMOL/L (ref 136–145)
STRESS BASELINE DIAS BP: 69 MMHG
STRESS BASELINE HR: 60 BPM
STRESS BASELINE SYS BP: 142 MMHG
STRESS ESTIMATED WORKLOAD: 1 METS
STRESS EXERCISE DUR MIN: 1 MIN
STRESS PEAK DIAS BP: 69 MMHG
STRESS PEAK SYS BP: 142 MMHG
STRESS PERCENT HR ACHIEVED: 63 %
STRESS POST PEAK HR: 92 BPM
STRESS RATE PRESSURE PRODUCT: NORMAL BPM*MMHG
STRESS TARGET HR: 146 BPM
TROPONIN, HIGH SENSITIVITY: 11 NG/L (ref 0–14)
TROPONIN, HIGH SENSITIVITY: 9 NG/L (ref 0–14)
WBC # BLD AUTO: 4.4 K/UL (ref 4–11)

## 2024-10-04 PROCEDURE — 78452 HT MUSCLE IMAGE SPECT MULT: CPT

## 2024-10-04 PROCEDURE — 84484 ASSAY OF TROPONIN QUANT: CPT

## 2024-10-04 PROCEDURE — 3430000000 HC RX DIAGNOSTIC RADIOPHARMACEUTICAL: Performed by: EMERGENCY MEDICINE

## 2024-10-04 PROCEDURE — 6360000004 HC RX CONTRAST MEDICATION: Performed by: EMERGENCY MEDICINE

## 2024-10-04 PROCEDURE — 80076 HEPATIC FUNCTION PANEL: CPT

## 2024-10-04 PROCEDURE — 84100 ASSAY OF PHOSPHORUS: CPT

## 2024-10-04 PROCEDURE — G0378 HOSPITAL OBSERVATION PER HR: HCPCS

## 2024-10-04 PROCEDURE — 83735 ASSAY OF MAGNESIUM: CPT

## 2024-10-04 PROCEDURE — 93017 CV STRESS TEST TRACING ONLY: CPT

## 2024-10-04 PROCEDURE — 6360000002 HC RX W HCPCS: Performed by: EMERGENCY MEDICINE

## 2024-10-04 PROCEDURE — 96374 THER/PROPH/DIAG INJ IV PUSH: CPT

## 2024-10-04 PROCEDURE — 70450 CT HEAD/BRAIN W/O DYE: CPT

## 2024-10-04 PROCEDURE — 80048 BASIC METABOLIC PNL TOTAL CA: CPT

## 2024-10-04 PROCEDURE — 71045 X-RAY EXAM CHEST 1 VIEW: CPT

## 2024-10-04 PROCEDURE — 83690 ASSAY OF LIPASE: CPT

## 2024-10-04 PROCEDURE — 78452 HT MUSCLE IMAGE SPECT MULT: CPT | Performed by: INTERNAL MEDICINE

## 2024-10-04 PROCEDURE — 93018 CV STRESS TEST I&R ONLY: CPT | Performed by: INTERNAL MEDICINE

## 2024-10-04 PROCEDURE — A9500 TC99M SESTAMIBI: HCPCS | Performed by: EMERGENCY MEDICINE

## 2024-10-04 PROCEDURE — 93005 ELECTROCARDIOGRAM TRACING: CPT

## 2024-10-04 PROCEDURE — 93016 CV STRESS TEST SUPVJ ONLY: CPT | Performed by: INTERNAL MEDICINE

## 2024-10-04 PROCEDURE — 70496 CT ANGIOGRAPHY HEAD: CPT

## 2024-10-04 PROCEDURE — 85025 COMPLETE CBC W/AUTO DIFF WBC: CPT

## 2024-10-04 RX ORDER — REGADENOSON 0.08 MG/ML
0.4 INJECTION, SOLUTION INTRAVENOUS
Status: COMPLETED | OUTPATIENT
Start: 2024-10-04 | End: 2024-10-04

## 2024-10-04 RX ORDER — TETRAKIS(2-METHOXYISOBUTYLISOCYANIDE)COPPER(I) TETRAFLUOROBORATE 1 MG/ML
32.3 INJECTION, POWDER, LYOPHILIZED, FOR SOLUTION INTRAVENOUS
Status: COMPLETED | OUTPATIENT
Start: 2024-10-04 | End: 2024-10-04

## 2024-10-04 RX ORDER — ONDANSETRON 2 MG/ML
4 INJECTION INTRAMUSCULAR; INTRAVENOUS ONCE
Status: COMPLETED | OUTPATIENT
Start: 2024-10-04 | End: 2024-10-04

## 2024-10-04 RX ORDER — ASPIRIN 81 MG/1
81 TABLET, CHEWABLE ORAL DAILY
Status: DISCONTINUED | OUTPATIENT
Start: 2024-10-05 | End: 2024-10-04 | Stop reason: HOSPADM

## 2024-10-04 RX ORDER — POTASSIUM CHLORIDE 7.45 MG/ML
10 INJECTION INTRAVENOUS PRN
Status: DISCONTINUED | OUTPATIENT
Start: 2024-10-04 | End: 2024-10-04 | Stop reason: HOSPADM

## 2024-10-04 RX ORDER — IOPAMIDOL 755 MG/ML
75 INJECTION, SOLUTION INTRAVASCULAR
Status: COMPLETED | OUTPATIENT
Start: 2024-10-04 | End: 2024-10-04

## 2024-10-04 RX ORDER — SODIUM CHLORIDE 0.9 % (FLUSH) 0.9 %
5-40 SYRINGE (ML) INJECTION EVERY 12 HOURS SCHEDULED
Status: DISCONTINUED | OUTPATIENT
Start: 2024-10-04 | End: 2024-10-04 | Stop reason: HOSPADM

## 2024-10-04 RX ORDER — FAMOTIDINE 20 MG/1
20 TABLET, FILM COATED ORAL 2 TIMES DAILY
Status: DISCONTINUED | OUTPATIENT
Start: 2024-10-04 | End: 2024-10-04

## 2024-10-04 RX ORDER — FAMOTIDINE 20 MG/1
20 TABLET, FILM COATED ORAL DAILY
Status: DISCONTINUED | OUTPATIENT
Start: 2024-10-05 | End: 2024-10-04 | Stop reason: HOSPADM

## 2024-10-04 RX ORDER — METFORMIN HCL 500 MG
1000 TABLET, EXTENDED RELEASE 24 HR ORAL
Status: DISCONTINUED | OUTPATIENT
Start: 2024-10-04 | End: 2024-10-04

## 2024-10-04 RX ORDER — TETRAKIS(2-METHOXYISOBUTYLISOCYANIDE)COPPER(I) TETRAFLUOROBORATE 1 MG/ML
11.9 INJECTION, POWDER, LYOPHILIZED, FOR SOLUTION INTRAVENOUS
Status: COMPLETED | OUTPATIENT
Start: 2024-10-04 | End: 2024-10-04

## 2024-10-04 RX ORDER — POTASSIUM CHLORIDE 1500 MG/1
40 TABLET, EXTENDED RELEASE ORAL PRN
Status: DISCONTINUED | OUTPATIENT
Start: 2024-10-04 | End: 2024-10-04 | Stop reason: HOSPADM

## 2024-10-04 RX ORDER — SODIUM CHLORIDE 0.9 % (FLUSH) 0.9 %
5-40 SYRINGE (ML) INJECTION PRN
Status: DISCONTINUED | OUTPATIENT
Start: 2024-10-04 | End: 2024-10-04 | Stop reason: HOSPADM

## 2024-10-04 RX ORDER — SODIUM CHLORIDE 9 MG/ML
INJECTION, SOLUTION INTRAVENOUS PRN
Status: DISCONTINUED | OUTPATIENT
Start: 2024-10-04 | End: 2024-10-04 | Stop reason: HOSPADM

## 2024-10-04 RX ORDER — MAGNESIUM SULFATE IN WATER 40 MG/ML
2000 INJECTION, SOLUTION INTRAVENOUS PRN
Status: DISCONTINUED | OUTPATIENT
Start: 2024-10-04 | End: 2024-10-04 | Stop reason: HOSPADM

## 2024-10-04 RX ADMIN — Medication 11.9 MILLICURIE: at 07:15

## 2024-10-04 RX ADMIN — ONDANSETRON 4 MG: 2 INJECTION INTRAMUSCULAR; INTRAVENOUS at 01:50

## 2024-10-04 RX ADMIN — REGADENOSON 0.4 MG: 0.08 INJECTION, SOLUTION INTRAVENOUS at 08:25

## 2024-10-04 RX ADMIN — Medication 32.3 MILLICURIE: at 08:25

## 2024-10-04 RX ADMIN — IOPAMIDOL 75 ML: 755 INJECTION, SOLUTION INTRAVENOUS at 02:51

## 2024-10-04 ASSESSMENT — LIFESTYLE VARIABLES
HOW MANY STANDARD DRINKS CONTAINING ALCOHOL DO YOU HAVE ON A TYPICAL DAY: PATIENT DOES NOT DRINK
HOW OFTEN DO YOU HAVE A DRINK CONTAINING ALCOHOL: NEVER

## 2024-10-04 ASSESSMENT — ENCOUNTER SYMPTOMS
ABDOMINAL PAIN: 1
VOMITING: 0
CONSTIPATION: 0
EYES NEGATIVE: 1
DIARRHEA: 0
NAUSEA: 1
RESPIRATORY NEGATIVE: 1

## 2024-10-04 ASSESSMENT — PAIN - FUNCTIONAL ASSESSMENT
PAIN_FUNCTIONAL_ASSESSMENT: 0-10
PAIN_FUNCTIONAL_ASSESSMENT: NONE - DENIES PAIN
PAIN_FUNCTIONAL_ASSESSMENT: NONE - DENIES PAIN

## 2024-10-04 ASSESSMENT — PAIN DESCRIPTION - LOCATION: LOCATION: ABDOMEN

## 2024-10-04 ASSESSMENT — PAIN SCALES - GENERAL: PAINLEVEL_OUTOF10: 7

## 2024-10-04 NOTE — ED PROVIDER NOTES
[facility]  EMERGENCY DEPARTMENT ENCOUNTER          ATTENDING PHYSICIAN NOTE       Date of evaluation: 10/3/2024    Chief Complaint     Dizziness (Coming in for dizziness started on Monday, also complains of heartburn started today)      History of Present Illness     Marychuy Leonard is a 74 y.o. female who presents to the emergency department complaining of dizziness, nausea, and heartburn.  Patient states over the last 2 weeks she has been having intermittent episodes of dizziness.  States the dizziness is worse when she closes her eyes and also when she moves around.  She describes the dizziness as a sensation of lightheadedness.  She denies any vertiginous symptoms.  She did see her primary care's office for this when it started and they noted her blood pressure was slightly elevated initially but this improved without intervention so no changes were made to her medications.  Patient states that she continued to have intermittent episodes of dizziness since then but tonight decided to come to the emerged from because she started developing sensation of nausea and heartburn.  She denies any vomiting but says she has a sensation of fullness in the upper abdomen and lower chest.  She describes a burning sensation located in center of the chest as well.  She denies any radiation of this pain.  She denies any shortness of breath or cough.  She denies any swelling or pain in her extremities.  She does have a history of reflux and states that the heartburn feels similar to this, though she typically does not have a nausea with this in the past.  She did not try taking anything for her symptoms.    ASSESSMENT / PLAN  (MEDICAL DECISION MAKING)     INITIAL VITALS: BP: (!) 163/78, Temp: 97.8 °F (36.6 °C), Pulse: 68, Respirations: 18, SpO2: 100 %      Marychuy Leonard is a 74 y.o. female with history of diabetes and borderline hypertension presents complaining of several days of lightheadedness with 1 day of  Take by mouth    FAMOTIDINE (PEPCID) 20 MG TABLET    Take 1 tablet by mouth 2 times daily    FREESTYLE LANCETS MISC    1 each by Does not apply route 2 times daily Dx: E11.65    GLUCOSE MONITORING (FREESTYLE) KIT    1 kit by Does not apply route 2 times daily Dx: E11.65    METFORMIN (GLUCOPHAGE-XR) 500 MG EXTENDED RELEASE TABLET    TAKE 2 TABLETS BY MOUTH ONCE DAILY WITH BREAKFAST    MISC NATURAL PRODUCTS (CHROMIUM PICOLINATE FORTIFIED PO)    Take 500 mcg by mouth daily     NONFORMULARY    G-150 - Benfotiamine    NONFORMULARY    Cbd gummies- full spectrum hemp extract  25mg of cbd per cube    ONE TOUCH ULTRASOFT LANCETS MISC    1 each by Does not apply route daily    VITAMIN B-12 (CYANOCOBALAMIN) 1000 MCG TABLET    Take 1 tablet by mouth daily    VITAMIN D-3 (CHOLECALCIFEROL) 5000 UNITS TABS    Take 1 tablet by mouth daily       Allergies     She is allergic to glucosamine chondroitin joint [glucos-chondroit-hyaluron-msm] and relafen [nabumetone].    Physical Exam     INITIAL VITALS: BP: (!) 163/78, Temp: 97.8 °F (36.6 °C), Pulse: 68, Respirations: 18, SpO2: 100 %   Physical Exam  Vitals and nursing note reviewed.   Constitutional:       General: She is not in acute distress.  HENT:      Head: Normocephalic and atraumatic.      Mouth/Throat:      Mouth: Mucous membranes are moist.      Pharynx: No oropharyngeal exudate.   Eyes:      General: No scleral icterus.     Extraocular Movements: Extraocular movements intact.      Conjunctiva/sclera: Conjunctivae normal.      Pupils: Pupils are equal, round, and reactive to light.   Cardiovascular:      Rate and Rhythm: Normal rate and regular rhythm.      Heart sounds: Normal heart sounds.   Pulmonary:      Effort: Pulmonary effort is normal.      Breath sounds: Normal breath sounds. No wheezing, rhonchi or rales.   Abdominal:      General: Bowel sounds are normal.      Palpations: Abdomen is soft.      Tenderness: There is no abdominal tenderness. There is no guarding or

## 2024-10-04 NOTE — H&P
Take 500 mcg by mouth daily     NONFORMULARY    G-150 - Benfotiamine    NONFORMULARY    Cbd gummies- full spectrum hemp extract  25mg of cbd per cube    ONE TOUCH ULTRASOFT LANCETS MISC    1 each by Does not apply route daily    VITAMIN B-12 (CYANOCOBALAMIN) 1000 MCG TABLET    Take 1 tablet by mouth daily    VITAMIN D-3 (CHOLECALCIFEROL) 5000 UNITS TABS    Take 1 tablet by mouth daily          Review of Systems      Review of Systems   Constitutional: Negative.    HENT: Negative.     Eyes: Negative.    Respiratory: Negative.     Cardiovascular:  Positive for chest pain.   Gastrointestinal:  Positive for abdominal pain and nausea. Negative for constipation, diarrhea and vomiting.   Genitourinary: Negative.    Musculoskeletal: Negative.    Neurological:  Positive for light-headedness. Negative for weakness, numbness and headaches.   All other systems reviewed and are negative.       Physical Examination      Physical Exam  Vitals and nursing note reviewed.   Constitutional:       General: She is not in acute distress.  HENT:      Head: Normocephalic and atraumatic.      Mouth/Throat:      Mouth: Mucous membranes are moist.      Pharynx: No oropharyngeal exudate.   Eyes:      General: No scleral icterus.     Extraocular Movements: Extraocular movements intact.      Conjunctiva/sclera: Conjunctivae normal.      Pupils: Pupils are equal, round, and reactive to light.   Cardiovascular:      Rate and Rhythm: Normal rate and regular rhythm.      Heart sounds: Normal heart sounds.   Pulmonary:      Effort: Pulmonary effort is normal.      Breath sounds: Normal breath sounds. No wheezing, rhonchi or rales.   Abdominal:      General: Bowel sounds are normal.      Palpations: Abdomen is soft.      Tenderness: There is no abdominal tenderness. There is no guarding or rebound.   Musculoskeletal:         General: No swelling. Normal range of motion.      Cervical back: Normal range of motion and neck supple.   Skin:     General:

## 2024-10-04 NOTE — ED NOTES
[facility] - Clinical Pharmacy Note - Renal Dosing    Pepcid ordered for treatment of GERD. Per [facility] Renal Dose Adjustment Policy, famotidine will be changed to 20 mg PO daily.     Estimated Creatinine Clearance: Estimated Creatinine Clearance: 44 mL/min (based on SCr of 1.2 mg/dL).  Dialysis Status, DANIEL, CKD: DANIEL  BMI: Body mass index is 31.2 kg/m².    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    Pharmacy will continue to monitor renal function and adjust dose as necessary.      Please call with any questions.    Angie Jett, PharmD, [facility]  Clinical Pharmacy Specialist - Emergency Dept  Wireless: [phone]  10/4/2024 11:11 AM

## 2024-10-04 NOTE — ED NOTES
[facility] Emergency Department  ED Observation Disposition Note   Emergency Physicians         Diagnostic Evaluation      Diagnostic studies germane to this period of clinical observation include:    Stress testing     Consultant(s) Final Recommendations      - No inducible ischemia noted on stress testing     Impression and Plan      Marychuy Leonard has been cared for according to the standard [facility] observation protocol for chest pain. This extended period of observation was specifically required to determine the need for hospitalization. Prior to discharge from observation, the final physical exam is documented below.      Significant events during the course of observation based on the goals of the clinical problem list include:    - Negative stress test  - Negative CTA head/neck  - Negative CT head    Based on the patient's condition, clinical response, and diagnostic information obtained during this period of observation, the plan is to Discharge to home    The total length of observation was 14 hours. Dr. West is the observation disposition attending.    The patient will follow-up with:    - PCP    As appropriate, please see the AVS for comprehensive discharge instructions.       Subjective      Marychuy Leonard has undergone comprehensive diagnostic evaluation and therapeutic management in accordance with the CDU guidelines for Chest pain.  At the time of disposition, the patient was asymptomatic.     Physical Examination      Physical Exam  HENT:      Head: Normocephalic.      Nose: Nose normal.      Mouth/Throat:      Mouth: Mucous membranes are moist.   Eyes:      Pupils: Pupils are equal, round, and reactive to light.   Cardiovascular:      Rate and Rhythm: Normal rate and regular rhythm.      Heart sounds: No murmur heard.  Pulmonary:      Effort: Pulmonary effort is normal.      Breath sounds: Normal breath sounds.   Abdominal:      General: Abdomen is flat.

## 2024-10-04 NOTE — DISCHARGE INSTRUCTIONS
We saw you in the hospital for lightheadedness. You received a stress test which was normal. You received CT scans of your head which were also normal.    You need to see your regular doctor in 2-3 days to be checked. Please contact your regular doctor and schedule an appointment.    You should return to the emergency department if your symptoms worsen or do not resolve. In addition, return if:  - You have a fever (greater than 101 degrees)  - You have chest pain, shortness of breath, abdominal pain, or uncontrollable vomiting  - You are unable to eat or drink  - You pass out  - You have difficulty moving your arms or legs   - You have difficulty speaking or slurred speech  - Or you have any concern that you feel needs immediate physician evaluation.

## 2024-10-07 ENCOUNTER — TELEPHONE (OUTPATIENT)
Age: 74
End: 2024-10-07

## 2024-10-07 NOTE — TELEPHONE ENCOUNTER
Care Transitions Initial Follow Up Call    Outreach made within 2 business days of discharge: Yes    Patient: Marychuy Leonard Patient : 1950   MRN: [id number]  Reason for Admission: chest pain   Discharge Date: 10/4/24       Spoke with: Left message on recorder for patient to call back at  [phone]      Discharge department/facility:     [facility] Interactive Patient Contact:  Was patient able to fill all prescriptions:   Was patient instructed to bring all medications to the follow-up visit:   Is patient taking all medications as directed in the discharge summary?   Does patient understand their discharge instructions:   Does patient have questions or concerns that need addressed prior to 7-14 day follow up office visit:     Additional needs identified to be addressed with provider               Scheduled appointment with PCP within 7-14 days    Follow Up  Future Appointments   Date Time Provider Department Center   10/8/2024  8:00 AM Michael Gaston MD [facility] [address] [facility]       Viv Cardenas MA     Not applicable

## 2024-10-08 ENCOUNTER — OFFICE VISIT (OUTPATIENT)
Dept: FAMILY MEDICINE CLINIC | Age: 74
End: 2024-10-08
Payer: MEDICARE

## 2024-10-08 VITALS
BODY MASS INDEX: 30.02 KG/M2 | HEIGHT: 65 IN | SYSTOLIC BLOOD PRESSURE: 124 MMHG | OXYGEN SATURATION: 98 % | DIASTOLIC BLOOD PRESSURE: 78 MMHG | HEART RATE: 70 BPM | WEIGHT: 180.2 LBS

## 2024-10-08 DIAGNOSIS — R07.9 CHEST PAIN, UNSPECIFIED TYPE: Primary | ICD-10-CM

## 2024-10-08 DIAGNOSIS — R10.13 DYSPEPSIA: ICD-10-CM

## 2024-10-08 DIAGNOSIS — E11.22 TYPE 2 DIABETES MELLITUS WITH CHRONIC KIDNEY DISEASE, WITHOUT LONG-TERM CURRENT USE OF INSULIN, UNSPECIFIED CKD STAGE (HCC): ICD-10-CM

## 2024-10-08 PROCEDURE — 1123F ACP DISCUSS/DSCN MKR DOCD: CPT | Performed by: STUDENT IN AN ORGANIZED HEALTH CARE EDUCATION/TRAINING PROGRAM

## 2024-10-08 PROCEDURE — G8427 DOCREV CUR MEDS BY ELIG CLIN: HCPCS | Performed by: STUDENT IN AN ORGANIZED HEALTH CARE EDUCATION/TRAINING PROGRAM

## 2024-10-08 PROCEDURE — 1090F PRES/ABSN URINE INCON ASSESS: CPT | Performed by: STUDENT IN AN ORGANIZED HEALTH CARE EDUCATION/TRAINING PROGRAM

## 2024-10-08 PROCEDURE — 99213 OFFICE O/P EST LOW 20 MIN: CPT | Performed by: STUDENT IN AN ORGANIZED HEALTH CARE EDUCATION/TRAINING PROGRAM

## 2024-10-08 PROCEDURE — G8399 PT W/DXA RESULTS DOCUMENT: HCPCS | Performed by: STUDENT IN AN ORGANIZED HEALTH CARE EDUCATION/TRAINING PROGRAM

## 2024-10-08 PROCEDURE — 3017F COLORECTAL CA SCREEN DOC REV: CPT | Performed by: STUDENT IN AN ORGANIZED HEALTH CARE EDUCATION/TRAINING PROGRAM

## 2024-10-08 PROCEDURE — G8417 CALC BMI ABV UP PARAM F/U: HCPCS | Performed by: STUDENT IN AN ORGANIZED HEALTH CARE EDUCATION/TRAINING PROGRAM

## 2024-10-08 PROCEDURE — 3044F HG A1C LEVEL LT 7.0%: CPT | Performed by: STUDENT IN AN ORGANIZED HEALTH CARE EDUCATION/TRAINING PROGRAM

## 2024-10-08 PROCEDURE — G8484 FLU IMMUNIZE NO ADMIN: HCPCS | Performed by: STUDENT IN AN ORGANIZED HEALTH CARE EDUCATION/TRAINING PROGRAM

## 2024-10-08 PROCEDURE — 1036F TOBACCO NON-USER: CPT | Performed by: STUDENT IN AN ORGANIZED HEALTH CARE EDUCATION/TRAINING PROGRAM

## 2024-10-08 PROCEDURE — 2022F DILAT RTA XM EVC RTNOPTHY: CPT | Performed by: STUDENT IN AN ORGANIZED HEALTH CARE EDUCATION/TRAINING PROGRAM

## 2024-10-08 NOTE — PROGRESS NOTES
Chief Complaint   Patient presents with    Follow-up     ED FOLLOW UP//CHEST PAIN           HPI: Marychuy Leonard is a 74 y.o. female who presents for ED FUV    #Chest Pain  -Seen in the emergency room 4 days ago for chest pain, from ER note:    Marychuy Leonard is a 74 y.o. female who presented to the Emergency Department for evaluation of chest pain, lightheadedness, and nausea.  The acute evaluation included unremarkable laboratory studies including negative serial troponins.  CT scan of the head and CT angiogram of head and neck showed no acute abnormalities.       Upon admission to the Observation Unit, Marychuy Leonard is stable with resolution of her symptoms after being ministered Zofran.    - Marychuy Leonard has been cared for according to the standard [facility] observation protocol for chest pain. This extended period of observation was specifically required to determine the need for hospitalization. Prior to discharge from observation, the final physical exam is documented below.       Significant events during the course of observation based on the goals of the clinical problem list include:     - Negative stress test  - Negative CTA head/neck  - Negative CT head     Based on the patient's condition, clinical response, and diagnostic information obtained during this period of observation, the plan is to Discharge to home     The total length of observation was 14 hours. Dr. West is the observation disposition attending.     The patient will follow-up with:     - PCP   -Is also on famotidine 20 mg twice daily for dyspepsia, states she feeling better, dizzines has improved, denies current chest pain, is not needing famotidine anymore, doing better without it, does have a history of hiatal hernia  -Eating and drinking well, denies nausea or vomiting  - Denies abdominal pain or new SOB,   -Last A1c below, is now only on Metformin 500 mg QD, supposed to be on 1000 mg QD, states

## 2024-11-07 DIAGNOSIS — E11.65 UNCONTROLLED TYPE 2 DIABETES MELLITUS WITH HYPERGLYCEMIA (HCC): ICD-10-CM

## 2024-11-07 RX ORDER — BLOOD-GLUCOSE METER
KIT MISCELLANEOUS
Qty: 100 EACH | Refills: 5 | Status: SHIPPED | OUTPATIENT
Start: 2024-11-07

## 2024-12-02 ENCOUNTER — OFFICE VISIT (OUTPATIENT)
Dept: FAMILY MEDICINE CLINIC | Age: 74
End: 2024-12-02
Payer: MEDICARE

## 2024-12-02 VITALS
OXYGEN SATURATION: 99 % | HEART RATE: 78 BPM | HEIGHT: 65 IN | WEIGHT: 183 LBS | SYSTOLIC BLOOD PRESSURE: 124 MMHG | DIASTOLIC BLOOD PRESSURE: 68 MMHG | BODY MASS INDEX: 30.49 KG/M2

## 2024-12-02 DIAGNOSIS — R03.0 ELEVATED BLOOD PRESSURE READING WITHOUT DIAGNOSIS OF HYPERTENSION: ICD-10-CM

## 2024-12-02 DIAGNOSIS — E11.22 TYPE 2 DIABETES MELLITUS WITH CHRONIC KIDNEY DISEASE, WITHOUT LONG-TERM CURRENT USE OF INSULIN, UNSPECIFIED CKD STAGE (HCC): Primary | ICD-10-CM

## 2024-12-02 DIAGNOSIS — Z87.891 SMOKING HISTORY: ICD-10-CM

## 2024-12-02 PROCEDURE — 99213 OFFICE O/P EST LOW 20 MIN: CPT | Performed by: FAMILY MEDICINE

## 2024-12-02 PROCEDURE — 3017F COLORECTAL CA SCREEN DOC REV: CPT | Performed by: FAMILY MEDICINE

## 2024-12-02 PROCEDURE — G8399 PT W/DXA RESULTS DOCUMENT: HCPCS | Performed by: FAMILY MEDICINE

## 2024-12-02 PROCEDURE — G8417 CALC BMI ABV UP PARAM F/U: HCPCS | Performed by: FAMILY MEDICINE

## 2024-12-02 PROCEDURE — 1159F MED LIST DOCD IN RCRD: CPT | Performed by: FAMILY MEDICINE

## 2024-12-02 PROCEDURE — 1036F TOBACCO NON-USER: CPT | Performed by: FAMILY MEDICINE

## 2024-12-02 PROCEDURE — 3044F HG A1C LEVEL LT 7.0%: CPT | Performed by: FAMILY MEDICINE

## 2024-12-02 PROCEDURE — G8427 DOCREV CUR MEDS BY ELIG CLIN: HCPCS | Performed by: FAMILY MEDICINE

## 2024-12-02 PROCEDURE — 1123F ACP DISCUSS/DSCN MKR DOCD: CPT | Performed by: FAMILY MEDICINE

## 2024-12-02 PROCEDURE — G8484 FLU IMMUNIZE NO ADMIN: HCPCS | Performed by: FAMILY MEDICINE

## 2024-12-02 PROCEDURE — 2022F DILAT RTA XM EVC RTNOPTHY: CPT | Performed by: FAMILY MEDICINE

## 2024-12-02 PROCEDURE — 1090F PRES/ABSN URINE INCON ASSESS: CPT | Performed by: FAMILY MEDICINE

## 2024-12-02 ASSESSMENT — ENCOUNTER SYMPTOMS
EYE DISCHARGE: 0
COUGH: 0
ABDOMINAL PAIN: 0
NAUSEA: 0
BACK PAIN: 0
TROUBLE SWALLOWING: 0
EYE REDNESS: 0
DIARRHEA: 0
ABDOMINAL DISTENTION: 0
FACIAL SWELLING: 0
RECTAL PAIN: 0
STRIDOR: 0
SORE THROAT: 0
CHOKING: 0
EYE ITCHING: 0
RHINORRHEA: 0
WHEEZING: 0
SINUS PRESSURE: 0
BLOOD IN STOOL: 0
SHORTNESS OF BREATH: 0
VOICE CHANGE: 0
VOMITING: 0
ANAL BLEEDING: 0
COLOR CHANGE: 0
CHEST TIGHTNESS: 0
APNEA: 0
CONSTIPATION: 0
PHOTOPHOBIA: 0
EYE PAIN: 0

## 2024-12-02 NOTE — PROGRESS NOTES
Subjective:     Patient ID:Marychuy Leonard is a 74 y.o. female.    HPI  Diabetes Mellitus Type II:  Home blood sugar records: patient does not test.  No significant episodes of hypoglycemia.  No polyuria, polydipsia, visual changes, foot problems, GI upset.  Diabetic diet compliance:  compliant most of the time,  Weight trend: stable.  Current exercise: none.  Eye exam current (within one year): yes.    --neg CTA cranial arteries and normal nuclear cardiac stress this year--was a smoker and normal low dose chest CT this summer  Allergies   Allergen Reactions    Relafen [Nabumetone] Itching and Swelling    Glucosamine Chondroitin Joint [Glucos-Chondroit-Hyaluron-Msm] Itching and Other (See Comments)     Use liquid form- had itching and metallic taste in mouth       Current Outpatient Medications   Medication Sig Dispense Refill    blood glucose test strips (FREESTYLE LITE) strip USE 1 STRIP TO CHECK GLUCOSE ONCE DAILY 100 each 5    NONFORMULARY Cbd gummies- full spectrum hemp extract  25mg of cbd per cube      metFORMIN (GLUCOPHAGE-XR) 500 MG extended release tablet TAKE 2 TABLETS BY MOUTH ONCE DAILY WITH BREAKFAST 180 tablet 3    ONE TOUCH ULTRASOFT LANCETS MISC 1 each by Does not apply route daily 100 each 3    famotidine (PEPCID) 20 MG tablet Take 1 tablet by mouth 2 times daily 60 tablet 0    NONFORMULARY G-150 - Benfotiamine      glucose monitoring (FREESTYLE) kit 1 kit by Does not apply route 2 times daily Dx: E11.65 1 kit 0    Misc Natural Products (CHROMIUM PICOLINATE FORTIFIED PO) Take 500 mcg by mouth daily       FREESTYLE LANCETS MISC 1 each by Does not apply route 2 times daily Dx: E11.65 100 each 3    vitamin B-12 (CYANOCOBALAMIN) 1000 MCG tablet Take 1 tablet by mouth daily      Calcium-Magnesium-Vitamin D (CALCIUM 500 PO) Take by mouth      vitamin D-3 (CHOLECALCIFEROL) 5000 UNITS TABS Take 1 tablet by mouth daily       No current facility-administered medications for this visit.       Past Medical

## 2025-03-04 ENCOUNTER — TELEPHONE (OUTPATIENT)
Dept: FAMILY MEDICINE CLINIC | Age: 75
End: 2025-03-04

## 2025-04-14 ENCOUNTER — OFFICE VISIT (OUTPATIENT)
Dept: FAMILY MEDICINE CLINIC | Age: 75
End: 2025-04-14

## 2025-04-14 VITALS
SYSTOLIC BLOOD PRESSURE: 138 MMHG | WEIGHT: 181 LBS | HEART RATE: 77 BPM | BODY MASS INDEX: 30.16 KG/M2 | HEIGHT: 65 IN | DIASTOLIC BLOOD PRESSURE: 80 MMHG | OXYGEN SATURATION: 96 %

## 2025-04-14 DIAGNOSIS — E11.22 TYPE 2 DIABETES MELLITUS WITH CHRONIC KIDNEY DISEASE, WITHOUT LONG-TERM CURRENT USE OF INSULIN, UNSPECIFIED CKD STAGE (HCC): Primary | ICD-10-CM

## 2025-04-14 DIAGNOSIS — E11.65 UNCONTROLLED TYPE 2 DIABETES MELLITUS WITH HYPERGLYCEMIA (HCC): ICD-10-CM

## 2025-04-14 DIAGNOSIS — E11.22 TYPE 2 DIABETES MELLITUS WITH CHRONIC KIDNEY DISEASE, WITHOUT LONG-TERM CURRENT USE OF INSULIN, UNSPECIFIED CKD STAGE (HCC): ICD-10-CM

## 2025-04-14 LAB
ANION GAP SERPL CALCULATED.3IONS-SCNC: 8 MMOL/L (ref 3–16)
BUN SERPL-MCNC: 11 MG/DL (ref 7–20)
CALCIUM SERPL-MCNC: 9.6 MG/DL (ref 8.3–10.6)
CHLORIDE SERPL-SCNC: 104 MMOL/L (ref 99–110)
CO2 SERPL-SCNC: 28 MMOL/L (ref 21–32)
CREAT SERPL-MCNC: 1 MG/DL (ref 0.6–1.2)
EST. AVERAGE GLUCOSE BLD GHB EST-MCNC: 131.2 MG/DL
GFR SERPLBLD CREATININE-BSD FMLA CKD-EPI: 59 ML/MIN/{1.73_M2}
GLUCOSE SERPL-MCNC: 94 MG/DL (ref 70–99)
HBA1C MFR BLD: 6.2 %
POTASSIUM SERPL-SCNC: 4.8 MMOL/L (ref 3.5–5.1)
SODIUM SERPL-SCNC: 140 MMOL/L (ref 136–145)

## 2025-04-14 SDOH — ECONOMIC STABILITY: FOOD INSECURITY: WITHIN THE PAST 12 MONTHS, YOU WORRIED THAT YOUR FOOD WOULD RUN OUT BEFORE YOU GOT MONEY TO BUY MORE.: NEVER TRUE

## 2025-04-14 SDOH — ECONOMIC STABILITY: FOOD INSECURITY: WITHIN THE PAST 12 MONTHS, THE FOOD YOU BOUGHT JUST DIDN'T LAST AND YOU DIDN'T HAVE MONEY TO GET MORE.: NEVER TRUE

## 2025-04-14 ASSESSMENT — PATIENT HEALTH QUESTIONNAIRE - PHQ9
1. LITTLE INTEREST OR PLEASURE IN DOING THINGS: NOT AT ALL
SUM OF ALL RESPONSES TO PHQ QUESTIONS 1-9: 0
2. FEELING DOWN, DEPRESSED OR HOPELESS: NOT AT ALL
SUM OF ALL RESPONSES TO PHQ QUESTIONS 1-9: 0

## 2025-04-14 NOTE — PROGRESS NOTES
Subjective:     Patient ID:Marychuy Leonard is a 74 y.o. female.    Diabetes  She presents for her follow-up diabetic visit. She has type 2 diabetes mellitus. Her disease course has been stable. There are no hypoglycemic associated symptoms. There are no diabetic associated symptoms. There are no hypoglycemic complications. Symptoms are stable. There are no diabetic complications. There are no known risk factors for coronary artery disease. Current diabetic treatment includes diet (couldn't tolerate metformin). She is compliant with treatment all of the time.       Allergies   Allergen Reactions    Relafen [Nabumetone] Itching and Swelling    Glucosamine Chondroitin Joint [Glucos-Chondroit-Hyaluron-Msm] Itching and Other (See Comments)     Use liquid form- had itching and metallic taste in mouth       Current Outpatient Medications   Medication Sig Dispense Refill    blood glucose test strips (FREESTYLE LITE) strip USE 1 STRIP TO CHECK GLUCOSE ONCE DAILY 100 each 5    NONFORMULARY Cbd gummies- full spectrum hemp extract  25mg of cbd per cube      metFORMIN (GLUCOPHAGE-XR) 500 MG extended release tablet TAKE 2 TABLETS BY MOUTH ONCE DAILY WITH BREAKFAST 180 tablet 3    ONE TOUCH ULTRASOFT LANCETS MISC 1 each by Does not apply route daily 100 each 3    famotidine (PEPCID) 20 MG tablet Take 1 tablet by mouth 2 times daily 60 tablet 0    NONFORMULARY G-150 - Benfotiamine      glucose monitoring (FREESTYLE) kit 1 kit by Does not apply route 2 times daily Dx: E11.65 1 kit 0    Misc Natural Products (CHROMIUM PICOLINATE FORTIFIED PO) Take 500 mcg by mouth daily       FREESTYLE LANCETS MISC 1 each by Does not apply route 2 times daily Dx: E11.65 100 each 3    vitamin B-12 (CYANOCOBALAMIN) 1000 MCG tablet Take 1 tablet by mouth daily      Calcium-Magnesium-Vitamin D (CALCIUM 500 PO) Take by mouth      vitamin D-3 (CHOLECALCIFEROL) 5000 UNITS TABS Take 1 tablet by mouth daily       No current facility-administered medications 
3 years to 8 years (need ONE to TWO doses)...

## 2025-04-15 ENCOUNTER — RESULTS FOLLOW-UP (OUTPATIENT)
Dept: FAMILY MEDICINE CLINIC | Age: 75
End: 2025-04-15

## 2025-06-09 ENCOUNTER — OFFICE VISIT (OUTPATIENT)
Dept: FAMILY MEDICINE CLINIC | Age: 75
End: 2025-06-09
Payer: MEDICARE

## 2025-06-09 VITALS
DIASTOLIC BLOOD PRESSURE: 70 MMHG | WEIGHT: 185 LBS | BODY MASS INDEX: 30.82 KG/M2 | HEART RATE: 76 BPM | SYSTOLIC BLOOD PRESSURE: 110 MMHG | OXYGEN SATURATION: 97 % | HEIGHT: 65 IN

## 2025-06-09 DIAGNOSIS — E11.65 UNCONTROLLED TYPE 2 DIABETES MELLITUS WITH HYPERGLYCEMIA (HCC): ICD-10-CM

## 2025-06-09 DIAGNOSIS — M54.50 LOW BACK PAIN WITHOUT SCIATICA, UNSPECIFIED BACK PAIN LATERALITY, UNSPECIFIED CHRONICITY: Primary | ICD-10-CM

## 2025-06-09 DIAGNOSIS — R10.9 BILATERAL FLANK PAIN: ICD-10-CM

## 2025-06-09 LAB
BILIRUBIN, POC: NEGATIVE
BLOOD URINE, POC: NEGATIVE
CLARITY, POC: CLEAR
COLOR, POC: YELLOW
GLUCOSE URINE, POC: NEGATIVE MG/DL
KETONES, POC: NEGATIVE MG/DL
LEUKOCYTE EST, POC: NEGATIVE
NITRITE, POC: NEGATIVE
PH, POC: 6
PROTEIN, POC: NEGATIVE MG/DL
SPECIFIC GRAVITY, POC: 1.01
UROBILINOGEN, POC: 0.2 MG/DL

## 2025-06-09 PROCEDURE — 81002 URINALYSIS NONAUTO W/O SCOPE: CPT | Performed by: FAMILY MEDICINE

## 2025-06-09 PROCEDURE — G8399 PT W/DXA RESULTS DOCUMENT: HCPCS | Performed by: FAMILY MEDICINE

## 2025-06-09 PROCEDURE — 3044F HG A1C LEVEL LT 7.0%: CPT | Performed by: FAMILY MEDICINE

## 2025-06-09 PROCEDURE — G8427 DOCREV CUR MEDS BY ELIG CLIN: HCPCS | Performed by: FAMILY MEDICINE

## 2025-06-09 PROCEDURE — 1090F PRES/ABSN URINE INCON ASSESS: CPT | Performed by: FAMILY MEDICINE

## 2025-06-09 PROCEDURE — 1123F ACP DISCUSS/DSCN MKR DOCD: CPT | Performed by: FAMILY MEDICINE

## 2025-06-09 PROCEDURE — 1159F MED LIST DOCD IN RCRD: CPT | Performed by: FAMILY MEDICINE

## 2025-06-09 PROCEDURE — 1036F TOBACCO NON-USER: CPT | Performed by: FAMILY MEDICINE

## 2025-06-09 PROCEDURE — G8417 CALC BMI ABV UP PARAM F/U: HCPCS | Performed by: FAMILY MEDICINE

## 2025-06-09 PROCEDURE — 99213 OFFICE O/P EST LOW 20 MIN: CPT | Performed by: FAMILY MEDICINE

## 2025-06-09 PROCEDURE — 3017F COLORECTAL CA SCREEN DOC REV: CPT | Performed by: FAMILY MEDICINE

## 2025-06-09 PROCEDURE — 2022F DILAT RTA XM EVC RTNOPTHY: CPT | Performed by: FAMILY MEDICINE

## 2025-06-09 ASSESSMENT — ENCOUNTER SYMPTOMS
CONSTIPATION: 0
COUGH: 0
NAUSEA: 0
BLOOD IN STOOL: 0
CHOKING: 0
RECTAL PAIN: 0
BACK PAIN: 1
STRIDOR: 0
PHOTOPHOBIA: 0
EYE ITCHING: 0
TROUBLE SWALLOWING: 0
APNEA: 0
CHEST TIGHTNESS: 0
VOICE CHANGE: 0
EYE DISCHARGE: 0
EYE PAIN: 0
ABDOMINAL PAIN: 0
VOMITING: 0
ABDOMINAL DISTENTION: 0
ANAL BLEEDING: 0
SHORTNESS OF BREATH: 0
SINUS PRESSURE: 0
FACIAL SWELLING: 0
DIARRHEA: 0
EYE REDNESS: 0
RHINORRHEA: 0
WHEEZING: 0
SORE THROAT: 0
COLOR CHANGE: 0

## 2025-06-09 ASSESSMENT — PATIENT HEALTH QUESTIONNAIRE - PHQ9
1. LITTLE INTEREST OR PLEASURE IN DOING THINGS: NOT AT ALL
2. FEELING DOWN, DEPRESSED OR HOPELESS: NOT AT ALL
SUM OF ALL RESPONSES TO PHQ QUESTIONS 1-9: 0

## 2025-06-09 NOTE — PROGRESS NOTES
rigidity or tenderness.      Right lower leg: No edema.      Left lower leg: No edema.   Lymphadenopathy:      Cervical: No cervical adenopathy.   Skin:     General: Skin is warm and dry.      Coloration: Skin is not pale.      Findings: No erythema or rash.   Neurological:      Mental Status: She is alert and oriented to person, place, and time.      Cranial Nerves: No cranial nerve deficit.      Motor: No weakness or abnormal muscle tone.      Coordination: Coordination normal.      Gait: Gait normal.      Deep Tendon Reflexes: Reflexes normal.   Psychiatric:         Mood and Affect: Mood normal.         Behavior: Behavior normal.         Thought Content: Thought content normal.         Judgment: Judgment normal.     Urine dipstick shows negative for all components.  Micro exam: negative for WBC's or RBC's.      Assessment and Plan:     Uncontrolled type 2 diabetes mellitus with hyperglycemia (HCC)   Chronic, not at goal (unstable), off meds--recheck    Bilateral flank pain   Chronic, at goal (stable), continue current treatment plan-u/a neg